# Patient Record
Sex: MALE | Race: BLACK OR AFRICAN AMERICAN | NOT HISPANIC OR LATINO | Employment: FULL TIME | URBAN - METROPOLITAN AREA
[De-identification: names, ages, dates, MRNs, and addresses within clinical notes are randomized per-mention and may not be internally consistent; named-entity substitution may affect disease eponyms.]

---

## 2017-03-20 DIAGNOSIS — Z12.11 ENCOUNTER FOR COLONOSCOPY DUE TO HISTORY OF COLONIC POLYP: Primary | ICD-10-CM

## 2017-03-20 DIAGNOSIS — Z86.010 ENCOUNTER FOR COLONOSCOPY DUE TO HISTORY OF COLONIC POLYP: Primary | ICD-10-CM

## 2017-03-20 RX ORDER — POLYETHYLENE GLYCOL 3350, SODIUM SULFATE ANHYDROUS, SODIUM BICARBONATE, SODIUM CHLORIDE, POTASSIUM CHLORIDE 236; 22.74; 6.74; 5.86; 2.97 G/4L; G/4L; G/4L; G/4L; G/4L
4 POWDER, FOR SOLUTION ORAL ONCE
Qty: 4000 ML | Refills: 0 | Status: SHIPPED | OUTPATIENT
Start: 2017-03-20 | End: 2017-03-20

## 2018-02-14 ENCOUNTER — OFFICE VISIT (OUTPATIENT)
Dept: INTERNAL MEDICINE | Facility: CLINIC | Age: 58
End: 2018-02-14
Payer: COMMERCIAL

## 2018-02-14 VITALS
OXYGEN SATURATION: 95 % | HEIGHT: 69 IN | BODY MASS INDEX: 37.58 KG/M2 | WEIGHT: 253.75 LBS | DIASTOLIC BLOOD PRESSURE: 100 MMHG | HEART RATE: 79 BPM | SYSTOLIC BLOOD PRESSURE: 160 MMHG | RESPIRATION RATE: 18 BRPM

## 2018-02-14 DIAGNOSIS — E11.42 TYPE 2 DIABETES MELLITUS WITH DIABETIC POLYNEUROPATHY, UNSPECIFIED LONG TERM INSULIN USE STATUS: Primary | ICD-10-CM

## 2018-02-14 DIAGNOSIS — Z12.5 PROSTATE CANCER SCREENING: ICD-10-CM

## 2018-02-14 DIAGNOSIS — K63.5 POLYP OF COLON, UNSPECIFIED PART OF COLON, UNSPECIFIED TYPE: ICD-10-CM

## 2018-02-14 DIAGNOSIS — R26.9 GAIT DISORDER: ICD-10-CM

## 2018-02-14 DIAGNOSIS — E78.5 HYPERLIPIDEMIA, UNSPECIFIED HYPERLIPIDEMIA TYPE: ICD-10-CM

## 2018-02-14 DIAGNOSIS — Z11.59 NEED FOR HEPATITIS C SCREENING TEST: ICD-10-CM

## 2018-02-14 DIAGNOSIS — M25.519 SHOULDER PAIN, UNSPECIFIED CHRONICITY, UNSPECIFIED LATERALITY: ICD-10-CM

## 2018-02-14 DIAGNOSIS — I10 ESSENTIAL HYPERTENSION: ICD-10-CM

## 2018-02-14 PROCEDURE — 99203 OFFICE O/P NEW LOW 30 MIN: CPT | Mod: 25,S$GLB,, | Performed by: INTERNAL MEDICINE

## 2018-02-14 PROCEDURE — 81003 URINALYSIS AUTO W/O SCOPE: CPT

## 2018-02-14 PROCEDURE — 82570 ASSAY OF URINE CREATININE: CPT

## 2018-02-14 PROCEDURE — 99999 PR PBB SHADOW E&M-EST. PATIENT-LVL IV: CPT | Mod: PBBFAC,,, | Performed by: INTERNAL MEDICINE

## 2018-02-14 PROCEDURE — 90471 IMMUNIZATION ADMIN: CPT | Mod: S$GLB,,, | Performed by: INTERNAL MEDICINE

## 2018-02-14 PROCEDURE — 90686 IIV4 VACC NO PRSV 0.5 ML IM: CPT | Mod: S$GLB,,, | Performed by: INTERNAL MEDICINE

## 2018-02-14 PROCEDURE — 81000 URINALYSIS NONAUTO W/SCOPE: CPT

## 2018-02-14 PROCEDURE — 3008F BODY MASS INDEX DOCD: CPT | Mod: S$GLB,,, | Performed by: INTERNAL MEDICINE

## 2018-02-14 PROCEDURE — 82043 UR ALBUMIN QUANTITATIVE: CPT

## 2018-02-14 RX ORDER — AMITRIPTYLINE HYDROCHLORIDE 25 MG/1
25 TABLET, FILM COATED ORAL NIGHTLY
Qty: 30 TABLET | Refills: 0 | Status: SHIPPED | OUTPATIENT
Start: 2018-02-14 | End: 2018-08-31 | Stop reason: SDUPTHER

## 2018-02-14 RX ORDER — INSULIN GLARGINE 100 [IU]/ML
16 INJECTION, SOLUTION SUBCUTANEOUS NIGHTLY
Qty: 4.8 ML | Refills: 0 | Status: SHIPPED | OUTPATIENT
Start: 2018-02-14 | End: 2018-08-31 | Stop reason: SDUPTHER

## 2018-02-14 RX ORDER — METFORMIN HYDROCHLORIDE 500 MG/1
TABLET ORAL
COMMUNITY
Start: 2017-12-08 | End: 2018-02-14 | Stop reason: SDUPTHER

## 2018-02-14 RX ORDER — PEN NEEDLE, DIABETIC 30 GX3/16"
NEEDLE, DISPOSABLE MISCELLANEOUS
COMMUNITY
Start: 2017-11-14 | End: 2020-05-27

## 2018-02-14 RX ORDER — FLUTICASONE PROPIONATE 50 MCG
SPRAY, SUSPENSION (ML) NASAL
COMMUNITY
Start: 2017-12-16 | End: 2018-02-14 | Stop reason: ALTCHOICE

## 2018-02-14 RX ORDER — INSULIN GLARGINE 100 [IU]/ML
16 INJECTION, SOLUTION SUBCUTANEOUS NIGHTLY
COMMUNITY
End: 2018-02-14 | Stop reason: SDUPTHER

## 2018-02-14 RX ORDER — LISINOPRIL 40 MG/1
40 TABLET ORAL DAILY
Qty: 30 TABLET | Refills: 0 | Status: SHIPPED | OUTPATIENT
Start: 2018-02-14 | End: 2018-08-31 | Stop reason: SDUPTHER

## 2018-02-14 RX ORDER — AMLODIPINE BESYLATE 10 MG/1
TABLET ORAL
COMMUNITY
Start: 2018-02-05 | End: 2018-08-31 | Stop reason: ALTCHOICE

## 2018-02-14 RX ORDER — METFORMIN HYDROCHLORIDE 500 MG/1
500 TABLET ORAL 2 TIMES DAILY WITH MEALS
Qty: 60 TABLET | Refills: 0 | Status: SHIPPED | OUTPATIENT
Start: 2018-02-14 | End: 2018-08-31 | Stop reason: SDUPTHER

## 2018-02-14 RX ORDER — METOPROLOL TARTRATE 100 MG/1
100 TABLET ORAL 2 TIMES DAILY
Qty: 60 TABLET | Refills: 0 | Status: SHIPPED | OUTPATIENT
Start: 2018-02-14 | End: 2018-08-31 | Stop reason: SDUPTHER

## 2018-02-14 NOTE — PROGRESS NOTES
"Patient was given vaccine information sheet for the Flu Vaccine. The area of injection was palpated using the acromion process as a landmark. This area was cleaned with alcohol. Using a 25g 1" safety needle, 0.5mL of the vaccine was placed into the Left Deltoid muscle. The injection site was dressed with a bandage. Patient experienced no complications and was discharged in stable condition. Fluzone vaccine Lot: [id number] Exp: 6/30/18    "
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
## 2018-02-14 NOTE — PATIENT INSTRUCTIONS
Your exam was stable today.  Your blood pressure was high today as a result of being off of your medications.  I refilled then today and sent to your pharmacy.  Please take them when you get home.  Will order routine fasting labs - get them done at least 1 week before your next appointment.  I will refer you to a Dietician.  For shoulder pain, keep your follow up as scheduled by [facility].  Will give you the Flu vaccine today.    Follow up in 3 months.

## 2018-02-15 LAB
BACTERIA #/AREA URNS HPF: NORMAL /HPF
BILIRUB UR QL STRIP: NEGATIVE
CAOX CRY URNS QL MICRO: NORMAL
CLARITY UR: CLEAR
COLOR UR: YELLOW
CREAT UR-MCNC: 205.5 MG/DL
GLUCOSE UR QL STRIP: NEGATIVE
HGB UR QL STRIP: NEGATIVE
HYALINE CASTS #/AREA URNS LPF: 1 /LPF
KETONES UR QL STRIP: NEGATIVE
LEUKOCYTE ESTERASE UR QL STRIP: NEGATIVE
MICROALBUMIN UR DL<=1MG/L-MCNC: 821 UG/ML
MICROALBUMIN/CREATININE RATIO: 399.5 UG/MG
MICROSCOPIC COMMENT: NORMAL
NITRITE UR QL STRIP: NEGATIVE
PH UR STRIP: 6 [PH] (ref 5–8)
PROT UR QL STRIP: ABNORMAL
RBC #/AREA URNS HPF: 2 /HPF (ref 0–4)
SP GR UR STRIP: 1.02 (ref 1–1.03)
SQUAMOUS #/AREA URNS HPF: 3 /HPF
URN SPEC COLLECT METH UR: ABNORMAL
UROBILINOGEN UR STRIP-ACNC: NEGATIVE EU/DL
WBC #/AREA URNS HPF: 5 /HPF (ref 0–5)

## 2018-02-20 DIAGNOSIS — Z13.5 DIABETIC RETINOPATHY SCREENING: ICD-10-CM

## 2018-08-31 ENCOUNTER — OFFICE VISIT (OUTPATIENT)
Dept: PRIMARY CARE CLINIC | Facility: CLINIC | Age: 58
End: 2018-08-31
Payer: COMMERCIAL

## 2018-08-31 VITALS
HEART RATE: 88 BPM | TEMPERATURE: 98 F | HEIGHT: 69 IN | DIASTOLIC BLOOD PRESSURE: 110 MMHG | WEIGHT: 255.06 LBS | OXYGEN SATURATION: 96 % | BODY MASS INDEX: 37.78 KG/M2 | SYSTOLIC BLOOD PRESSURE: 170 MMHG

## 2018-08-31 DIAGNOSIS — E78.5 HYPERLIPIDEMIA, UNSPECIFIED HYPERLIPIDEMIA TYPE: ICD-10-CM

## 2018-08-31 DIAGNOSIS — I63.9 CEREBROVASCULAR ACCIDENT (CVA), UNSPECIFIED MECHANISM: ICD-10-CM

## 2018-08-31 DIAGNOSIS — E11.9 TYPE 2 DIABETES MELLITUS WITHOUT COMPLICATION, WITHOUT LONG-TERM CURRENT USE OF INSULIN: Primary | ICD-10-CM

## 2018-08-31 DIAGNOSIS — E11.42 TYPE 2 DIABETES MELLITUS WITH DIABETIC POLYNEUROPATHY, UNSPECIFIED WHETHER LONG TERM INSULIN USE: ICD-10-CM

## 2018-08-31 DIAGNOSIS — I10 ESSENTIAL HYPERTENSION: ICD-10-CM

## 2018-08-31 PROCEDURE — 99214 OFFICE O/P EST MOD 30 MIN: CPT | Mod: S$GLB,,, | Performed by: INTERNAL MEDICINE

## 2018-08-31 PROCEDURE — 3008F BODY MASS INDEX DOCD: CPT | Mod: CPTII,S$GLB,, | Performed by: INTERNAL MEDICINE

## 2018-08-31 PROCEDURE — 3080F DIAST BP >= 90 MM HG: CPT | Mod: CPTII,S$GLB,, | Performed by: INTERNAL MEDICINE

## 2018-08-31 PROCEDURE — 99999 PR PBB SHADOW E&M-EST. PATIENT-LVL V: CPT | Mod: PBBFAC,,, | Performed by: INTERNAL MEDICINE

## 2018-08-31 PROCEDURE — 3077F SYST BP >= 140 MM HG: CPT | Mod: CPTII,S$GLB,, | Performed by: INTERNAL MEDICINE

## 2018-08-31 RX ORDER — METFORMIN HYDROCHLORIDE 500 MG/1
500 TABLET ORAL 2 TIMES DAILY WITH MEALS
Qty: 60 TABLET | Refills: 3 | Status: ON HOLD | OUTPATIENT
Start: 2018-08-31 | End: 2018-10-30

## 2018-08-31 RX ORDER — INSULIN GLARGINE 100 [IU]/ML
16 INJECTION, SOLUTION SUBCUTANEOUS NIGHTLY
Qty: 4.8 ML | Refills: 2 | Status: SHIPPED | OUTPATIENT
Start: 2018-08-31 | End: 2018-09-10

## 2018-08-31 RX ORDER — METOPROLOL TARTRATE 100 MG/1
100 TABLET ORAL 2 TIMES DAILY
Qty: 60 TABLET | Refills: 3 | Status: ON HOLD | OUTPATIENT
Start: 2018-08-31 | End: 2018-10-30

## 2018-08-31 RX ORDER — LISINOPRIL 40 MG/1
40 TABLET ORAL DAILY
Qty: 30 TABLET | Refills: 3 | Status: ON HOLD | OUTPATIENT
Start: 2018-08-31 | End: 2018-10-30

## 2018-08-31 RX ORDER — NAPROXEN SODIUM 220 MG/1
81 TABLET, FILM COATED ORAL DAILY
Qty: 30 TABLET | Refills: 0 | Status: ON HOLD
Start: 2018-08-31 | End: 2018-10-29

## 2018-08-31 RX ORDER — FELODIPINE 10 MG/1
10 TABLET, EXTENDED RELEASE ORAL DAILY
Qty: 30 TABLET | Refills: 3 | Status: ON HOLD | OUTPATIENT
Start: 2018-08-31 | End: 2018-10-30 | Stop reason: HOSPADM

## 2018-08-31 RX ORDER — AMITRIPTYLINE HYDROCHLORIDE 25 MG/1
25 TABLET, FILM COATED ORAL NIGHTLY
Qty: 30 TABLET | Refills: 2 | Status: ON HOLD | OUTPATIENT
Start: 2018-08-31 | End: 2018-10-30 | Stop reason: HOSPADM

## 2018-08-31 NOTE — PATIENT INSTRUCTIONS
I refilled your Blood Pressure and Diabetic Medications - please restart today.  I will order routine fasting labs today - at least 6-8 hours of fasting.  Get these done soon.  I would like you to return in 1 week for a blood pressure check.  I will schedule you to see Podiatry, the Eye clinic, and Diabetic Education Nurse.  Return in 4 weeks - sooner if needed.  Please come at least 15-20 minutes before your scheduled appointment time.
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
## 2018-09-10 ENCOUNTER — TELEPHONE (OUTPATIENT)
Dept: PRIMARY CARE CLINIC | Facility: CLINIC | Age: 58
End: 2018-09-10

## 2018-09-10 RX ORDER — INSULIN GLARGINE 100 [IU]/ML
16 INJECTION, SOLUTION SUBCUTANEOUS NIGHTLY
Qty: 15 ML | Refills: 2 | Status: SHIPPED | OUTPATIENT
Start: 2018-09-10 | End: 2019-05-24 | Stop reason: SDUPTHER

## 2018-09-10 RX ORDER — INSULIN GLARGINE 100 [IU]/ML
16 INJECTION, SOLUTION SUBCUTANEOUS NIGHTLY
Qty: 15 ML | Refills: 2 | Status: SHIPPED | OUTPATIENT
Start: 2018-09-10 | End: 2018-09-10 | Stop reason: SDUPTHER

## 2018-09-10 NOTE — TELEPHONE ENCOUNTER
Fax came through from [facility] pharmacy stating that the patient was on Lantus pens and prefers them

## 2018-10-03 ENCOUNTER — TELEPHONE (OUTPATIENT)
Dept: ADMINISTRATIVE | Facility: HOSPITAL | Age: 58
End: 2018-10-03

## 2018-10-03 NOTE — TELEPHONE ENCOUNTER
The patient was phoned about his eye exam and there was no answer. I left a voice message.     Carolann CHAPMAN LPN  Clinical Care Coordinator  Internal Medicine  [facility]

## 2018-10-28 ENCOUNTER — HOSPITAL ENCOUNTER (INPATIENT)
Facility: OTHER | Age: 58
LOS: 2 days | Discharge: HOME | DRG: 066 | End: 2018-10-30
Attending: EMERGENCY MEDICINE | Admitting: EMERGENCY MEDICINE
Payer: COMMERCIAL

## 2018-10-28 DIAGNOSIS — E87.6 HYPOKALEMIA: ICD-10-CM

## 2018-10-28 DIAGNOSIS — I63.9 CVA (CEREBRAL VASCULAR ACCIDENT): ICD-10-CM

## 2018-10-28 DIAGNOSIS — G45.9 TIA (TRANSIENT ISCHEMIC ATTACK): Chronic | ICD-10-CM

## 2018-10-28 DIAGNOSIS — R47.81 SLURRED SPEECH: ICD-10-CM

## 2018-10-28 DIAGNOSIS — I63.9 STROKE: Primary | ICD-10-CM

## 2018-10-28 LAB
ALBUMIN SERPL BCP-MCNC: 3.7 G/DL
ALP SERPL-CCNC: 64 U/L
ALT SERPL W/O P-5'-P-CCNC: 21 U/L
ANION GAP SERPL CALC-SCNC: 13 MMOL/L
AST SERPL-CCNC: 24 U/L
BASOPHILS # BLD AUTO: 0.05 K/UL
BASOPHILS NFR BLD: 0.9 %
BILIRUB SERPL-MCNC: 0.5 MG/DL
BUN SERPL-MCNC: 14 MG/DL
CALCIUM SERPL-MCNC: 9.3 MG/DL
CHLORIDE SERPL-SCNC: 106 MMOL/L
CO2 SERPL-SCNC: 23 MMOL/L
CREAT SERPL-MCNC: 1.2 MG/DL (ref 0.5–1.4)
CREAT SERPL-MCNC: 1.3 MG/DL
DIFFERENTIAL METHOD: ABNORMAL
EOSINOPHIL # BLD AUTO: 0.3 K/UL
EOSINOPHIL NFR BLD: 5.1 %
ERYTHROCYTE [DISTWIDTH] IN BLOOD BY AUTOMATED COUNT: 15.6 %
EST. GFR  (AFRICAN AMERICAN): >60 ML/MIN/1.73 M^2
EST. GFR  (NON AFRICAN AMERICAN): 59 ML/MIN/1.73 M^2
GLUCOSE SERPL-MCNC: 139 MG/DL
HCT VFR BLD AUTO: 39.7 %
HGB BLD-MCNC: 13.5 G/DL
INR PPP: 0.9
LYMPHOCYTES # BLD AUTO: 1.9 K/UL
LYMPHOCYTES NFR BLD: 34.7 %
MCH RBC QN AUTO: 27.4 PG
MCHC RBC AUTO-ENTMCNC: 34 G/DL
MCV RBC AUTO: 81 FL
MONOCYTES # BLD AUTO: 0.4 K/UL
MONOCYTES NFR BLD: 7.3 %
NEUTROPHILS # BLD AUTO: 2.8 K/UL
NEUTROPHILS NFR BLD: 51.8 %
PLATELET # BLD AUTO: 238 K/UL
PMV BLD AUTO: 10.6 FL
POC PTINR: 0.9 (ref 0.9–1.2)
POC PTWBT: 11.3 SEC (ref 9.7–14.3)
POCT GLUCOSE: 134 MG/DL (ref 70–110)
POTASSIUM SERPL-SCNC: 3.1 MMOL/L
PROT SERPL-MCNC: 7.3 G/DL
PROTHROMBIN TIME: 10.4 SEC
RBC # BLD AUTO: 4.93 M/UL
SAMPLE: NORMAL
SAMPLE: NORMAL
SODIUM SERPL-SCNC: 142 MMOL/L
TSH SERPL DL<=0.005 MIU/L-ACNC: 1.62 UIU/ML
WBC # BLD AUTO: 5.33 K/UL

## 2018-10-28 PROCEDURE — 25000003 PHARM REV CODE 250: Performed by: EMERGENCY MEDICINE

## 2018-10-28 PROCEDURE — 99900035 HC TECH TIME PER 15 MIN (STAT)

## 2018-10-28 PROCEDURE — 99285 EMERGENCY DEPT VISIT HI MDM: CPT | Mod: 25

## 2018-10-28 PROCEDURE — 85025 COMPLETE CBC W/AUTO DIFF WBC: CPT

## 2018-10-28 PROCEDURE — 12000002 HC ACUTE/MED SURGE SEMI-PRIVATE ROOM

## 2018-10-28 PROCEDURE — 82565 ASSAY OF CREATININE: CPT

## 2018-10-28 PROCEDURE — 93005 ELECTROCARDIOGRAM TRACING: CPT

## 2018-10-28 PROCEDURE — 85610 PROTHROMBIN TIME: CPT

## 2018-10-28 PROCEDURE — 93010 ELECTROCARDIOGRAM REPORT: CPT | Mod: ,,, | Performed by: INTERNAL MEDICINE

## 2018-10-28 PROCEDURE — 82962 GLUCOSE BLOOD TEST: CPT

## 2018-10-28 PROCEDURE — 84443 ASSAY THYROID STIM HORMONE: CPT

## 2018-10-28 PROCEDURE — 80053 COMPREHEN METABOLIC PANEL: CPT

## 2018-10-28 PROCEDURE — 80061 LIPID PANEL: CPT

## 2018-10-28 RX ORDER — POTASSIUM CHLORIDE 20 MEQ/1
40 TABLET, EXTENDED RELEASE ORAL
Status: COMPLETED | OUTPATIENT
Start: 2018-10-28 | End: 2018-10-28

## 2018-10-28 RX ORDER — ASPIRIN 325 MG
325 TABLET ORAL DAILY
Status: ON HOLD | COMMUNITY
End: 2018-10-30 | Stop reason: HOSPADM

## 2018-10-28 RX ORDER — LANOLIN ALCOHOL/MO/W.PET/CERES
400 CREAM (GRAM) TOPICAL
Status: COMPLETED | OUTPATIENT
Start: 2018-10-28 | End: 2018-10-28

## 2018-10-28 RX ORDER — METOPROLOL TARTRATE 50 MG/1
100 TABLET ORAL 2 TIMES DAILY
Status: DISCONTINUED | OUTPATIENT
Start: 2018-10-29 | End: 2018-10-30 | Stop reason: HOSPADM

## 2018-10-28 RX ORDER — ONDANSETRON 2 MG/ML
4 INJECTION INTRAMUSCULAR; INTRAVENOUS EVERY 8 HOURS PRN
Status: DISCONTINUED | OUTPATIENT
Start: 2018-10-28 | End: 2018-10-30 | Stop reason: HOSPADM

## 2018-10-28 RX ORDER — ASPIRIN 325 MG
325 TABLET ORAL DAILY
Status: DISCONTINUED | OUTPATIENT
Start: 2018-10-29 | End: 2018-10-30

## 2018-10-28 RX ADMIN — POTASSIUM CHLORIDE 40 MEQ: 1500 TABLET, EXTENDED RELEASE ORAL at 10:10

## 2018-10-28 RX ADMIN — Medication 400 MG: at 10:10

## 2018-10-28 NOTE — LETTER
October 30, 2018         [address]  [facility] [address]  Phone: [phone]  Fax: [phone]       Patient: Steven Shields   YOB: 1960     Date of Admit: 10/28/2018  Date of Discharge: 10/30/2018    To Whom It May Concern:    Anthony Shields  was at [facility] on 10/30/2018. He may return to work/school on 10/31/2018 with no restrictions. If you have any questions or concerns, or if I can be of further assistance, please do not hesitate to contact me.    Sincerely,          Chris Gr MD

## 2018-10-29 PROBLEM — G45.9 TIA (TRANSIENT ISCHEMIC ATTACK): Chronic | Status: ACTIVE | Noted: 2018-10-28

## 2018-10-29 LAB
AORTIC VALVE REGURGITATION: ABNORMAL
CHOLEST SERPL-MCNC: 218 MG/DL
CHOLEST/HDLC SERPL: 6.4 {RATIO}
DIASTOLIC DYSFUNCTION: YES
ESTIMATED PA SYSTOLIC PRESSURE: 28.81
GLOBAL PERICARDIAL EFFUSION: ABNORMAL
HDLC SERPL-MCNC: 34 MG/DL
HDLC SERPL: 15.6 %
LDLC SERPL CALC-MCNC: 143.6 MG/DL
MITRAL VALVE REGURGITATION: ABNORMAL
NONHDLC SERPL-MCNC: 184 MG/DL
POCT GLUCOSE: 105 MG/DL (ref 70–110)
POCT GLUCOSE: 116 MG/DL (ref 70–110)
POCT GLUCOSE: 126 MG/DL (ref 70–110)
POCT GLUCOSE: 131 MG/DL (ref 70–110)
POCT GLUCOSE: 141 MG/DL (ref 70–110)
POCT GLUCOSE: 98 MG/DL (ref 70–110)
RETIRED EF AND QEF - SEE NOTES: 70 (ref 55–65)
TRICUSPID VALVE REGURGITATION: ABNORMAL
TRIGL SERPL-MCNC: 202 MG/DL

## 2018-10-29 PROCEDURE — 99223 1ST HOSP IP/OBS HIGH 75: CPT | Mod: ,,, | Performed by: HOSPITALIST

## 2018-10-29 PROCEDURE — 25000003 PHARM REV CODE 250: Performed by: NURSE PRACTITIONER

## 2018-10-29 PROCEDURE — 92610 EVALUATE SWALLOWING FUNCTION: CPT

## 2018-10-29 PROCEDURE — A9585 GADOBUTROL INJECTION: HCPCS | Performed by: HOSPITALIST

## 2018-10-29 PROCEDURE — 97165 OT EVAL LOW COMPLEX 30 MIN: CPT

## 2018-10-29 PROCEDURE — G8978 MOBILITY CURRENT STATUS: HCPCS | Mod: CK

## 2018-10-29 PROCEDURE — 99223 1ST HOSP IP/OBS HIGH 75: CPT | Mod: ,,, | Performed by: PSYCHIATRY & NEUROLOGY

## 2018-10-29 PROCEDURE — 93306 TTE W/DOPPLER COMPLETE: CPT

## 2018-10-29 PROCEDURE — 97161 PT EVAL LOW COMPLEX 20 MIN: CPT

## 2018-10-29 PROCEDURE — 97535 SELF CARE MNGMENT TRAINING: CPT

## 2018-10-29 PROCEDURE — 99900035 HC TECH TIME PER 15 MIN (STAT)

## 2018-10-29 PROCEDURE — G8979 MOBILITY GOAL STATUS: HCPCS | Mod: CI

## 2018-10-29 PROCEDURE — 94761 N-INVAS EAR/PLS OXIMETRY MLT: CPT

## 2018-10-29 PROCEDURE — 25500020 PHARM REV CODE 255: Performed by: HOSPITALIST

## 2018-10-29 PROCEDURE — 97116 GAIT TRAINING THERAPY: CPT

## 2018-10-29 PROCEDURE — 99223 1ST HOSP IP/OBS HIGH 75: CPT | Mod: ,,, | Performed by: NURSE PRACTITIONER

## 2018-10-29 PROCEDURE — 11000001 HC ACUTE MED/SURG PRIVATE ROOM

## 2018-10-29 PROCEDURE — 25000003 PHARM REV CODE 250: Performed by: HOSPITALIST

## 2018-10-29 RX ORDER — NIFEDIPINE 30 MG/1
30 TABLET, EXTENDED RELEASE ORAL DAILY
Status: DISCONTINUED | OUTPATIENT
Start: 2018-10-29 | End: 2018-10-30 | Stop reason: HOSPADM

## 2018-10-29 RX ORDER — LISINOPRIL 20 MG/1
40 TABLET ORAL DAILY
Status: DISCONTINUED | OUTPATIENT
Start: 2018-10-29 | End: 2018-10-30 | Stop reason: HOSPADM

## 2018-10-29 RX ORDER — GADOBUTROL 604.72 MG/ML
10 INJECTION INTRAVENOUS
Status: COMPLETED | OUTPATIENT
Start: 2018-10-29 | End: 2018-10-29

## 2018-10-29 RX ADMIN — ASPIRIN 325 MG ORAL TABLET 325 MG: 325 PILL ORAL at 09:10

## 2018-10-29 RX ADMIN — GADOBUTROL 10 ML: 604.72 INJECTION INTRAVENOUS at 07:10

## 2018-10-29 RX ADMIN — LISINOPRIL 40 MG: 20 TABLET ORAL at 02:10

## 2018-10-29 RX ADMIN — NIFEDIPINE 30 MG: 30 TABLET, FILM COATED, EXTENDED RELEASE ORAL at 05:10

## 2018-10-29 RX ADMIN — METOPROLOL TARTRATE 100 MG: 50 TABLET ORAL at 08:10

## 2018-10-29 RX ADMIN — METOPROLOL TARTRATE 100 MG: 50 TABLET ORAL at 12:10

## 2018-10-29 RX ADMIN — METOPROLOL TARTRATE 100 MG: 50 TABLET ORAL at 09:10

## 2018-10-29 NOTE — PT/OT/SLP EVAL
Speech Language Pathology Evaluation  Bedside Swallow    Patient Name:  Steven Shields   MRN:  [id number]  Admitting Diagnosis: TIA (transient ischemic attack)    Recommendations:                 General Recommendations:    1. Speech therapy to follow up 3-5x per week for ongoing assessment and remediation of motor speech deficits and to monitor diet tolerance     Diet recommendations:  Regular, Thin     Aspiration Precautions: 1 bite/sip at a time, Avoid talking while eating, Feed only when awake/alert, Frequent oral care, HOB to 90 degrees, Remain upright 30 minutes post meal, Small bites/sips and Standard aspiration precautions     General Precautions: Standard, aspiration     History:     Per NP HPI: The patient is a 58-year-old male presents to the ED with complaint of slurred speech.  He states that he 1st noticed it on Friday, 2 days ago.  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  He is currently wearing a towel around his neck, to catch the drool.  He has also noted some general weakness over this time.  The patient has a history of hypertension, dyslipidemia, diabetes and prior CVA.  He denies any current numbness or tingling or focal weakness    MRI Results: 1. Findings compatible with recent infarctions in the distribution of the pontine perforators, involving the belly of the fabiola, without acute hemorrhages or significant mass effects.  2. Supratentorial cerebral white matter changes compatible with chronic microvascular ischemia.      Past Medical History:   Diagnosis Date    Diabetes mellitus type II     Hyperlipidemia     Hypertension     Stroke     Nov 2011, denies deficits        Past Surgical History:   Procedure Laterality Date    ANKLE FRACTURE SURGERY      HAND SURGERY Left     NERVE GRAFT         Subjective     Spoke with RN prior to session. Reports pt was able to consume breakfast without overt s/s of  "aspiration or airway threat prior to NPO status. Pt awake, alert, agreeable to session. Reports hx of stroke in 2011. Reports that he previously had speech therapy; however, was unable to elaborate on previous deficits. Denies any hx or present difficulty swallowing. Reports that the speech slurring "comes and goes."     Pain/Comfort:  · Pain Rating 1: 0/10    Objective:     Cognitive Communication: Pt awake, alert, pleasant. Agreeable to session. Oriented to name, , hospital, date, month, year, and reason for hospitalization with 100% acc. Able to focus and sustain attention in quiet environment for entire 18 minute session without redirection. Pt is able to answer simple to complex yes/no questions with 100% acc. Able to follow multistep commands with 100% acc. Able to participate in several turns of a conversation regarding PMH and current status. Verbal expression is fluent; however, motor speech deficits evident. Pt is able to express wants and needs in fluent and organized sentences.     Motor Speech: Speech is % intelligible at the conversational level; however, dysarthria present c/b imprecise articulation, dcr rate of speech, dcr AMRs, and slurring. No evidence of dcr breath support during speech. Increased articulatory imprecision noted with 3+ syllable words. Pt states that the "slurring comes and goes." Also notes that he has significant difficulty with words that require produce of /K/. Labial seal is mildly dcr on L side. Pt notes increased drooling with initial admission to hospital; however, no drooling was observed during session. SLP reviewed strategies to increase intelligibility and recommendations to continue outpatient speech therapy to address dysarthria. Pt in agreement. Will continue to assess motor speech while in acute care.     Oral Musculature Evaluation  · Oral Musculature: WFL  · Dentition: present and adequate  · Secretion Management: other (see comments)(Drooling reported, " but none observed during evaluation)  · Mucosal Quality: adequate  · Mandibular Strength and Mobility: WFL  · Oral Labial Strength and Mobility: impaired retraction, functional pursing, functional seal, functional coordination  · Lingual Strength and Mobility: WFL, functional strength, functional protrusion, functional anterior elevation, functional lateral movement  · Buccal Strength and Mobility: WFL  · Voice Prior to PO Intake: Clear, adequate volume  · Oral Musculature Comments: Face is symmetrical at rest. Dcr retraction observed bilaterally during smile. Pt notes left sided weakness; however, no asymmetry observed. Tongue is able to protrude at midline, elevate, lateralize, and retract WFL. Mildly dcr labial strength on left with mild dcr seal; however, WFL. Vocal quality is clear with adequate volume. Speech is % intelligible at the conversationl level with instances of imprecise articulation and slurred speech. Pt is able to convey wants and needs in fluent and organized sentences.    Bedside Swallow Eval:   Consistencies Assessed:  · Thin liquids 3-5 mls via spoon, small cup rim sips, successive cup rim sips  · Puree 1 tbs pudding, able to consume 4 oz  · Solids 1/4 armin cracker      Oral Phase:   · Lip seal mildly dcr on L side; however, WFL.   · Ability to form a cohesive bolus and a-p transport appear WFL.  · Mild oral residuals after the swallow with solids. Able to clear with second swallow.     Pharyngeal Phase:   · Trigger of swallow appears timely and WFL.  · No overt s/s of aspiration or airway threat with thin liquids, purees, or solids: no coughing, throat clearing, vocal quality change.   · No overt evidence of pharyngeal residuals.       Assessment:     Steven Shields is a 58 y.o. male with an SLP diagnosis of Dysarthria.  He presents with motor speech deficits c/b imprecise articulation, dcr conversational intelligibility, dcr rate of speech, and slurring. It is recommended that pt  begin diet of regular solids and thin liquids, as he was able to consume all consistencies without overt s/s of aspiration or airway threat during the bedside evaluation. Will continue to assess motor speech and monitor diet tolerance.     Goals:   Multidisciplinary Problems     SLP Goals        Problem: SLP Goal    Goal Priority Disciplines Outcome   SLP Goal     SLP Ongoing (interventions implemented as appropriate)   Description:  1. Pt will be able to consume a regular diet with thin liquids without overt s/s of aspiration or airway threat given no assistance.   2. Pt will be able to increase intelligibility to 100% at the conversational level given min cues to dcr rate of speech and  increase articulatory precision.   3. Ongoing evaluation and remediation of motor speech deficits                     Plan:     · Patient to be seen:  3 x/week, 5 x/week   · Plan of Care expires:  11/09/18  · Plan of Care reviewed with:  patient, other (see comments)(RN)   · SLP Follow-Up:  Yes       Discharge recommendations:  outpatient speech therapy       Time Tracking:     SLP Treatment Date:   10/29/18  Speech Start Time:  1142  Speech Stop Time:  1200     Speech Total Time (min):  18 min    Billable Minutes: Eval Swallow and Oral Function 18 minutes    Zeferino Rudolph CCC-SLP  10/29/2018

## 2018-10-29 NOTE — PLAN OF CARE
Problem: Patient Care Overview  Goal: Plan of Care Review  Outcome: Ongoing (interventions implemented as appropriate)  Pt in no distress on RA, will continue to monitor.

## 2018-10-29 NOTE — SUBJECTIVE & OBJECTIVE
"Past Medical History:   Diagnosis Date    Diabetes mellitus type II     Hyperlipidemia     Hypertension     Stroke     2011, denies deficits        Past Surgical History:   Procedure Laterality Date    ANKLE FRACTURE SURGERY      HAND SURGERY Left     NERVE GRAFT         Review of patient's allergies indicates:   Allergen Reactions    No known drug allergies        No current facility-administered medications on file prior to encounter.      Current Outpatient Medications on File Prior to Encounter   Medication Sig    amitriptyline (ELAVIL) 25 MG tablet Take 1 tablet (25 mg total) by mouth every evening.    aspirin 325 MG tablet Take 325 mg by mouth once daily.    felodipine (PLENDIL) 10 MG 24 hr tablet Take 1 tablet (10 mg total) by mouth once daily.    insulin glargine (LANTUS SOLOSTAR) 100 unit/mL (3 mL) InPn pen Inject 16 Units into the skin every evening.    lisinopril (PRINIVIL,ZESTRIL) 40 MG tablet Take 1 tablet (40 mg total) by mouth once daily.    metoprolol tartrate (LOPRESSOR) 100 MG tablet Take 1 tablet (100 mg total) by mouth 2 (two) times daily.    aspirin 81 MG Chew Take 1 tablet (81 mg total) by mouth once daily.    metFORMIN (GLUCOPHAGE) 500 MG tablet Take 1 tablet (500 mg total) by mouth 2 (two) times daily with meals.    pen needle, diabetic 31 gauge x 5/16" Ndle      Family History     Problem Relation (Age of Onset)    Heart disease Mother, Father        Tobacco Use    Smoking status: Former Smoker     Packs/day: 1.00     Years: 15.00     Pack years: 15.00     Last attempt to quit: 1990     Years since quittin.8    Smokeless tobacco: Never Used   Substance and Sexual Activity    Alcohol use: No     Frequency: Never    Drug use: No    Sexual activity: Yes     Partners: Female     Review of Systems   Constitutional: Positive for fatigue. Negative for activity change, appetite change and fever.   HENT: Negative for congestion, ear pain and postnasal drip.    Eyes: " Negative for discharge.   Respiratory: Negative for apnea, shortness of breath and wheezing.    Cardiovascular: Negative for chest pain and leg swelling.   Gastrointestinal: Negative for abdominal distention, abdominal pain, nausea and vomiting.   Endocrine: Negative for polydipsia, polyphagia and polyuria.   Genitourinary: Negative for difficulty urinating, flank pain, frequency, hematuria and urgency.   Musculoskeletal: Positive for arthralgias and myalgias. Negative for joint swelling.   Skin: Negative for pallor and rash.   Allergic/Immunologic: Negative for environmental allergies and food allergies.   Neurological: Positive for speech difficulty and weakness. Negative for dizziness, light-headedness and headaches.   Hematological: Does not bruise/bleed easily.   Psychiatric/Behavioral: Negative for agitation.     Objective:     Vital Signs (Most Recent):  Temp: 97.5 °F (36.4 °C) (10/29/18 0422)  Pulse: 64 (10/29/18 0422)  Resp: 14 (10/29/18 0422)  BP: (!) 165/96 (10/29/18 0422)  SpO2: (!) 93 % (10/29/18 0422) Vital Signs (24h Range):  Temp:  [97.5 °F (36.4 °C)-98.1 °F (36.7 °C)] 97.5 °F (36.4 °C)  Pulse:  [64-86] 64  Resp:  [14-25] 14  SpO2:  [92 %-96 %] 93 %  BP: (135-170)/(72-96) 165/96     Weight: 117.3 kg (258 lb 9.6 oz)  Body mass index is 38.19 kg/m².    Physical Exam   Constitutional: He is oriented to person, place, and time. He appears well-developed and well-nourished.   HENT:   Head: Normocephalic.   Eyes: Conjunctivae are normal.   Neck: Normal range of motion. Neck supple.   Cardiovascular: Normal rate, normal heart sounds and intact distal pulses. An irregular rhythm present.   Pulses:       Radial pulses are 2+ on the right side, and 2+ on the left side.   Pulmonary/Chest: Effort normal. He has decreased breath sounds in the right lower field and the left lower field.   Abdominal: Soft. Bowel sounds are normal. He exhibits no distension. There is no tenderness.   Musculoskeletal: Normal range of  motion.   Neurological: He is alert and oriented to person, place, and time.   Skin: Skin is warm and dry.   Psychiatric: He has a normal mood and affect. His behavior is normal.           Significant Labs:   CBC:   Recent Labs   Lab 10/28/18  2031   WBC 5.33   HGB 13.5*   HCT 39.7*        CMP:   Recent Labs   Lab 10/28/18  2031      K 3.1*      CO2 23   *   BUN 14   CREATININE 1.3   CALCIUM 9.3   PROT 7.3   ALBUMIN 3.7   BILITOT 0.5   ALKPHOS 64   AST 24   ALT 21   ANIONGAP 13   EGFRNONAA 59*       Significant Imaging: I have reviewed all pertinent imaging results/findings within the past 24 hours.

## 2018-10-29 NOTE — PLAN OF CARE
Problem: SLP Goal  Goal: SLP Goal  1. Pt will be able to consume a regular diet with thin liquids without overt s/s of aspiration or airway threat given no assistance.   2. Pt will be able to increase intelligibility to 100% at the conversational level given min cues to dcr rate of speech and  increase articulatory precision.   3. Ongoing evaluation and remediation of motor speech deficits   Outcome: Ongoing (interventions implemented as appropriate)  Bedside swallow evaluation completed this date.     Comments: Speech therapy to follow up 3-5x per week for ongoing assessment and remediation of motor speech deficits and to monitor diet tolerance.

## 2018-10-29 NOTE — PLAN OF CARE
Problem: Occupational Therapy Goal  Goal: Occupational Therapy Goal  Goals to be met by: 11/28/2018     Patient will increase functional independence with ADLs by performing:    UE Dressing with Set-up Assistance.  LE Dressing with Supervision.  Grooming while standing at sink with Supervision.  Toileting from toilet with Supervision for hygiene and clothing management.   Step transfer with Supervision  Toilet transfer to toilet with Supervision.  Functional mobility at house-hold level with no AD and SPV.     Outcome: Ongoing (interventions implemented as appropriate)  OT eval completed and goals set.     Yu Boucher, OTR/L  10/29/2018

## 2018-10-29 NOTE — ED PROVIDER NOTES
Encounter Date: 10/28/2018       History     Chief Complaint   Patient presents with    Aphasia     Pt reports slurred speech, worsening since Friday, reports left side weakness w/ symptoms     Time of patient contact:    58-year-old male presents to the ED with complaint of slurred speech.  He states that he 1st noticed it on Friday, 2 days ago.  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  He is currently wearing a towel around his neck, to catch the drool.  He has also noted some general weakness over this time.  The patient has a history of hypertension, dyslipidemia, diabetes and prior CVA.  He denies any current numbness or tingling or focal weakness.          Review of patient's allergies indicates:   Allergen Reactions    No known drug allergies      Past Medical History:   Diagnosis Date    Diabetes mellitus type II     Hyperlipidemia     Hypertension     Stroke     2011, denies deficits      Past Surgical History:   Procedure Laterality Date    ANKLE FRACTURE SURGERY      HAND SURGERY Left     NERVE GRAFT       Family History   Problem Relation Age of Onset    Heart disease Mother     Heart disease Father      Social History     Tobacco Use    Smoking status: Former Smoker     Packs/day: 1.00     Years: 15.00     Pack years: 15.00     Last attempt to quit: 1990     Years since quittin.8    Smokeless tobacco: Never Used   Substance Use Topics    Alcohol use: No     Frequency: Never    Drug use: No     Review of Systems   Constitutional: Negative for chills and fever.   HENT: Positive for drooling. Negative for congestion and sore throat.    Eyes: Negative for pain and visual disturbance.   Respiratory: Negative for shortness of breath and wheezing.    Cardiovascular: Negative for chest pain and palpitations.   Gastrointestinal: Negative for nausea and vomiting.   Genitourinary: Negative for  dysuria and urgency.   Musculoskeletal: Negative for back pain.   Skin: Negative for rash.   Neurological: Positive for speech difficulty. Negative for weakness and numbness.   Hematological: Does not bruise/bleed easily.   Psychiatric/Behavioral: Negative for behavioral problems and confusion.       Physical Exam     Initial Vitals [10/28/18 2016]   BP Pulse Resp Temp SpO2   135/76 86 18 98 °F (36.7 °C) 95 %      MAP       --         Physical Exam    Constitutional: He appears well-developed and well-nourished. No distress.   HENT:   Head: Normocephalic and atraumatic.   Mouth/Throat: Oropharynx is clear and moist.   Eyes: Conjunctivae and EOM are normal. Pupils are equal, round, and reactive to light.   Neck: Normal range of motion. Neck supple.   Cardiovascular: Normal rate, regular rhythm and normal heart sounds.   No murmur heard.  Abdominal: Soft. Bowel sounds are normal. There is no tenderness.   Musculoskeletal: Normal range of motion.   Neurological: He is alert and oriented to person, place, and time. He has normal strength. No cranial nerve deficit or sensory deficit.   Slurred speech without obvious facial asymmetry. Normal eyebrow raise.  Smile appears as a grimace, but symmetric.   Skin: Skin is warm and dry. No rash noted.   Psychiatric: He has a normal mood and affect. His behavior is normal.         ED Course   Procedures  Labs Reviewed   CBC W/ AUTO DIFFERENTIAL - Abnormal; Notable for the following components:       Result Value    Hemoglobin 13.5 (*)     Hematocrit 39.7 (*)     MCV 81 (*)     RDW 15.6 (*)     All other components within normal limits   COMPREHENSIVE METABOLIC PANEL - Abnormal; Notable for the following components:    Potassium 3.1 (*)     Glucose 139 (*)     eGFR if non  59 (*)     All other components within normal limits   POCT GLUCOSE - Abnormal; Notable for the following components:    POCT Glucose 134 (*)     All other components within normal limits    PROTIME-INR   TSH   LIPID PANEL   POCT GLUCOSE   ISTAT PROCEDURE   ISTAT CREATININE   POCT GLUCOSE MONITORING CONTINUOUS     EKG Readings: (Independently Interpreted)   Initial Reading: No STEMI. Rhythm: Normal Sinus Rhythm. Heart Rate: 79.   Lateral T-wave inversions are present which are also present on previous EKG from 11/01/2011.       Imaging Results          X-Ray Chest AP Portable (Final result)  Result time 10/28/18 21:05:00    Final result by Lanre Tierney MD (10/28/18 21:05:00)                 Impression:      Mild cardiomegaly with no acute cardiopulmonary process identified.      Electronically signed by: Lanre Tierney MD  Date:    10/28/2018  Time:    21:05             Narrative:    EXAMINATION:  XR CHEST AP PORTABLE    CLINICAL HISTORY:  Stroke;    TECHNIQUE:  Single frontal view of the chest was performed.    COMPARISON:  November 2011.    FINDINGS:  Cardiac silhouette is mildly enlarged but stable in size.  Lungs are symmetrically expanded.  No evidence of focal consolidative process, pneumothorax, or significant effusion.  No acute osseous abnormality identified.                               CT Head Without Contrast (Final result)  Result time 10/28/18 20:52:42    Final result by Delfino Lozano MD (10/28/18 20:52:42)                 Impression:      No acute intracranial abnormalities    Mild patchy decreased supratentorial white matter attenuation most likely related to chronic nonspecific small vessel disease, similar to prior.      Electronically signed by: Delfino Lozano MD  Date:    10/28/2018  Time:    20:52             Narrative:    EXAMINATION:  CT HEAD WITHOUT CONTRAST    CLINICAL HISTORY:  Speech changes (or aphasia), new or progressive;    TECHNIQUE:  Low dose axial images were obtained through the head.  Coronal and sagittal reformations were also performed. Contrast was not administered.    COMPARISON:  CTA head November 1, 2011.    FINDINGS:  Mild patchy decreased  supratentorial white matter attenuation most likely related to chronic nonspecific small vessel disease, similar to prior.  There is no evidence of acute infarct, hemorrhage, or mass.  The ventricular system is normal in size.  No mass-effect or midline shift.  There are no abnormal extra-axial fluid collections.  The paranasal sinuses and mastoid air cells are clear.  The calvarium appears intact.  .                                 Medical Decision Making:   ED Management:  9:58 PM - discussed the case with mayo Coyne for hospital service.  He will admit the patient.    Emergent evaluation of 58-year-old male who presented with complaint of slurred speech.  At triage he complained of slurred speech and nursing staff noted some left upper extremity weakness, code stroke was called.  Vital signs are benign, afebrile.  Shortly after arriving at CT scan there were several brief power outage is which delayed the CT scan as they required full restart of the scanner.  During this time I examined the patient in the CT room.  I did not note any extremity weakness.  After my exam I called the transfer center and canceled the code stroke, change to a simple Neurology consult.  I discussed the case with Dr. Hoyt.  CT shows small-vessel disease but no large infarct.  Labs show hypokalemia which will be repleted orally, but no other serious finding.  He is admitted in stable condition for further care.                   ED Course as of Oct 28 2201   Sun Oct 28, 2018   2040 I Discussed the case with Dr. Hoyt, Stroke Neurology.  [AK]   [phone] [facility]  [AK]      ED Course User Index  [AK] Tammy Hassan MD     Clinical Impression:     1. Slurred speech    2. Stroke                                     Tammy Hassan MD  10/28/18 2203       Tammy Hassan MD  10/29/18 0150

## 2018-10-29 NOTE — ASSESSMENT & PLAN NOTE
CT- No acute intracranial abnormalities.  Mild patchy decreased supratentorial white matter attenuation most likely related to chronic nonspecific small vessel disease, similar to prior.    MRI/MRA Brain Neck  Echo  Venous Doppler  Lipid panel  Consult Neuro

## 2018-10-29 NOTE — HPI
The patient is a 58-year-old male presents to the ED with complaint of slurred speech.  He states that he 1st noticed it on Friday, 2 days ago.  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  He is currently wearing a towel around his neck, to catch the drool.  He has also noted some general weakness over this time.  The patient has a history of hypertension, dyslipidemia, diabetes and prior CVA.  He denies any current numbness or tingling or focal weakness.

## 2018-10-29 NOTE — ED TRIAGE NOTES
Pt presents with c/o slurred speech onset Friday. Pt also reports new onset drooling on Friday. Pt reports stroke in 2011, denies deficits from that stroke. Pt denies extremity weakness, denies trouble walking. Pt reports compliant with medications for DM, HTN. Pt reports taking AM dose of medications but not PM. Pt speaks fluently, only speech hesitated was when trying to recall medications. Pt AAO X 4. Pt is well appearing, pt in NAD. CT preformed, IV placed, and blood drawn prior to room placement. Will continue to monitor

## 2018-10-29 NOTE — H&P
[facility] Hospital Medicine  History & Physical    Patient Name: Steven Shields  MRN: [id number]  Admission Date: 10/28/2018  Attending Physician: Devin Mi MD   Primary Care Provider: Julian Carson MD         Patient information was obtained from patient, past medical records and ER records.     Subjective:     Principal Problem:TIA (transient ischemic attack)    Chief Complaint:   Chief Complaint   Patient presents with    Aphasia     Pt reports slurred speech, worsening since Friday, reports left side weakness w/ symptoms        HPI: The patient is a 58-year-old male presents to the ED with complaint of slurred speech.  He states that he 1st noticed it on Friday, 2 days ago.  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  He is currently wearing a towel around his neck, to catch the drool.  He has also noted some general weakness over this time.  The patient has a history of hypertension, dyslipidemia, diabetes and prior CVA.  He denies any current numbness or tingling or focal weakness.        Past Medical History:   Diagnosis Date    Diabetes mellitus type II     Hyperlipidemia     Hypertension     Stroke     Nov 2011, denies deficits        Past Surgical History:   Procedure Laterality Date    ANKLE FRACTURE SURGERY      HAND SURGERY Left     NERVE GRAFT         Review of patient's allergies indicates:   Allergen Reactions    No known drug allergies        No current facility-administered medications on file prior to encounter.      Current Outpatient Medications on File Prior to Encounter   Medication Sig    amitriptyline (ELAVIL) 25 MG tablet Take 1 tablet (25 mg total) by mouth every evening.    aspirin 325 MG tablet Take 325 mg by mouth once daily.    felodipine (PLENDIL) 10 MG 24 hr tablet Take 1 tablet (10 mg total) by mouth once daily.    insulin glargine (LANTUS SOLOSTAR) 100  "unit/mL (3 mL) InPn pen Inject 16 Units into the skin every evening.    lisinopril (PRINIVIL,ZESTRIL) 40 MG tablet Take 1 tablet (40 mg total) by mouth once daily.    metoprolol tartrate (LOPRESSOR) 100 MG tablet Take 1 tablet (100 mg total) by mouth 2 (two) times daily.    aspirin 81 MG Chew Take 1 tablet (81 mg total) by mouth once daily.    metFORMIN (GLUCOPHAGE) 500 MG tablet Take 1 tablet (500 mg total) by mouth 2 (two) times daily with meals.    pen needle, diabetic 31 gauge x " Ndle      Family History     Problem Relation (Age of Onset)    Heart disease Mother, Father        Tobacco Use    Smoking status: Former Smoker     Packs/day: 1.00     Years: 15.00     Pack years: 15.00     Last attempt to quit: 1990     Years since quittin.8    Smokeless tobacco: Never Used   Substance and Sexual Activity    Alcohol use: No     Frequency: Never    Drug use: No    Sexual activity: Yes     Partners: Female     Review of Systems   Constitutional: Positive for fatigue. Negative for activity change, appetite change and fever.   HENT: Negative for congestion, ear pain and postnasal drip.    Eyes: Negative for discharge.   Respiratory: Negative for apnea, shortness of breath and wheezing.    Cardiovascular: Negative for chest pain and leg swelling.   Gastrointestinal: Negative for abdominal distention, abdominal pain, nausea and vomiting.   Endocrine: Negative for polydipsia, polyphagia and polyuria.   Genitourinary: Negative for difficulty urinating, flank pain, frequency, hematuria and urgency.   Musculoskeletal: Positive for arthralgias and myalgias. Negative for joint swelling.   Skin: Negative for pallor and rash.   Allergic/Immunologic: Negative for environmental allergies and food allergies.   Neurological: Positive for speech difficulty and weakness. Negative for dizziness, light-headedness and headaches.   Hematological: Does not bruise/bleed easily.   Psychiatric/Behavioral: Negative for " agitation.     Objective:     Vital Signs (Most Recent):  Temp: 97.5 °F (36.4 °C) (10/29/18 0422)  Pulse: 64 (10/29/18 0422)  Resp: 14 (10/29/18 0422)  BP: (!) 165/96 (10/29/18 0422)  SpO2: (!) 93 % (10/29/18 0422) Vital Signs (24h Range):  Temp:  [97.5 °F (36.4 °C)-98.1 °F (36.7 °C)] 97.5 °F (36.4 °C)  Pulse:  [64-86] 64  Resp:  [14-25] 14  SpO2:  [92 %-96 %] 93 %  BP: (135-170)/(72-96) 165/96     Weight: 117.3 kg (258 lb 9.6 oz)  Body mass index is 38.19 kg/m².    Physical Exam   Constitutional: He is oriented to person, place, and time. He appears well-developed and well-nourished.   HENT:   Head: Normocephalic.   Eyes: Conjunctivae are normal.   Neck: Normal range of motion. Neck supple.   Cardiovascular: Normal rate, normal heart sounds and intact distal pulses. An irregular rhythm present.   Pulses:       Radial pulses are 2+ on the right side, and 2+ on the left side.   Pulmonary/Chest: Effort normal. He has decreased breath sounds in the right lower field and the left lower field.   Abdominal: Soft. Bowel sounds are normal. He exhibits no distension. There is no tenderness.   Musculoskeletal: Normal range of motion.   Neurological: He is alert and oriented to person, place, and time.   Skin: Skin is warm and dry.   Psychiatric: He has a normal mood and affect. His behavior is normal.           Significant Labs:   CBC:   Recent Labs   Lab 10/28/18  2031   WBC 5.33   HGB 13.5*   HCT 39.7*        CMP:   Recent Labs   Lab 10/28/18  2031      K 3.1*      CO2 23   *   BUN 14   CREATININE 1.3   CALCIUM 9.3   PROT 7.3   ALBUMIN 3.7   BILITOT 0.5   ALKPHOS 64   AST 24   ALT 21   ANIONGAP 13   EGFRNONAA 59*       Significant Imaging: I have reviewed all pertinent imaging results/findings within the past 24 hours.    Assessment/Plan:     * TIA (transient ischemic attack)    CT- No acute intracranial abnormalities.  Mild patchy decreased supratentorial white matter attenuation most likely  related to chronic nonspecific small vessel disease, similar to prior.    MRI/MRA Brain Neck  Echo  Venous Doppler  Lipid panel  Consult Neuro     Type 2 diabetes mellitus with neurologic complication    BG- 139    A1c pending  Low dose SSI  BG AC/HS     Hyperlipidemia    Lipid Panel pending           Essential hypertension    Permissive HTN    Hold felodipine         VTE Risk Mitigation (From admission, onward)        Ordered     IP VTE HIGH RISK PATIENT  Once      10/28/18 2355     Place sequential compression device  Until discontinued      10/28/18 2355             Gabriel Adams NP  Department of Hospital Medicine   [facility]

## 2018-10-29 NOTE — PLAN OF CARE
Problem: Patient Care Overview  Goal: Plan of Care Review  Outcome: Ongoing (interventions implemented as appropriate)  Pt remained free of falls. Bed locked in lowest position, call light and personal items within reach. No complaints of pain. Pt ambulates and repositions independently. Will continue to monitor.

## 2018-10-29 NOTE — SUBJECTIVE & OBJECTIVE
"Past Medical History:   Diagnosis Date    Diabetes mellitus type II     Hyperlipidemia     Hypertension     Stroke     2011, denies deficits        Past Surgical History:   Procedure Laterality Date    ANKLE FRACTURE SURGERY      HAND SURGERY Left     NERVE GRAFT         Review of patient's allergies indicates:   Allergen Reactions    No known drug allergies        Current Neurological Medications: asa    No current facility-administered medications on file prior to encounter.      Current Outpatient Medications on File Prior to Encounter   Medication Sig    amitriptyline (ELAVIL) 25 MG tablet Take 1 tablet (25 mg total) by mouth every evening.    aspirin 325 MG tablet Take 325 mg by mouth once daily.    felodipine (PLENDIL) 10 MG 24 hr tablet Take 1 tablet (10 mg total) by mouth once daily.    insulin glargine (LANTUS SOLOSTAR) 100 unit/mL (3 mL) InPn pen Inject 16 Units into the skin every evening.    lisinopril (PRINIVIL,ZESTRIL) 40 MG tablet Take 1 tablet (40 mg total) by mouth once daily.    metFORMIN (GLUCOPHAGE) 500 MG tablet Take 1 tablet (500 mg total) by mouth 2 (two) times daily with meals.    metoprolol tartrate (LOPRESSOR) 100 MG tablet Take 1 tablet (100 mg total) by mouth 2 (two) times daily.    pen needle, diabetic 31 gauge x 5/16" Ndle     [DISCONTINUED] aspirin 81 MG Chew Take 1 tablet (81 mg total) by mouth once daily.     Family History     Problem Relation (Age of Onset)    Heart disease Mother, Father        Tobacco Use    Smoking status: Former Smoker     Packs/day: 1.00     Years: 15.00     Pack years: 15.00     Last attempt to quit: 1990     Years since quittin.8    Smokeless tobacco: Never Used   Substance and Sexual Activity    Alcohol use: No     Frequency: Never    Drug use: No    Sexual activity: Yes     Partners: Female     Review of Systems   Constitutional: Positive for fatigue. Negative for activity change, appetite change and fever.   HENT: " Negative for congestion, ear pain and postnasal drip.    Eyes: Negative for discharge.   Respiratory: Negative for apnea, shortness of breath and wheezing.    Cardiovascular: Negative for chest pain and leg swelling.   Gastrointestinal: Negative for abdominal distention, abdominal pain, nausea and vomiting.   Endocrine: Negative for polydipsia, polyphagia and polyuria.   Genitourinary: Negative for difficulty urinating, flank pain, frequency, hematuria and urgency.   Musculoskeletal: Positive for arthralgias and myalgias. Negative for joint swelling.   Skin: Negative for pallor and rash.   Allergic/Immunologic: Negative for environmental allergies and food allergies.   Neurological: Positive for speech difficulty. Negative for dizziness, weakness, light-headedness and headaches.   Hematological: Does not bruise/bleed easily.   Psychiatric/Behavioral: Negative for agitation.     Objective:     Vital Signs (Most Recent):  Temp: 98.7 °F (37.1 °C) (10/29/18 1228)  Pulse: 66 (10/29/18 1600)  Resp: 18 (10/29/18 1228)  BP: (!) 187/102 (10/29/18 1228)  SpO2: (!) 93 % (10/29/18 1228) Vital Signs (24h Range):  Temp:  [97.5 °F (36.4 °C)-98.7 °F (37.1 °C)] 98.7 °F (37.1 °C)  Pulse:  [62-86] 66  Resp:  [14-25] 18  SpO2:  [92 %-96 %] 93 %  BP: (135-197)/() 187/102     Weight: 117.3 kg (258 lb 9.6 oz)  Body mass index is 38.19 kg/m².    Physical Exam   Constitutional: He appears well-developed and well-nourished.   HENT:   Head: Normocephalic and atraumatic.   Right Ear: Hearing normal.   Left Ear: Hearing normal.   Eyes:   Fundus examination shows sharp disc margins.  Pupils are equal and reactive with EOM being full without nystagmus   Neck: Normal range of motion. Neck supple. Carotid bruit is not present.   Cardiovascular: Normal rate, regular rhythm and normal heart sounds.   Neurological: He is alert. He displays abnormal reflex (DTRs generally depressed to absent distally). He displays no atrophy. No cranial nerve  deficit (Visual fields at bedside testing essentially normal.  No facial asymmetry noted with facial movements and sensory exam being normal/symmetrical.  Corneals/gag reflexes normal.  Tongue & palate movements normal.  Hearing unimpaired.  Shoulder shrug was norm) or sensory deficit. He exhibits normal muscle tone. He displays a negative Romberg sign. Coordination and gait normal. He displays no Babinski's sign on the right side. He displays no Babinski's sign on the left side.   Mental status examination: Patient is fully oriented and able to give an adequate history.  Recall of recent and past information is good.  Immediate recall is normal.  Attention span and concentration was normal.  Judgment and insight is normal.  Language functions are intact with no evidence of aphasia.  He has minimal slurring of speech but no difficulty swallowing.  Comprehension is unimpaired.  Affect is appropriate, mood was even.  No thought disorder is noted.   Vitals reviewed.           Significant Labs: All pertinent lab results from the past 24 hours have been reviewed.    Significant Imaging: I have reviewed all pertinent imaging results/findings within the past 24 hours.

## 2018-10-29 NOTE — PLAN OF CARE
LMSW met with patient at the bedside.     Patient is alert and oriented with no communication barriers.     Prior to admission patient was independent. Patient denies the use of HH or DME.     Patients PCP is correct on the face sheet. Patient choice pharmacy is [facility] in [place].      Patient denies a history of mental illness.     Patients family will transport him home.     CM Team will continue to follow for recommendations.         10/29/18 1125   Discharge Assessment   Assessment Type Discharge Planning Assessment   Confirmed/corrected address and phone number on facesheet? Yes   Assessment information obtained from? Patient   Communicated expected length of stay with patient/caregiver no   Prior to hospitilization cognitive status: Alert/Oriented   Prior to hospitalization functional status: Independent   Current cognitive status: Alert/Oriented   Current Functional Status: Independent   Lives With spouse   Able to Return to Prior Arrangements yes   Is patient able to care for self after discharge? Yes   Patient's perception of discharge disposition home or selfcare   Patient currently being followed by outpatient case management? No   Patient currently receives any other outside agency services? No   Equipment Currently Used at Home none   Do you have any problems affording any of your prescribed medications? No   Is the patient taking medications as prescribed? yes   Does the patient have transportation home? Yes   Transportation Available family or friend will provide   Does the patient receive services at the [facility]? No   Discharge Plan A Home   Patient/Family In Agreement With Plan yes

## 2018-10-29 NOTE — NURSING
Patient free of falls, trauma and injury during duration of shift.  Patient passed SLP evaluation and placed on diet; tolerating well.  Patient ambulates to and from restroom without difficulty.  Bed locked, low and call light within reach.

## 2018-10-29 NOTE — PT/OT/SLP EVAL
"Occupational Therapy   Evaluation and Treatment     Name: Steven Shields  MRN: [id number]  Admitting Diagnosis:  TIA (transient ischemic attack)      Recommendations:     Discharge Recommendations: outpatient PT(for higher level balance concerns; will reassess need closer to D/C)  Discharge Equipment Recommendations:  cane, straight(pending progress with mobility)  Barriers to discharge:  None    History:     Occupational Profile:  Living Environment: Pt lives with fiance in 1-story house with no ALFRED. He has tub-shower with no DME. Raised toilet.   Previous level of function: Pt with previous CVA in 2011 in which he did go to IP rehab. Pt is currently independent with all ADLs, IADLs and functional mobility. 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 He is able to grocery shop, cook and clean however his fiance assists with these tasks.   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 [employer]  Equipment Used at Home:  none  Assistance upon Discharge: patricia works FT, but available to (A) PRN    Past Medical History:   Diagnosis Date    Diabetes mellitus type II     Hyperlipidemia     Hypertension     Stroke     Nov 2011, denies deficits        Past Surgical History:   Procedure Laterality Date    ANKLE FRACTURE SURGERY      HAND SURGERY Left     NERVE GRAFT         Subjective     Chief Complaint: "My slurred speech comes and goes."  Patient/Family Comments/goals: return to PLOF    Pain/Comfort:  · Pain Rating 1: 0/10  · Pain Rating Post-Intervention 1: 0/10    Patients cultural, spiritual, Congregational conflicts given the current situation: none    Objective:     Communicated with: RN prior to session.  Patient found all lines intact, call button in reach and RN notified and peripheral IV upon OT entry to room.    General Precautions: Standard, fall   Orthopedic Precautions:N/A   Braces: N/A     Occupational Performance:    Bed Mobility:  "   · Patient completed Scooting/Bridging with supervision  · Patient completed Supine to Sit with supervision    Functional Mobility/Transfers:  · Patient completed Sit <> Stand Transfer with stand by assistance  with  rolling walker   · Patient completed Toilet Transfer Step Transfer technique with contact guard assistance with  no AD  · Functional Mobility: house-hold distance (~80 ft collectively) with no AD and CGA; did observe swaying and deviated gait path; no LOB     Activities of Daily Living:  · Grooming: stand by assistance standing at sink to brush teeth and wash face  · Upper Body Dressing: stand by assistance to don gown like robe while seated EOB  · Lower Body Dressing: contact guard assistance for dynamic sitting balance while doffing/donning john socks using crossed leg technique  · Toileting: stand by assistance for static standing to urinate     Cognitive/Visual Perceptual:  Cognitive/Psychosocial Skills:     -       Oriented to: Person, Place, Time and Situation   -       Follows Commands/attention:Follows multistep  commands  -       Communication: slurred speech  -       Safety awareness/insight to disability: impaired   -       Mood/Affect/Coping skills/emotional control: Appropriate to situation  Visual/Perceptual:      -Intact  -(-) glasses. denies double vision or blurriness.      Physical Exam:  Balance:    -       static sitting with SPV; dynamic sitting with SBA-CGA; static standing with SBA; dynamic standing with CGA  Postural examination/scapula alignment:    -       No postural abnormalities identified  Skin integrity: Visible skin intact  Edema:  None noted  Sensation:    -      Impaired (L) thumb, ring and little fingers from previous crush injury   Motor Planning:    -       WFL  Dominant hand:    -       Right   Upper Extremity Range of Motion:     -       Right Upper Extremity: WNL   -       Left Upper Extremity: WNL, except unable to fully extend (L) ring and little fingers from  previous crush injury   Upper Extremity Strength:    -       Right Upper Extremity: 5/5  -       Left Upper Extremity: 5/5    Strength: -       Right Upper Extremity: 5/5   -       Left Upper Extremity: 5/5   Fine Motor Coordination:  clumsy during manipulation of ADL objects   Gross motor coordination: WFL    AMPAC 6 Click ADL:  AMPAC Total Score: 19    Treatment & Education:  OT role and POC.     Additional time spent completing morning ADL routine. PT also present for evaluation and completed more extensive gait analysis during functional mobility at house-hold level.     Vitals Throughout Session  Supine:   /96  PA 63  SPO2 100% on RA    Seated EOB  /99  PA 65  SPO2 100% on RA    Standing at EOB  /92  PA 68    After ADL routine and functional mobility within room:  /102  PA 69    RN made aware of elevated BP. Cleared him to stay UI and retrieving pt's BP medication.   Education:    Patient left up in chair with all lines intact, call button in reach and RN notified    Assessment:     Steven Shields is a 58 y.o. male with a medical diagnosis of TIA (transient ischemic attack).  He presents with the following performance deficits affecting function: impaired functional mobilty, gait instability, impaired balance, impaired self care skills, decreased coordination, decreased safety awareness.      Pt tolerated his OT evaluation fairly well. Hypertensive following ADL routine and functional mobility at house-hold level. RN made aware and to follow up with BP medication. Pt completed majority of ADL routine at SBA-CGA level pending dynamic balance demands. Functional mobility was completed with CGA and no AD. No LOB, however did observe swaying and gait deviation from straight path. Pt's B UE strength and AROM WNL. Did note some mild FM incoordination when manipulating G/H utensils. Pt reports numbness in (L) thumb, ring and little fingers however this is his baseline from previous crush  "injury. Continue to note slurred speech. Pt is close to his functional baseline, however would recommend ongoing acute OT services to maximize his safety and return to high PLOF. Recommending follow up with OP PT for higher level balance deficits pending progress acutely. Possible SPC upon D/C as well for improved safety/balance during gait. Again, will continue to reassess as pt getting closer to D/C.     Rehab Prognosis: Good; patient would benefit from acute skilled OT services to address these deficits and reach maximum level of function.         Clinical Decision Makin.  OT Low:  "Pt evaluation falls under low complexity for evaluation coding due to performance deficits noted in 1-3 areas as stated above and 0 co-morbities affecting current functional status. Data obtained from problem focused assessments. No modifications or assistance was required for completion of evaluation. Only brief occupational profile and history review completed."     Plan:     Patient to be seen 5 x/week to address the above listed problems via self-care/home management, therapeutic activities, therapeutic exercises  · Plan of Care Expires: 18  · Plan of Care Reviewed with: patient    This Plan of care has been discussed with the patient who was involved in its development and understands and is in agreement with the identified goals and treatment plan    GOALS:   Multidisciplinary Problems     Occupational Therapy Goals        Problem: Occupational Therapy Goal    Goal Priority Disciplines Outcome Interventions   Occupational Therapy Goal     OT, PT/OT Ongoing (interventions implemented as appropriate)    Description:  Goals to be met by: 2018     Patient will increase functional independence with ADLs by performing:    UE Dressing with Set-up Assistance.  LE Dressing with Supervision.  Grooming while standing at sink with Supervision.  Toileting from toilet with Supervision for hygiene and clothing management. "   Step transfer with Supervision  Toilet transfer to toilet with Supervision.  Functional mobility at house-hold level with no AD and SPV.                       Time Tracking:     OT Date of Treatment: 10/29/18  OT Start Time: 0856  OT Stop Time: 0933  OT Total Time (min): 37 min    Billable Minutes:Evaluation 27  Self Care/Home Management 10    JT Becker/RHIANNON  10/29/2018

## 2018-10-29 NOTE — PLAN OF CARE
Problem: Physical Therapy Goal  Goal: Physical Therapy Goal  Goals to be met by: 10/5/18    Patient will increase functional independence with mobility by performin. Sit<>stand transfer with Mod I, no AD.   2. Gait > 150 feet with SBA using SPC or no AD, minimal to no deviation of path.   3. Tolerate standing with eyes open on uneven surface > 20 seconds with SBA  4. Tolerate standing with eyes closed on uneven surface > 20 seconds with SBA    Outcome: Ongoing (interventions implemented as appropriate)  Goals established today. Patient is fairly independent with mobility at this time, but would benefit from acute PT services to improve dynamic balance and optimize safety when ambulating. Patient noted to have increased sway and slight deviation of path (to R side) when ambulating. Plan to assess use of SPC when ambulating next session.

## 2018-10-29 NOTE — HPI
This is a 58-year-old  male who presented emergency room with complaints of slurring of speech yesterday evening.  He 1st reported that he noted problem on Friday 10/25/2018 however it initially improved but as it did not go away completely he came to the emergency room.  He was concerned about this as he reports that he had a stroke in 2011 but gradually recovered after having spent time in rehab.  He denies any headaches or swallowing difficulties.  Vascular risk factors include hypertension, dyslipidemia, diabetes and prior CVA.  He denies any current numbness or tingling or focal weakness.

## 2018-10-29 NOTE — ASSESSMENT & PLAN NOTE
The patient clinical findings and neurological findings are consistent with an acute ischemic infarct most likely lacunar in type affecting the pontine region.  His multiple risk factors including hypertension, hyperlipidemia and diabetes.  He does have a history of a prior stroke in 2011 with excellent recovery.  Symptoms have improved since admission to the hospital.    Recommendations:  Discussed with patient. Control of vascular risk factors specially hypertension and diabetes.  Recommend continued outpatient speech therapy.  Continue anti-platelet agents (aspirin 81 mg daily).  If medically stable the patient may be discharged to home.

## 2018-10-29 NOTE — PHARMACY MED REC
"Admission Medication Reconciliation - Pharmacy Consult Note    The home medication history was taken by Eliane Kidd CPhT.  Based on information gathered and subsequent review by the clinical pharmacist, the items below may need attention.    You may go to "Admission" then "Reconcile Home Medications" tabs to review and/or act upon these items.    No issues noted with the medication reconciliation.    Medications Prior to Admission   Medication Sig Dispense Refill Last Dose    amitriptyline (ELAVIL) 25 MG tablet Take 1 tablet (25 mg total) by mouth every evening. 30 tablet 2 10/28/2018    aspirin 325 MG tablet Take 325 mg by mouth once daily.   10/28/2018    felodipine (PLENDIL) 10 MG 24 hr tablet Take 1 tablet (10 mg total) by mouth once daily. 30 tablet 3 10/28/2018    insulin glargine (LANTUS SOLOSTAR) 100 unit/mL (3 mL) InPn pen Inject 16 Units into the skin every evening. 15 mL 2 10/28/2018    lisinopril (PRINIVIL,ZESTRIL) 40 MG tablet Take 1 tablet (40 mg total) by mouth once daily. 30 tablet 3 10/28/2018    metFORMIN (GLUCOPHAGE) 500 MG tablet Take 1 tablet (500 mg total) by mouth 2 (two) times daily with meals. 60 tablet 3     metoprolol tartrate (LOPRESSOR) 100 MG tablet Take 1 tablet (100 mg total) by mouth 2 (two) times daily. 60 tablet 3 10/28/2018    pen needle, diabetic 31 gauge x 5/16" Ndle    Not Taking       Please address this information as you see fit.  Feel free to contact us if you have any questions or require assistance.    Sina Anderson, Pharm.D., BCPS  [phone]                .  .          "

## 2018-10-29 NOTE — ED NOTES
Attempted to call report, accepting RN unable to take report at this time and will return call

## 2018-10-29 NOTE — HOSPITAL COURSE
Following admission to the hospital his speech difficulty has significantly improved.  He has no swallowing difficulty.  He was evaluated by speech therapy and advised follow-up speech therapy as an outpatient.  A CT scan of the brain done yesterday in the emergency showed no acute intracranial abnormalities.  There was evidence of chronic nonspecific small vessel disease similar to his prior CT scan.  He subsequently underwent an MRI scan of the brain done earlier this morning that showed evidence of recent infarctions in the distribution of the pontine perforators, involving the belly of the fabiola, without acute hemorrhages or significant mass effects.  Supratentorial cerebral white matter changes compatible with chronic microvascular ischemia.  An MRA the brain was normal and an MRA of the neck showed no significant stenotic disease of the carotid and vertebral arteries bilaterally.

## 2018-10-29 NOTE — PT/OT/SLP EVAL
Physical Therapy Evaluation    Patient Name:  Steven Shields   MRN:  [id number]    Recommendations:     Discharge Recommendations:  outpatient PT   Discharge Equipment Recommendations: cane, straight(pending progress with mobility)   Barriers to discharge: Decreased caregiver support. Patient lives with fiance but fiance works. Patient has no family/friends in the area who could provide additional care.     Assessment:     Steven Shields is a 58 y.o. male admitted with a medical diagnosis of TIA (transient ischemic attack).  He presents with the following impairments/functional limitations:  impaired functional mobilty, decreased safety awareness, impaired cardiopulmonary response to activity, gait instability. These impairments inhibit the patient from independently and safely participating in desired functional tasks such as, ambulating within home, job responsibilities working at a [employer], and community ambulation.     Goals established today. Patient is fairly independent with mobility at this time, but would benefit from acute PT services to improve dynamic balance and optimize safety when ambulating. Patient noted to have increased sway and slight deviation of path (to R side) when ambulating. Plan to assess use of SPC when ambulating next session.     Rehab Prognosis:  Excellent; patient would benefit from acute skilled PT services to address these deficits and reach maximum level of function.      Recent Surgery: * No surgery found *      Plan:     During this hospitalization, patient to be seen 5 x/week to address the above listed problems via gait training, therapeutic activities, therapeutic exercises  · Plan of Care Expires:  11/28/18   Plan of Care Reviewed with: patient    Subjective     Communicated with RN prior to session.  Patient found supine upon PT entry to room, agreeable to evaluation.      Chief Complaint: Had no received BP medication. Denied pain, discomfort, and/or dizziness  Patient  "comments/goals: Patient stated he has tingling of digits 1-3 on L hand from previous injury. Patient has numbness of digits 4 and 5 on L hand (dorsal and palmar aspects) along the ulnar distribution from previous injury.   Pain/Comfort:  · Pain Rating 1: 0/10  · Pain Rating Post-Intervention 1: 0/10    Patients cultural, spiritual, Christianity conflicts given the current situation: none stated    Living Environment:  · Patient lives with anthony in one-story home with no steps to enter  · No family/friends nearby; family dispersed across [place]  · Previously independent with all mobility tasks. 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  · Patient has the following equipment: none.     · Upon discharge, patient will have minimal assistance from fiance    Objective:   General Precautions: Standard, fall   Orthopedic Precautions:N/A   Braces: N/A     · Cognition:   · Patient is oriented to name, , location, and situation.  · Pt follows approximately 100% of multi-step commands.    · Mood: Pleasant and cooperative.   · Musculoskeletal:  · Posture:    · -       No postural abnormalities identified  · LE ROM/Strength: 5/5 BLE  · Neuromuscular:  · Sensation: Intact to light touch bilateral LEs. Denied paresthesias.   · Coordination/Tone/Reflexes: No impairments identified with functional mobility. No formal testing performed.   · Balance: static standing: SBA; dynamic standing: CGA  · Visual-vestibular: No impairments identified with functional mobility. No formal testing performed.  · Integument: Visible skin intact  · Cardiopulmonary:  · Vital signs:   · Beginning of session_supine: BP: 181/96, HR: 63  · Sitting up EOB: BP:149/99  · Sitting in chair end of session: BP: 213/102  · RN notified of BP. RN stated she was comfortable keeping patient in care if patient preferred to sit in chair compared to bed      Functional " Mobility:  · Bed Mobility:     · Supine to Sit: supervision  · Transfers:     · Sit to Stand:  contact guard assistance with RW  · Verbal cues and demonstration for proper use of RW  · Gait:   · amb. 80' with SBA/CGA no AD  · Initially began ambulating with RW but due to good stability and patient preference, transitioned to no AD  · Noted to have R sided deviation and increased lateral sway (within coronal plane) when ambulating  · Symmetrical, even step length bilaterally   · Balance:   · static standing: SBA. dynamic standing: CGA    AM-PAC 6 CLICK MOBILITY  Total Score:20     Patient left up in chair with all lines intact, call button in reach and RN notified specifically of elevated BP.    GOALS:   Multidisciplinary Problems     Physical Therapy Goals        Problem: Physical Therapy Goal    Goal Priority Disciplines Outcome Goal Variances Interventions   Physical Therapy Goal     PT, PT/OT Ongoing (interventions implemented as appropriate)     Description:  Goals to be met by: 10/5/18    Patient will increase functional independence with mobility by performin. Sit<>stand transfer with Mod I, no AD.   2. Gait > 150 feet with SBA using SPC or no AD, minimal to no deviation of path.   3. Tolerate standing with eyes open on uneven surface > 20 seconds with SBA  4. Tolerate standing with eyes closed on uneven surface > 20 seconds with SBA                      History:     Past Medical History:   Diagnosis Date    Diabetes mellitus type II     Hyperlipidemia     Hypertension     Stroke     2011, denies deficits        Past Surgical History:   Procedure Laterality Date    ANKLE FRACTURE SURGERY      HAND SURGERY Left     NERVE GRAFT         Clinical Decision Making:     History  Co-morbidities and personal factors that may impact the plan of care Examination  Body Structures and Functions, activity limitations and participation restrictions that may impact the plan of care Clinical Presentation    Decision Making/ Complexity Score   Co-morbidities:   [] Time since onset of injury / illness / exacerbation  [] Status of current condition  []Patient's cognitive status and safety concerns    [] Multiple Medical Problems (see med hx)  Personal Factors:   [] Patient's age  [] Prior Level of function   [] Patient's home situation (environment and family support)  [] Patient's level of motivation  [] Expected progression of patient      HISTORY:(criteria)    [] 98540 - no personal factors/history    [] 10868 - has 1-2 personal factor/comorbidity     [] 43644 - has >3 personal factor/comorbidity     Body Regions:  [] Objective examination findings  [] Head     []  Neck  [] Trunk   [] Upper Extremity  [] Lower Extremity    Body Systems:  [] For communication ability, affect, cognition, language, and learning style: the assessment of the ability to make needs known, consciousness, orientation (person, place, and time), expected emotional /behavioral responses, and learning preferences (eg, learning barriers, education  needs)  [] For the neuromuscular system: a general assessment of gross coordinated movement (eg, balance, gait, locomotion, transfers, and transitions) and motor function  (motor control and motor learning)  [] For the musculoskeletal system: the assessment of gross symmetry, gross range of motion, gross strength, height, and weight  [] For the integumentary system: the assessment of pliability(texture), presence of scar formation, skin color, and skin integrity  [] For cardiovascular/pulmonary system: the assessment of heart rate, respiratory rate, blood pressure, and edema     Activity limitations:    [] Patient's cognitive status and saf ety concerns          [] Status of current condition      [] Weight bearing restriction  [] Cardiopulmunary Restriction    Participation Restrictions:   [] Goals and goal agreement with the patient     [] Rehab potential (prognosis) and probable outcome       Examination of Body System: (criteria)    [] 77899 - addressing 1-2 elements    [] 54909 - addressing a total of 3 or more elements     [] 54863 -  Addressing a total of 4 or more elements         Clinical Presentation: (criteria)  Choose one     On examination of body system using standardized tests and measures patient presents with (CHOOSE ONE) elements from any of the following: body structures and functions, activity limitations, and/or participation restrictions.  Leading to a clinical presentation that is considered (CHOOSE ONE)                              Clinical Decision Making  (Eval Complexity):  Choose One     Time Tracking:     PT Received On: 10/29/18  PT Start Time: 0856     PT Stop Time: 0934  PT Total Time (min): 38 min   Co-treat with OT    Billable Minutes: Evaluation 30 and Gait Training 8      Layla Shay, PT  10/29/2018

## 2018-10-29 NOTE — CONSULTS
[facility]  Neurology  Consult Note    Patient Name: Steven Shields  MRN: [id number]  Admission Date: 10/28/2018  Hospital Length of Stay: 1 days  Code Status: Full Code   Attending Provider: Devin Mi MD   Consulting Provider: Jacob Sveerino MD  Primary Care Physician: Julian Carson MD  Principal Problem:TIA (transient ischemic attack)    Consults   Subjective:     Chief Complaint:  stroke     HPI:   This is a 58-year-old  male who presented emergency room with complaints of slurring of speech yesterday evening.  He 1st reported that he noted problem on Friday 10/25/2018 however it initially improved but as it did not go away completely he came to the emergency room.  He was concerned about this as he reports that he had a stroke in 2011 but gradually recovered after having spent time in rehab.  He denies any headaches or swallowing difficulties.  Vascular risk factors include hypertension, dyslipidemia, diabetes and prior CVA.  He denies any current numbness or tingling or focal weakness.     Past Medical History:   Diagnosis Date    Diabetes mellitus type II     Hyperlipidemia     Hypertension     Stroke     Nov 2011, denies deficits        Past Surgical History:   Procedure Laterality Date    ANKLE FRACTURE SURGERY      HAND SURGERY Left     NERVE GRAFT         Review of patient's allergies indicates:   Allergen Reactions    No known drug allergies        Current Neurological Medications: asa    No current facility-administered medications on file prior to encounter.      Current Outpatient Medications on File Prior to Encounter   Medication Sig    amitriptyline (ELAVIL) 25 MG tablet Take 1 tablet (25 mg total) by mouth every evening.    aspirin 325 MG tablet Take 325 mg by mouth once daily.    felodipine (PLENDIL) 10 MG 24 hr tablet Take 1 tablet (10 mg total) by mouth once daily.    insulin glargine (LANTUS SOLOSTAR) 100 unit/mL (3 mL) InPn pen Inject  "16 Units into the skin every evening.    lisinopril (PRINIVIL,ZESTRIL) 40 MG tablet Take 1 tablet (40 mg total) by mouth once daily.    metFORMIN (GLUCOPHAGE) 500 MG tablet Take 1 tablet (500 mg total) by mouth 2 (two) times daily with meals.    metoprolol tartrate (LOPRESSOR) 100 MG tablet Take 1 tablet (100 mg total) by mouth 2 (two) times daily.    pen needle, diabetic 31 gauge x 5/16" Ndle     [DISCONTINUED] aspirin 81 MG Chew Take 1 tablet (81 mg total) by mouth once daily.     Family History     Problem Relation (Age of Onset)    Heart disease Mother, Father        Tobacco Use    Smoking status: Former Smoker     Packs/day: 1.00     Years: 15.00     Pack years: 15.00     Last attempt to quit: 1990     Years since quittin.8    Smokeless tobacco: Never Used   Substance and Sexual Activity    Alcohol use: No     Frequency: Never    Drug use: No    Sexual activity: Yes     Partners: Female     Review of Systems   Constitutional: Positive for fatigue. Negative for activity change, appetite change and fever.   HENT: Negative for congestion, ear pain and postnasal drip.    Eyes: Negative for discharge.   Respiratory: Negative for apnea, shortness of breath and wheezing.    Cardiovascular: Negative for chest pain and leg swelling.   Gastrointestinal: Negative for abdominal distention, abdominal pain, nausea and vomiting.   Endocrine: Negative for polydipsia, polyphagia and polyuria.   Genitourinary: Negative for difficulty urinating, flank pain, frequency, hematuria and urgency.   Musculoskeletal: Positive for arthralgias and myalgias. Negative for joint swelling.   Skin: Negative for pallor and rash.   Allergic/Immunologic: Negative for environmental allergies and food allergies.   Neurological: Positive for speech difficulty. Negative for dizziness, weakness, light-headedness and headaches.   Hematological: Does not bruise/bleed easily.   Psychiatric/Behavioral: Negative for agitation. "     Objective:     Vital Signs (Most Recent):  Temp: 98.7 °F (37.1 °C) (10/29/18 1228)  Pulse: 66 (10/29/18 1600)  Resp: 18 (10/29/18 1228)  BP: (!) 187/102 (10/29/18 1228)  SpO2: (!) 93 % (10/29/18 1228) Vital Signs (24h Range):  Temp:  [97.5 °F (36.4 °C)-98.7 °F (37.1 °C)] 98.7 °F (37.1 °C)  Pulse:  [62-86] 66  Resp:  [14-25] 18  SpO2:  [92 %-96 %] 93 %  BP: (135-197)/() 187/102     Weight: 117.3 kg (258 lb 9.6 oz)  Body mass index is 38.19 kg/m².    Physical Exam   Constitutional: He appears well-developed and well-nourished.   HENT:   Head: Normocephalic and atraumatic.   Right Ear: Hearing normal.   Left Ear: Hearing normal.   Eyes:   Fundus examination shows sharp disc margins.  Pupils are equal and reactive with EOM being full without nystagmus   Neck: Normal range of motion. Neck supple. Carotid bruit is not present.   Cardiovascular: Normal rate, regular rhythm and normal heart sounds.   Neurological: He is alert. He displays abnormal reflex (DTRs generally depressed to absent distally). He displays no atrophy. No cranial nerve deficit (Visual fields at bedside testing essentially normal.  No facial asymmetry noted with facial movements and sensory exam being normal/symmetrical.  Corneals/gag reflexes normal.  Tongue & palate movements normal.  Hearing unimpaired.  Shoulder shrug was norm) or sensory deficit. He exhibits normal muscle tone. He displays a negative Romberg sign. Coordination and gait normal. He displays no Babinski's sign on the right side. He displays no Babinski's sign on the left side.   Mental status examination: Patient is fully oriented and able to give an adequate history.  Recall of recent and past information is good.  Immediate recall is normal.  Attention span and concentration was normal.  Judgment and insight is normal.  Language functions are intact with no evidence of aphasia.  He has minimal slurring of speech but no difficulty swallowing.  Comprehension is  unimpaired.  Affect is appropriate, mood was even.  No thought disorder is noted.   Vitals reviewed.           Significant Labs: All pertinent lab results from the past 24 hours have been reviewed.    Significant Imaging: I have reviewed all pertinent imaging results/findings within the past 24 hours.    Assessment and Plan:     CVA (cerebral vascular accident)    The patient clinical findings and neurological findings are consistent with an acute ischemic infarct most likely lacunar in type affecting the pontine region.  His multiple risk factors including hypertension, hyperlipidemia and diabetes.  He does have a history of a prior stroke in 2011 with excellent recovery.  Symptoms have improved since admission to the hospital.    Recommendations:  Discussed with patient. Control of vascular risk factors specially hypertension and diabetes.  Recommend continued outpatient speech therapy.  Continue anti-platelet agents (aspirin 81 mg daily).  If medically stable the patient may be discharged to home.         VTE Risk Mitigation (From admission, onward)        Ordered     IP VTE HIGH RISK PATIENT  Once      10/28/18 2355     Place sequential compression device  Until discontinued      10/28/18 2355          Thank you for your consult. I will sign off. Please contact us if you have any additional questions.    Jacob Severino MD  Neurology  [facility]

## 2018-10-30 VITALS
DIASTOLIC BLOOD PRESSURE: 82 MMHG | OXYGEN SATURATION: 94 % | RESPIRATION RATE: 22 BRPM | SYSTOLIC BLOOD PRESSURE: 148 MMHG | WEIGHT: 258.63 LBS | BODY MASS INDEX: 38.31 KG/M2 | TEMPERATURE: 98 F | HEART RATE: 76 BPM | HEIGHT: 69 IN

## 2018-10-30 PROBLEM — G45.9 TIA (TRANSIENT ISCHEMIC ATTACK): Chronic | Status: RESOLVED | Noted: 2018-10-28 | Resolved: 2018-10-30

## 2018-10-30 LAB
POCT GLUCOSE: 118 MG/DL (ref 70–110)
POCT GLUCOSE: 118 MG/DL (ref 70–110)

## 2018-10-30 PROCEDURE — 25000003 PHARM REV CODE 250: Performed by: HOSPITALIST

## 2018-10-30 PROCEDURE — 25000003 PHARM REV CODE 250: Performed by: NURSE PRACTITIONER

## 2018-10-30 PROCEDURE — 97116 GAIT TRAINING THERAPY: CPT

## 2018-10-30 PROCEDURE — 97110 THERAPEUTIC EXERCISES: CPT

## 2018-10-30 PROCEDURE — 99239 HOSP IP/OBS DSCHRG MGMT >30: CPT | Mod: ,,, | Performed by: HOSPITALIST

## 2018-10-30 PROCEDURE — G8980 MOBILITY D/C STATUS: HCPCS | Mod: CJ

## 2018-10-30 PROCEDURE — 92526 ORAL FUNCTION THERAPY: CPT

## 2018-10-30 PROCEDURE — G8979 MOBILITY GOAL STATUS: HCPCS | Mod: CI

## 2018-10-30 PROCEDURE — 94761 N-INVAS EAR/PLS OXIMETRY MLT: CPT

## 2018-10-30 RX ORDER — ASPIRIN 81 MG/1
81 TABLET ORAL DAILY
Status: DISCONTINUED | OUTPATIENT
Start: 2018-10-31 | End: 2018-10-30 | Stop reason: HOSPADM

## 2018-10-30 RX ORDER — ASPIRIN 81 MG/1
81 TABLET ORAL DAILY
Qty: 60 TABLET | Refills: 1 | Status: SHIPPED | OUTPATIENT
Start: 2018-10-30

## 2018-10-30 RX ORDER — LISINOPRIL 40 MG/1
40 TABLET ORAL DAILY
Qty: 30 TABLET | Refills: 1 | Status: SHIPPED | OUTPATIENT
Start: 2018-10-30 | End: 2018-12-29

## 2018-10-30 RX ORDER — METFORMIN HYDROCHLORIDE 500 MG/1
500 TABLET ORAL 2 TIMES DAILY WITH MEALS
Qty: 60 TABLET | Refills: 1 | Status: SHIPPED | OUTPATIENT
Start: 2018-10-30 | End: 2018-12-29

## 2018-10-30 RX ORDER — NIFEDIPINE 30 MG/1
30 TABLET, EXTENDED RELEASE ORAL DAILY
Qty: 30 TABLET | Refills: 1 | Status: SHIPPED | OUTPATIENT
Start: 2018-10-31 | End: 2018-12-28

## 2018-10-30 RX ORDER — METOPROLOL TARTRATE 100 MG/1
100 TABLET ORAL 2 TIMES DAILY
Qty: 60 TABLET | Refills: 1 | Status: SHIPPED | OUTPATIENT
Start: 2018-10-30 | End: 2019-01-28 | Stop reason: SDUPTHER

## 2018-10-30 RX ORDER — ATORVASTATIN CALCIUM 80 MG/1
80 TABLET, FILM COATED ORAL DAILY
Qty: 30 TABLET | Refills: 1 | Status: SHIPPED | OUTPATIENT
Start: 2018-10-30 | End: 2018-12-28

## 2018-10-30 RX ADMIN — LISINOPRIL 40 MG: 20 TABLET ORAL at 08:10

## 2018-10-30 RX ADMIN — ASPIRIN 325 MG ORAL TABLET 325 MG: 325 PILL ORAL at 08:10

## 2018-10-30 RX ADMIN — METOPROLOL TARTRATE 100 MG: 50 TABLET ORAL at 08:10

## 2018-10-30 RX ADMIN — NIFEDIPINE 30 MG: 30 TABLET, FILM COATED, EXTENDED RELEASE ORAL at 08:10

## 2018-10-30 NOTE — PLAN OF CARE
Problem: Patient Care Overview  Goal: Individualization & Mutuality  Outcome: Ongoing (interventions implemented as appropriate)  Pt free of falls/trauma/injuries this shift.VSS. Medication administered as perscribed. Patient tolerating POC well. Pt verbalizes understanding of care. Pt denies any chest pain, SOB, or any other discomforts at this time. Will continue to monitor.

## 2018-10-30 NOTE — PLAN OF CARE
Problem: SLP Goal  Goal: SLP Goal  1. Pt will be able to consume a regular diet with thin liquids without overt s/s of aspiration or airway threat given no assistance.   2. Pt will be able to increase intelligibility to 100% at the conversational level given min cues to dcr rate of speech and  increase articulatory precision.   GOALS ADDED:  3. Pt will be able to produce sustained phonation for a minimum of 10 seconds given min cues.   Outcome: Ongoing (interventions implemented as appropriate)  Pt seen for ongoing assessment and remediation of motor speech deficits.    Comments: Speech therapy to follow up 3-5x per week for ongoing assessment and remediation of motor speech deficits and to monitor diet tolerance.

## 2018-10-30 NOTE — PLAN OF CARE
Pt discharging home today. Friend to transport.    Pt has an appointment arranged for out patient speech therapy.    Pt confirmed no further CM needs or barriers for discharge.     10/30/18 1306   Final Note   Assessment Type Final Discharge Note   Anticipated Discharge Disposition Home   What phone number can be called within the next 1-3 days to see how you are doing after discharge? [phone]   Hospital Follow Up  Appt(s) scheduled? Yes   Discharge plans and expectations educations in teach back method with documentation complete? Yes   Right Care Referral Info   Post Acute Recommendation No Care

## 2018-10-30 NOTE — PT/OT/SLP DISCHARGE
Occupational Therapy Discharge Summary    Steven Shields  MRN: [id number]   Principal Problem: CVA (cerebral vascular accident)      Patient Discharged from acute Occupational Therapy on 10/30/18.  Please refer to prior OT note dated 10/29/18 for functional status.    Assessment:      Patient appropriate for care in another setting.    Objective:     GOALS:   Multidisciplinary Problems     Occupational Therapy Goals        Problem: Occupational Therapy Goal    Goal Priority Disciplines Outcome Interventions   Occupational Therapy Goal     OT, PT/OT Ongoing (interventions implemented as appropriate)    Description:  Goals to be met by: 11/28/2018     Patient will increase functional independence with ADLs by performing:    UE Dressing with Set-up Assistance.  LE Dressing with Supervision.  Grooming while standing at sink with Supervision.  Toileting from toilet with Supervision for hygiene and clothing management.   Step transfer with Supervision  Toilet transfer to toilet with Supervision.  Functional mobility at house-hold level with no AD and SPV.                       Reasons for Discontinuation of Therapy Services  Transfer to alternate level of care.      Plan:     Patient Discharged to: Outpatient Therapy Services     OT of record unavailable for documentation.    GUILLE Begum  10/30/2018

## 2018-10-30 NOTE — DISCHARGE SUMMARY
[facility] Hospital Medicine  Discharge Summary      Patient Name: Steven Shields  MRN: [id number]  Admission Date: 10/28/2018  Hospital Length of Stay: 2 days  Discharge Date and Time:  10/30/2018 9:32 AM  Attending Physician: Chris Gr MD   Discharging Provider: Chris Gr MD  Primary Care Provider: Julian Carson MD      HPI:   The patient is a 58-year-old male presents to the ED with complaint of slurred speech.  He states that he 1st noticed it on Friday, 2 days ago.  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  He is currently wearing a towel around his neck, to catch the drool.  He has also noted some general weakness over this time.  The patient has a history of hypertension, dyslipidemia, diabetes and prior CVA.  He denies any current numbness or tingling or focal weakness.    Hospital Course:   Mr. Shields is a 58 year old man with a history of hypertension who was admitted for evaluation of slurred speech and was diagnosed with acute ischemic stroke in the pontine perforators, involving the belly of the fabiola.  He was treated with permissive hypertension, euthermia and euglycemia.  Echocardiogram was obtained and showed no valvular lesions, no wall motion abnormalities and a normal ejection fraction.  MRI of neck was obtained which showed no evidence of significant carotid stenosis.  He was monitored on telemetry without any evidence of arrhythmia.  Most likely this was due to uncontrolled hypertension.  His blood pressure was controlled.  Lipid panel was obtained and he was started on a high intensity statin.  He was previously on aspirin 325mg daily at home but Dr. Severino has recommended aspirin 81mg po daily at discharge.  He will resume his home metformin and insulin at the time of discharge.  He was seen by PT, OT, and SLP.  His symptoms of slurred speech have nearly resolved.  He will be  discharged and will continue with outpatient speech therapy.  He is instructed to follow up with his pcp within 2 weeks and with neurology, Dr. Severino, within 1 month.       Consults:   Consults (From admission, onward)        Status Ordering Provider     Inpatient consult to Neurology  Once     Provider:  Jacob Severino MD    Acknowledged JANET BARAKAT          Final Active Diagnoses:      Problems Resolved During this Admission:    Diagnosis Date Noted Date Resolved POA    PRINCIPAL PROBLEM:  CVA (cerebral vascular accident) [I63.9] 09/19/2012 10/30/2018 Yes    TIA (transient ischemic attack) [G45.9] 10/28/2018 10/30/2018 Yes     Chronic    Hyperlipidemia [E78.5]  10/30/2018 Yes    Type 2 diabetes mellitus with neurologic complication [E11.49]  10/30/2018 Yes    Essential hypertension [I10]  10/30/2018 Yes       Discharged Condition: good    Disposition: Home or Self Care    Follow Up:  Follow-up Information     Julian Carson MD In 1 week.    Specialty:  Internal Medicine  Contact information:  [address]  [address]  [facility] [id number] [phone]             Jacob Severino MD In 1 month.    Specialty:  Neurology  Contact information:  [address]  [facility] [id number] [phone]                 Patient Instructions:      Referral to OutPatient Speech Therapy   Referral Priority: Routine Referral Type: Speech Therapy   Referral Reason: Specialty Services Required   Requested Specialty: Speech Pathology   Number of Visits Requested: 1     Ambulatory referral to Neurology   Referral Priority: Routine Referral Type: Consultation   Referral Reason: Specialty Services Required   Requested Specialty: Neurology   Number of Visits Requested: 1     Diet diabetic     Diet Cardiac     Notify your health care provider if you experience any of the following:  increased confusion or weakness     Notify your health care provider if you experience any of the following:   persistent dizziness, light-headedness, or visual disturbances     Notify your health care provider if you experience any of the following:  worsening rash     Notify your health care provider if you experience any of the following:  severe persistent headache     Notify your health care provider if you experience any of the following:  severe uncontrolled pain     Notify your health care provider if you experience any of the following:  persistent nausea and vomiting or diarrhea     Notify your health care provider if you experience any of the following:  difficulty breathing or increased cough     Activity as tolerated       Significant Diagnostic Studies: Labs:   BMP:   Recent Labs   Lab 10/28/18  2031   *      K 3.1*      CO2 23   BUN 14   CREATININE 1.3   CALCIUM 9.3   , CMP   Recent Labs   Lab 10/28/18  2031      K 3.1*      CO2 23   *   BUN 14   CREATININE 1.3   CALCIUM 9.3   PROT 7.3   ALBUMIN 3.7   BILITOT 0.5   ALKPHOS 64   AST 24   ALT 21   ANIONGAP 13   ESTGFRAFRICA >60   EGFRNONAA 59*   , CBC   Recent Labs   Lab 10/28/18  2031   WBC 5.33   HGB 13.5*   HCT 39.7*       and Lipid Panel   Lab Results   Component Value Date    CHOL 218 (H) 10/28/2018    HDL 34 (L) 10/28/2018    LDLCALC 143.6 10/28/2018    TRIG 202 (H) 10/28/2018    CHOLHDL 15.6 (L) 10/28/2018       Pending Diagnostic Studies:     Procedure Component Value Units Date/Time     Lower Extremity Veins Bilateral [[id number]]     Order Status:  Sent Lab Status:  No result          Medications:  Reconciled Home Medications:      Medication List      START taking these medications    aspirin 81 MG EC tablet  Commonly known as:  ECOTRIN  Take 1 tablet (81 mg total) by mouth once daily.  Replaces:  aspirin 325 MG tablet     atorvastatin 80 MG tablet  Commonly known as:  LIPITOR  Take 1 tablet (80 mg total) by mouth once daily.     NIFEdipine 30 MG (OSM) 24 hr tablet  Commonly known as:  PROCARDIA-XL  Take 1  "tablet (30 mg total) by mouth once daily.  Start taking on:  10/31/2018        CONTINUE taking these medications    insulin glargine 100 unit/mL (3 mL) Inpn pen  Commonly known as:  LANTUS SOLOSTAR  Inject 16 Units into the skin every evening.     lisinopril 40 MG tablet  Commonly known as:  PRINIVIL,ZESTRIL  Take 1 tablet (40 mg total) by mouth once daily.     metFORMIN 500 MG tablet  Commonly known as:  GLUCOPHAGE  Take 1 tablet (500 mg total) by mouth 2 (two) times daily with meals.     metoprolol tartrate 100 MG tablet  Commonly known as:  LOPRESSOR  Take 1 tablet (100 mg total) by mouth 2 (two) times daily.     pen needle, diabetic 31 gauge x 5/16" Ndle        STOP taking these medications    amitriptyline 25 MG tablet  Commonly known as:  ELAVIL     aspirin 325 MG tablet  Replaced by:  aspirin 81 MG EC tablet     felodipine 10 MG 24 hr tablet  Commonly known as:  PLENDIL            Indwelling Lines/Drains at time of discharge:   Lines/Drains/Airways          None          Time spent on the discharge of patient: 35 minutes  Patient was seen and examined on the date of discharge and determined to be suitable for discharge.         Chris Gr MD  Department of Hospital Medicine  [facility]  "

## 2018-10-30 NOTE — PT/OT/SLP PROGRESS
Physical Therapy Treatment    Patient Name:  Steven Shields   MRN:  [id number]    Recommendations:     Discharge Recommendations:  outpatient speech therapy, outpatient PT   Discharge Equipment Recommendations: cane, straight   Barriers to discharge: None    Assessment:     Steven Shields is a 58 y.o. male admitted with a medical diagnosis of CVA (cerebral vascular accident).  He presents with the following impairments/functional limitations:  impaired functional mobilty, gait instability, impaired balance, impaired cardiopulmonary response to activity, decreased safety awareness ; pt with good mobility today, NO major LOB noted, though pt with some swaying and occasional scissoring of steps, did slightly better with cane. Pt should benefit from OPPT for high level balance act's.    Rehab Prognosis:  good; patient would benefit from acute skilled PT services to address these deficits and reach maximum level of function.      Recent Surgery: * No surgery found *      Plan:     During this hospitalization, patient to be seen 5 x/week to address the above listed problems via gait training, therapeutic activities, therapeutic exercises  · Plan of Care Expires:  11/28/18   Plan of Care Reviewed with: patient    Subjective     Communicated with nurse prior to session.  Patient found supine in bed upon PT entry to room, agreeable to treatment.      Chief Complaint: none stated  Patient comments/goals: pt reports his speech is improved from when he came into hospital.  Pain/Comfort:  · Pain Rating 1: 0/10  · Pain Rating Post-Intervention 1: 0/10    Patients cultural, spiritual, [employer]ian conflicts given the current situation: none stated    Objective:     Patient found with: peripheral IV     General Precautions: Standard, aspiration, fall, diabetic   Orthopedic Precautions:N/A   Braces: N/A     Functional Mobility:  · Bed Mobility:     · Supine to Sit: modified independence  · Transfers:     · Sit to Stand:  supervision with  no AD  · Gait: amb'd 300' with st cane and SBA, occ cueing for proper use of cane  · Balance: pt worked on side stepping, tandem gt, retro amb., and SLS (~:15-20 sec. ea leg) with CGA      AM-PAC 6 CLICK MOBILITY  Turning over in bed (including adjusting bedclothes, sheets and blankets)?: 4  Sitting down on and standing up from a chair with arms (e.g., wheelchair, bedside commode, etc.): 3  Moving from lying on back to sitting on the side of the bed?: 4  Moving to and from a bed to a chair (including a wheelchair)?: 3  Need to walk in hospital room?: 3  Climbing 3-5 steps with a railing?: 3  Basic Mobility Total Score: 20       Therapeutic Activities and Exercises:   pt perf'd seated LAQ's, mini-squats x 10 ea., as well as balance act's noted above.  BP:155/74 seated    Patient left up in chair with all lines intact, call button in reach and nurse notified..    GOALS:   Multidisciplinary Problems     Physical Therapy Goals        Problem: Physical Therapy Goal    Goal Priority Disciplines Outcome Goal Variances Interventions   Physical Therapy Goal     PT, PT/OT Ongoing (interventions implemented as appropriate)     Description:  Goals to be met by: 10/5/18    Patient will increase functional independence with mobility by performin. Sit<>stand transfer with Mod I, no AD.   2. Gait > 150 feet with SBA using SPC or no AD, minimal to no deviation of path.   3. Tolerate standing with eyes open on uneven surface > 20 seconds with SBA  4. Tolerate standing with eyes closed on uneven surface > 20 seconds with SBA                      Time Tracking:     PT Received On: 10/30/18  PT Start Time: 915     PT Stop Time: 940  PT Total Time (min): 25 min     Billable Minutes: Gait Training 13 and Therapeutic Exercise 12    Treatment Type: Treatment  PT/PTA: PTA     PTA Visit Number: 1     Trinity Durand PTA  10/30/2018

## 2018-10-30 NOTE — PT/OT/SLP PROGRESS
Speech Language Pathology Treatment    Patient Name:  Steven Shields   MRN:  [id number]  Admitting Diagnosis: CVA (cerebral vascular accident)    Recommendations:                 General Recommendations:               1. Speech therapy to follow up 3-5x per week for ongoing assessment and remediation of motor speech deficits and to monitor diet tolerance      Diet recommendations:  Regular, Thin      Aspiration Precautions: 1 bite/sip at a time, Avoid talking while eating, Feed only when awake/alert, Frequent oral care, HOB to 90 degrees, Remain upright 30 minutes post meal, Small bites/sips and Standard aspiration precautions, Medications 1 at a time     General Precautions: Standard, aspiration     Subjective     Pt awake, finishing breakfast upon entry to room. Reports overall improvement of speech since yesterday. Pt notes dcr slurring today.     Pain/Comfort:  · Pain Rating 1: 0/10    Objective:     Has the patient been evaluated by SLP for swallowing?   Yes  Keep patient NPO? No   Current Respiratory Status: room air      Cognitive Communication: Pt awake, alert, pleasant, agreeable to session. Oriented to name, , place, and reason for hospitalization with 100% acc. Able to sustain attention for 20 minutes without redirection. Mildly dcr eye contact noted during conversation. Able to participate in higher level conversation this date regarding PMH, current status, and social history. Mildly dcr recall of events pertaining to PMH; however, pt denies any difficulty with memory. Unable to elaborate on prior deficits, provides simple yet sufficient explanations of prior deficits. Pt reports no residuals deficits following  CVA. Able to answer simple to complex yes/no questions with 100% acc. Pt is able to follow multistep commands with 100% acc. Pt able to name 18 items in a category in 60 sec (norm=18-20). Pt is able to convey wants and needs in fluent and organized sentences.     Oral Motor/Motor Speech:  "Speech is % intelligible at the conversational level with few instances of slurring or articulatory imprecision. Face is symmetrical at rest and during smile. Pt reports that L side of face "still feels weaker." Dcr bilateral labial retraction during smile; however, improved as compared to previous session. Tongue is able to protrude at midline, retract, lateralize, and elevate WFL. Lip seal and mandibular strength appear WFL. Pt able to count to 20 on 1x inhale. Pt able to sustain phonation for 5.05 sec and 5.07 sec (norm= >15). Dcr articulatory imprecision noted today. Pt able to imitate 1-4 syllable words with 100% acc. Pt notes dcr acc and increased effort in production of /K/ in all positions; however, improvement noted since previous session. Mild evidence of motor speech deficit persists, c/b mild articulatory imprecision with increased syllable complexity, mild dcr rate of speech, and dcr AMRs. Pt denies concern with dcr breath support for speech. Reportedly drooling has resolved.     Swallowing: Pt was able to consume a regular diet with mixed consistencies and thin liquids without overt s/s of aspiration or airway threat without assistance: no coughing, throat clearing, vocal quality change observed or reported. Pt was administered AM medications by RN. Pt demonstrated coughing after quickly consuming several pills at once followed by liquid chaser. Pt stated "It went down the wrong pipe," but denies any similar occurrences since this admission. SLP discussed taking pills 1 at a time and dcr rate of feeding/swallowing secondary to possible lingual weakness and dcr coordination. Pt agreeable and stated verbal understanding. No other evidence of airway threat or coughing observed.     Assessment:     Steven Shields is a 58 y.o. male with an SLP diagnosis of Dysarthria.  He presents with mild motor speech deficits c/b imprecise articulation, dcr conversational intelligibility, dcr rate of speech, and " slurring. Pt demonstrating improvement in articulatory precision, as compared to initial session. Recommending outpatient speech therapy upon d/c.     Goals:   Multidisciplinary Problems     SLP Goals        Problem: SLP Goal    Goal Priority Disciplines Outcome   SLP Goal     SLP Ongoing (interventions implemented as appropriate)   Description:  1. Pt will be able to consume a regular diet with thin liquids without overt s/s of aspiration or airway threat given no assistance.   2. Pt will be able to increase intelligibility to 100% at the conversational level given min cues to dcr rate of speech and  increase articulatory precision.   GOALS ADDED:  3. Pt will be able to produce sustained phonation for a minimum of 10 seconds given min cues.                     Plan:     · Patient to be seen:  3 x/week, 5 x/week   · Plan of Care expires:  11/09/18  · Plan of Care reviewed with:  patient, other (see comments)(RN)   · SLP Follow-Up:  Yes       Discharge recommendations:  outpatient speech therapy       Time Tracking:     SLP Treatment Date:   10/30/18  Speech Start Time:  0845  Speech Stop Time:  0905     Speech Total Time (min):  20 min    Billable Minutes: Speech Therapy Individual 20 minutes    Zeferino Rudolph CCC-SLP  10/30/2018

## 2018-10-30 NOTE — DISCHARGE INSTRUCTIONS
Thank you for choosing [facility] as your Health Care Provider. [facility] strives to provide the best healthcare available to you. In the next few days you may receive a Survey, either by mail or email,  asking you to rate our care that was provided to you during your stay.  Please return the survey to us, as your feedback is important. We aim to meet your expectations of safe, quality health care.    From your [facility] Team.        Stroke (Completed)    You have had a mild stroke, or cerebrovascular accident (CVA). This is caused by a loss of blood flow to part of your brain. This can occur when a blood clot forms inside the carotid artery (main artery from the heart to the brain) or inside the heart. When the clot travels to the brain, it can lodge in a blood vessel and block blood flow. The other common cause of stroke is a gradual narrowing of the arteries in the brain due to buildup of fatty deposits (plaque).  Symptoms  Blocked blood flow in different areas of the brain can cause different symptoms. If you have had a stroke before, a new one may be different. A memory aid for the basic signs of a stroke is F.A.S.T.  F.A.S.T.  · F: Face drooping, or numbness on one side. This may be more noticeable when you ask the affected person to smile.  · A: Arm weakness or numbness. The affected person may have trouble using or lifting one side.  · S: Speech difficulty. Speech may be slurred or hard to understand. The affected person may also use the wrong words.  · T: Time to call 911. Time is critical in treating a stroke. Call 911 as soon as you suspect a stroke has happened--even a small one. The sooner treatment is started the better, even if the symptoms go away.  Other common symptoms of a stroke include:  · Having difficulty getting the right words to come out  · Weakness in one leg  · Numbness on one side  · Difficulty walking  · Trouble with coordination  · Trouble with  vision  · Headache  · Confusion  · Dizziness  Treatment  After you have had a stroke, you are at risk of having another. Be sure to follow up with your healthcare provider for further evaluation and treatment. If problems are found, your healthcare provider will recommend treatment with medicines and/or procedures.  To reduce your chance of having another stroke, you may be prescribed medicines. These include medicines to prevent blood clots, such as antiplatelet or anticoagulant medicines.  Home care  · Rest at home and avoid exertion for the next few days.  · If your healthcare provider has prescribed medicines, take them as directed.  Follow-up care  Follow up with your healthcare provider, or as advised. Additional tests may be needed. If you had an X-ray, CT scan, MRI, or ECG (electrocardiogram), it will be reviewed by a specialist. You will be notified of any new findings that will affect your care.  Call 911  Contact emergency services right away if any of these occur:  · Any of your stroke symptoms worsen  · New problems with speech, confusion, vision, walking, coordination, facial droop, or weakness or numbness on one side of your body  · Severe headache, fainting spell, dizziness, or seizure  · Chest pain or shortness of breath  Remember F.A.S.T. (described above). If you notice warning signs and symptoms of stroke, CALL 911 without delay.  Date Last Reviewed: 9/21/2015  © 3648-1723 [employer]. [address] [place] [address], [place] [address]. All rights reserved. This information is not intended as a substitute for professional medical care. Always follow your healthcare professional's instructions.

## 2018-10-30 NOTE — PT/OT/SLP PROGRESS
Occupational Therapy      Patient Name:  Steven Shields   MRN:  [id number]    Patient not seen today secondary to Unavailable (Comment)(Hospital discharge). Discharge summary to follow.    GUILLE Begum  10/30/2018

## 2018-10-30 NOTE — HOSPITAL COURSE
Mr. Shields is a 58 year old man with a history of hypertension who was admitted for evaluation of slurred speech and was diagnosed with acute ischemic stroke in the pontine perforators, involving the belly of the fabiola.  He was treated with permissive hypertension, euthermia and euglycemia.  Echocardiogram was obtained and showed no valvular lesions, no wall motion abnormalities and a normal ejection fraction.  MRI of neck was obtained which showed no evidence of significant carotid stenosis.  He was monitored on telemetry without any evidence of arrhythmia.  Most likely this was due to uncontrolled hypertension.  His blood pressure was controlled.  Lipid panel was obtained and he was started on a high intensity statin.  He was previously on aspirin 325mg daily at home but Dr. Severino has recommended aspirin 81mg po daily at discharge.  He will resume his home metformin and insulin at the time of discharge.  He was seen by PT, OT, and SLP.  His symptoms of slurred speech have nearly resolved.  He will be discharged and will continue with outpatient speech therapy.  He is instructed to follow up with his pcp within 2 weeks and with neurology, Dr. Severino, within 1 month.

## 2018-10-30 NOTE — PLAN OF CARE
Problem: Physical Therapy Goal  Goal: Physical Therapy Goal  Goals to be met by: 10/5/18    Patient will increase functional independence with mobility by performin. Sit<>stand transfer with Mod I, no AD.   2. Gait > 150 feet with SBA using SPC or no AD, minimal to no deviation of path.   3. Tolerate standing with eyes open on uneven surface > 20 seconds with SBA  4. Tolerate standing with eyes closed on uneven surface > 20 seconds with SBA     Pt progressing well towards goals, amb'd ~300' using st cane and SBA (occasional swaying noted, though NO major LOB). Recommend cont PT in OPPT for higher level balance act's.

## 2018-10-30 NOTE — NURSING
Discharge instructions given, questions answered, pt acknowledged understanding. IV removed with catheter intact. Pt sent home with belongings and doctors note to return to work. Stated he would come back for prescriptions later, stated he does this all the time. Refused transport, pt walked out with own power.    Yes

## 2018-10-31 ENCOUNTER — TELEPHONE (OUTPATIENT)
Dept: PRIMARY CARE CLINIC | Facility: CLINIC | Age: 58
End: 2018-10-31

## 2018-10-31 NOTE — PT/OT/SLP DISCHARGE
Physical Therapy Discharge Summary    Name: Steven Shields  MRN: [id number]   Principal Problem: CVA (cerebral vascular accident)     Patient Discharged from acute Physical Therapy on 10/30/18.  Please refer to prior PT noted date on 10/30/18 for functional status.     Assessment:     Patient has not met goals.    Objective:     GOALS:   Multidisciplinary Problems     Physical Therapy Goals        Problem: Physical Therapy Goal    Goal Priority Disciplines Outcome Goal Variances Interventions   Physical Therapy Goal     PT, PT/OT Ongoing (interventions implemented as appropriate)     Description:  Goals to be met by: 10/5/18    Patient will increase functional independence with mobility by performin. Sit<>stand transfer with Mod I, no AD.   2. Gait > 150 feet with SBA using SPC or no AD, minimal to no deviation of path.   3. Tolerate standing with eyes open on uneven surface > 20 seconds with SBA  4. Tolerate standing with eyes closed on uneven surface > 20 seconds with SBA                      Reasons for Discontinuation of Therapy Services  Transfer to alternate level of care.      Plan:     Patient Discharged to: Outpatient Therapy Services. PT of record not available for documentation.      Karis Castaneda, PT  10/31/2018
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
## 2018-11-12 ENCOUNTER — LAB VISIT (OUTPATIENT)
Dept: LAB | Facility: HOSPITAL | Age: 58
End: 2018-11-12
Attending: INTERNAL MEDICINE
Payer: COMMERCIAL

## 2018-11-12 ENCOUNTER — OFFICE VISIT (OUTPATIENT)
Dept: PRIMARY CARE CLINIC | Facility: CLINIC | Age: 58
End: 2018-11-12
Payer: COMMERCIAL

## 2018-11-12 VITALS
SYSTOLIC BLOOD PRESSURE: 138 MMHG | BODY MASS INDEX: 37.26 KG/M2 | TEMPERATURE: 99 F | OXYGEN SATURATION: 97 % | HEIGHT: 69 IN | DIASTOLIC BLOOD PRESSURE: 80 MMHG | WEIGHT: 251.56 LBS | HEART RATE: 76 BPM

## 2018-11-12 DIAGNOSIS — Z23 NEED FOR PNEUMOCOCCAL VACCINATION: ICD-10-CM

## 2018-11-12 DIAGNOSIS — E11.42 TYPE 2 DIABETES MELLITUS WITH DIABETIC POLYNEUROPATHY: ICD-10-CM

## 2018-11-12 DIAGNOSIS — Z23 NEED FOR INFLUENZA VACCINATION: ICD-10-CM

## 2018-11-12 DIAGNOSIS — Z79.4 TYPE 2 DIABETES MELLITUS WITH DIABETIC POLYNEUROPATHY, WITH LONG-TERM CURRENT USE OF INSULIN: ICD-10-CM

## 2018-11-12 DIAGNOSIS — E11.42 TYPE 2 DIABETES MELLITUS WITH DIABETIC POLYNEUROPATHY, WITH LONG-TERM CURRENT USE OF INSULIN: ICD-10-CM

## 2018-11-12 DIAGNOSIS — Z11.59 NEED FOR HEPATITIS C SCREENING TEST: ICD-10-CM

## 2018-11-12 DIAGNOSIS — D50.9 MICROCYTIC ANEMIA: Primary | ICD-10-CM

## 2018-11-12 DIAGNOSIS — I63.019 CEREBROVASCULAR ACCIDENT (CVA) DUE TO THROMBOSIS OF VERTEBRAL ARTERY, UNSPECIFIED BLOOD VESSEL LATERALITY: ICD-10-CM

## 2018-11-12 DIAGNOSIS — I10 ESSENTIAL HYPERTENSION: ICD-10-CM

## 2018-11-12 LAB
ESTIMATED AVG GLUCOSE: 143 MG/DL
HBA1C MFR BLD HPLC: 6.6 %

## 2018-11-12 PROCEDURE — 83036 HEMOGLOBIN GLYCOSYLATED A1C: CPT

## 2018-11-12 PROCEDURE — 3075F SYST BP GE 130 - 139MM HG: CPT | Mod: CPTII,S$GLB,, | Performed by: INTERNAL MEDICINE

## 2018-11-12 PROCEDURE — 36415 COLL VENOUS BLD VENIPUNCTURE: CPT | Mod: PN

## 2018-11-12 PROCEDURE — 86803 HEPATITIS C AB TEST: CPT

## 2018-11-12 PROCEDURE — 3008F BODY MASS INDEX DOCD: CPT | Mod: CPTII,S$GLB,, | Performed by: INTERNAL MEDICINE

## 2018-11-12 PROCEDURE — 3079F DIAST BP 80-89 MM HG: CPT | Mod: CPTII,S$GLB,, | Performed by: INTERNAL MEDICINE

## 2018-11-12 PROCEDURE — 3044F HG A1C LEVEL LT 7.0%: CPT | Mod: CPTII,S$GLB,, | Performed by: INTERNAL MEDICINE

## 2018-11-12 PROCEDURE — 99999 PR PBB SHADOW E&M-EST. PATIENT-LVL IV: CPT | Mod: PBBFAC,,, | Performed by: INTERNAL MEDICINE

## 2018-11-12 PROCEDURE — 99214 OFFICE O/P EST MOD 30 MIN: CPT | Mod: S$GLB,,, | Performed by: INTERNAL MEDICINE

## 2018-11-12 NOTE — PATIENT INSTRUCTIONS
Your blood pressure is borderline high today.  Please monitor at home with a digital blood pressure cuff when sitting and rested for at least 15 minutes or longer.  Target blood pressure is less than 130/80.      I will order routine labs today to work up your anemia further, as well as your diabetic labs.    We will give you the Flu Vaccine today.    Return in 1 week - sooner if needed.  Please come at least 15-20 minutes before your scheduled appointment time.  Bring all of your medications with you at your next appointment.

## 2018-11-13 LAB — HCV AB SERPL QL IA: NEGATIVE

## 2018-11-15 ENCOUNTER — OFFICE VISIT (OUTPATIENT)
Dept: UROLOGY | Facility: CLINIC | Age: 58
End: 2018-11-15
Payer: COMMERCIAL

## 2018-11-15 VITALS
SYSTOLIC BLOOD PRESSURE: 154 MMHG | HEART RATE: 75 BPM | DIASTOLIC BLOOD PRESSURE: 94 MMHG | HEIGHT: 69 IN | WEIGHT: 251.31 LBS | BODY MASS INDEX: 37.22 KG/M2

## 2018-11-15 DIAGNOSIS — E78.5 HYPERLIPIDEMIA, UNSPECIFIED HYPERLIPIDEMIA TYPE: ICD-10-CM

## 2018-11-15 DIAGNOSIS — N52.01 ERECTILE DYSFUNCTION DUE TO ARTERIAL INSUFFICIENCY: Primary | ICD-10-CM

## 2018-11-15 DIAGNOSIS — I10 ESSENTIAL HYPERTENSION: ICD-10-CM

## 2018-11-15 DIAGNOSIS — E11.69 TYPE 2 DIABETES MELLITUS WITH OTHER SPECIFIED COMPLICATION, WITHOUT LONG-TERM CURRENT USE OF INSULIN: ICD-10-CM

## 2018-11-15 DIAGNOSIS — N40.1 BPH WITH URINARY OBSTRUCTION: ICD-10-CM

## 2018-11-15 DIAGNOSIS — N13.8 BPH WITH URINARY OBSTRUCTION: ICD-10-CM

## 2018-11-15 PROCEDURE — 99204 OFFICE O/P NEW MOD 45 MIN: CPT | Mod: S$GLB,,, | Performed by: UROLOGY

## 2018-11-15 PROCEDURE — 3008F BODY MASS INDEX DOCD: CPT | Mod: CPTII,S$GLB,, | Performed by: UROLOGY

## 2018-11-15 PROCEDURE — 99999 PR PBB SHADOW E&M-EST. PATIENT-LVL III: CPT | Mod: PBBFAC,,, | Performed by: UROLOGY

## 2018-11-15 PROCEDURE — 3077F SYST BP >= 140 MM HG: CPT | Mod: CPTII,S$GLB,, | Performed by: UROLOGY

## 2018-11-15 PROCEDURE — 3044F HG A1C LEVEL LT 7.0%: CPT | Mod: CPTII,S$GLB,, | Performed by: UROLOGY

## 2018-11-15 PROCEDURE — 3080F DIAST BP >= 90 MM HG: CPT | Mod: CPTII,S$GLB,, | Performed by: UROLOGY

## 2018-11-15 RX ORDER — SILDENAFIL 100 MG/1
100 TABLET, FILM COATED ORAL DAILY PRN
Qty: 10 TABLET | Refills: 11 | Status: SHIPPED | OUTPATIENT
Start: 2018-11-15 | End: 2019-05-22 | Stop reason: SDUPTHER

## 2018-11-15 NOTE — PROGRESS NOTES
CHIEF COMPLAINT:    Mr. Shields is a 58 y.o. male presenting with ED.    PRESENTING ILLNESS:    Steven Shields is a 58 y.o. male who c/o ED.  This has been present for > 1 year.  He has not tried a PDE-5i. He hasn't had a T checked.    He has + frequency + urgency + decreased FOS.  He'd like to try an alpha blocker.    REVIEW OF SYSTEMS:    Steven Shields denies headache, blurred vision, fever, nausea, vomiting, chills, abdominal pain, bleeding per rectum, cough, SOB, recent loss of consciousness, recent mental status changes, seizures, dizziness, or upper or lower extremity weakness.    ADDY  1. 1  2. 2  3. 2  4. 3  5. 3      PATIENT HISTORY:    Past Medical History:   Diagnosis Date    Diabetes mellitus type II     Hyperlipidemia     Hypertension     Stroke     2011, denies deficits        Past Surgical History:   Procedure Laterality Date    ANKLE FRACTURE SURGERY      HAND SURGERY Left     NERVE GRAFT         Family History   Problem Relation Age of Onset    Heart disease Mother     Heart disease Father        Social History     Socioeconomic History    Marital status:      Spouse name: Not on file    Number of children: Not on file    Years of education: Not on file    Highest education level: Not on file   Social Needs    Financial resource strain: Not on file    Food insecurity - worry: Not on file    Food insecurity - inability: Not on file    Transportation needs - medical: Not on file    Transportation needs - non-medical: Not on file   Occupational History    Not on file   Tobacco Use    Smoking status: Former Smoker     Packs/day: 1.00     Years: 15.00     Pack years: 15.00     Last attempt to quit: 1990     Years since quittin.8    Smokeless tobacco: Never Used   Substance and Sexual Activity    Alcohol use: No     Frequency: Never    Drug use: No    Sexual activity: Yes     Partners: Female   Other Topics Concern    Not on file   Social History Narrative     "Not on file       Allergies:  Pneumococcal 23-timothy ps vaccine    Medications:    Current Outpatient Medications:     aspirin (ECOTRIN) 81 MG EC tablet, Take 1 tablet (81 mg total) by mouth once daily., Disp: 60 tablet, Rfl: 1    atorvastatin (LIPITOR) 80 MG tablet, Take 1 tablet (80 mg total) by mouth once daily., Disp: 30 tablet, Rfl: 1    insulin glargine (LANTUS SOLOSTAR) 100 unit/mL (3 mL) InPn pen, Inject 16 Units into the skin every evening., Disp: 15 mL, Rfl: 2    lisinopril (PRINIVIL,ZESTRIL) 40 MG tablet, Take 1 tablet (40 mg total) by mouth once daily., Disp: 30 tablet, Rfl: 1    metFORMIN (GLUCOPHAGE) 500 MG tablet, Take 1 tablet (500 mg total) by mouth 2 (two) times daily with meals., Disp: 60 tablet, Rfl: 1    metoprolol tartrate (LOPRESSOR) 100 MG tablet, Take 1 tablet (100 mg total) by mouth 2 (two) times daily., Disp: 60 tablet, Rfl: 1    NIFEdipine (PROCARDIA-XL) 30 MG (OSM) 24 hr tablet, Take 1 tablet (30 mg total) by mouth once daily., Disp: 30 tablet, Rfl: 1    pen needle, diabetic 31 gauge x 5/16" Ndle, , Disp: , Rfl:     PHYSICAL EXAMINATION:    The patient generally appears in good health, is appropriately interactive, and is in no apparent distress.     Eyes: anicteric sclerae, moist conjunctivae; no lid-lag; PERRLA     HENT: Atraumatic; oropharynx clear with moist mucous membranes and no mucosal ulcerations;normal hard and soft palate.  No evidence of lymphadenopathy.    Neck: Trachea midline.  No thyromegaly.    Musculoskeletal: No abnormal gait.    Skin: No lesions.    Mental: Cooperative with normal affect.  Is oriented to time, place, and person.    Neuro: Grossly intact.    Chest: Normal inspiratory effort.   No accessory muscles.  No audible wheezes.  Respirations symmetric on inspiration and expiration.    Heart: Regular rhythm.      Abdomen:  Soft, non-tender. No masses or organomegaly. Bladder is not palpable. No evidence of flank discomfort. No evidence of inguinal " hernia.    Genitourinary: The penis has no evidence of plaques or induration. The urethral meatus is normal. The testes, epididymides, and cord structures are normal in size and contour bilaterally. The scrotum is normal in size and contour.    Normal anal sphincter tone. No rectal mass.    The prostate is smooth. Normal landmarks. Lateral sulci. Median furrow intact.  No nodularity or induration. Seminal vesicles are normal.    Extremities: No clubbing, cyanosis, or edema      LABS:      Lab Results   Component Value Date    PSA 0.67 03/16/2012    PSA 0.57 06/01/2010       IMPRESSION:    Encounter Diagnoses   Name Primary?    Erectile dysfunction due to arterial insufficiency Yes    BPH with urinary obstruction     Type 2 diabetes mellitus with other specified complication, without long-term current use of insulin     Essential hypertension     Hyperlipidemia, unspecified hyperlipidemia type    DM, controlled  HTN, controlled  Hyperlipidemia, controlled      PLAN:    1. Will draw a PSA as he is past due.  2. Will check a T for the ED as this hasn't been done.  3. Discussed options for his ED.  He'd like Viagra. Side effects discussed.  A new Rx was given  4. Will try flomax. Side effects discussed.  A new Rx was given  5. RTC 3 months to see an OLIVIA    Copy to:

## 2018-11-16 ENCOUNTER — TELEPHONE (OUTPATIENT)
Dept: UROLOGY | Facility: CLINIC | Age: 58
End: 2018-11-16

## 2018-11-16 DIAGNOSIS — N52.01 ERECTILE DYSFUNCTION DUE TO ARTERIAL INSUFFICIENCY: Primary | ICD-10-CM

## 2018-11-16 NOTE — TELEPHONE ENCOUNTER
I spoke with patient who agreed to lab appt date time and location/ per /Rasta's note..Please notify T is low.  It should be repeated between 7-10 am with a prolactin.

## 2018-11-23 ENCOUNTER — LAB VISIT (OUTPATIENT)
Dept: LAB | Facility: OTHER | Age: 58
End: 2018-11-23
Attending: UROLOGY
Payer: COMMERCIAL

## 2018-11-23 DIAGNOSIS — N52.01 ERECTILE DYSFUNCTION DUE TO ARTERIAL INSUFFICIENCY: ICD-10-CM

## 2018-11-23 LAB
PROLACTIN SERPL IA-MCNC: 39.5 NG/ML
TESTOST SERPL-MCNC: 333 NG/DL

## 2018-11-23 PROCEDURE — 84403 ASSAY OF TOTAL TESTOSTERONE: CPT

## 2018-11-23 PROCEDURE — 84146 ASSAY OF PROLACTIN: CPT

## 2018-11-23 PROCEDURE — 36415 COLL VENOUS BLD VENIPUNCTURE: CPT

## 2018-11-28 ENCOUNTER — TELEPHONE (OUTPATIENT)
Dept: UROLOGY | Facility: CLINIC | Age: 58
End: 2018-11-28

## 2018-11-28 DIAGNOSIS — R79.89 ELEVATED PROLACTIN LEVEL: Primary | ICD-10-CM

## 2018-11-28 NOTE — TELEPHONE ENCOUNTER
Pt notified of results, and need for for endocrinology consult. Pt verbalized understanding. Appt notice mailed.

## 2018-11-28 NOTE — TELEPHONE ENCOUNTER
Please notify that his T is normal, but his prolactin is high.  He needs to see endocrine.    RTC here as scheduled.

## 2018-12-06 ENCOUNTER — TELEPHONE (OUTPATIENT)
Dept: PRIMARY CARE CLINIC | Facility: CLINIC | Age: 58
End: 2018-12-06

## 2018-12-06 NOTE — TELEPHONE ENCOUNTER
Tried to call Carol back, but no luck in getting in touch. Wrong number was documented.    ----- Message from Lucia Aponte sent at 12/6/2018 10:47 AM CST -----  Contact: Carol            Name of Who is Calling: JONNY TODD [[id number]]      What is the request in detail: pt had a stroke and was in hospital.. Please advise      Can the clinic reply by [facility]:no      What [facility] to [facility] Back [facility]: [phone] [facility], [place] [address]

## 2018-12-28 ENCOUNTER — NURSE TRIAGE (OUTPATIENT)
Dept: ADMINISTRATIVE | Facility: CLINIC | Age: 58
End: 2018-12-28

## 2018-12-28 RX ORDER — ATORVASTATIN CALCIUM 80 MG/1
80 TABLET, FILM COATED ORAL DAILY
Qty: 30 TABLET | Refills: 1 | Status: SHIPPED | OUTPATIENT
Start: 2018-12-28 | End: 2019-03-08

## 2018-12-28 RX ORDER — NIFEDIPINE 30 MG/1
30 TABLET, EXTENDED RELEASE ORAL DAILY
Qty: 30 TABLET | Refills: 1 | Status: SHIPPED | OUTPATIENT
Start: 2018-12-28 | End: 2019-05-22

## 2018-12-28 NOTE — TELEPHONE ENCOUNTER
Reason for Disposition   Information only question and nurse able to answer    Protocols used: ST NO PROTOCOL CALL: INFORMATION ONLY-A-[place]    Mr. Shields is calling to follow up on refills for Lipitor and Procardia. Patient informed that medications have been refilled to [facility] ([facility]).

## 2019-01-28 RX ORDER — LISINOPRIL 40 MG/1
TABLET ORAL
Qty: 30 TABLET | Refills: 0 | Status: SHIPPED | OUTPATIENT
Start: 2019-01-28 | End: 2019-03-01 | Stop reason: SDUPTHER

## 2019-01-28 RX ORDER — METOPROLOL TARTRATE 100 MG/1
TABLET ORAL
Qty: 60 TABLET | Refills: 0 | Status: SHIPPED | OUTPATIENT
Start: 2019-01-28 | End: 2019-03-08 | Stop reason: SDUPTHER

## 2019-01-28 NOTE — TELEPHONE ENCOUNTER
lmovm    ----- Message from Julian Carson MD sent at 1/28/2019  2:36 PM CST -----  Regarding: Appointment  Patient missed his follow up appointment.  Please contact him to reschedule.  Thanks.

## 2019-03-02 RX ORDER — LISINOPRIL 40 MG/1
TABLET ORAL
Qty: 30 TABLET | Refills: 0 | Status: SHIPPED | OUTPATIENT
Start: 2019-03-02 | End: 2019-05-24 | Stop reason: SDUPTHER

## 2019-03-04 ENCOUNTER — TELEPHONE (OUTPATIENT)
Dept: INTERNAL MEDICINE | Facility: CLINIC | Age: 59
End: 2019-03-04

## 2019-03-06 ENCOUNTER — TELEPHONE (OUTPATIENT)
Dept: PRIMARY CARE CLINIC | Facility: CLINIC | Age: 59
End: 2019-03-06

## 2019-03-06 NOTE — TELEPHONE ENCOUNTER
----- Message from Julian Carson MD sent at 3/2/2019  7:11 AM CST -----  Regarding: Appointment  Patient needs to schedule an appointment in order to continue to get medication refills.  thanks

## 2019-03-08 RX ORDER — METFORMIN HYDROCHLORIDE 500 MG/1
TABLET ORAL
Qty: 60 TABLET | Refills: 0 | Status: SHIPPED | OUTPATIENT
Start: 2019-03-08 | End: 2019-06-29 | Stop reason: SDUPTHER

## 2019-03-08 RX ORDER — METOPROLOL TARTRATE 100 MG/1
TABLET ORAL
Qty: 60 TABLET | Refills: 0 | Status: SHIPPED | OUTPATIENT
Start: 2019-03-08 | End: 2019-06-29 | Stop reason: SDUPTHER

## 2019-03-08 RX ORDER — ATORVASTATIN CALCIUM 80 MG/1
80 TABLET, FILM COATED ORAL DAILY
Qty: 30 TABLET | Refills: 1 | Status: SHIPPED | OUTPATIENT
Start: 2019-03-08 | End: 2019-06-29 | Stop reason: SDUPTHER

## 2019-05-05 RX ORDER — LISINOPRIL 40 MG/1
TABLET ORAL
Qty: 30 TABLET | Refills: 0 | OUTPATIENT
Start: 2019-05-05

## 2019-05-22 ENCOUNTER — OFFICE VISIT (OUTPATIENT)
Dept: UROLOGY | Facility: CLINIC | Age: 59
End: 2019-05-22
Payer: COMMERCIAL

## 2019-05-22 ENCOUNTER — TELEPHONE (OUTPATIENT)
Dept: PRIMARY CARE CLINIC | Facility: CLINIC | Age: 59
End: 2019-05-22

## 2019-05-22 VITALS
BODY MASS INDEX: 35.98 KG/M2 | SYSTOLIC BLOOD PRESSURE: 198 MMHG | WEIGHT: 242.94 LBS | DIASTOLIC BLOOD PRESSURE: 105 MMHG | HEIGHT: 69 IN | HEART RATE: 87 BPM

## 2019-05-22 DIAGNOSIS — R36.1 HEMATOSPERMIA: ICD-10-CM

## 2019-05-22 DIAGNOSIS — R03.0 ELEVATED BLOOD PRESSURE READING: ICD-10-CM

## 2019-05-22 DIAGNOSIS — N40.1 BPH WITH OBSTRUCTION/LOWER URINARY TRACT SYMPTOMS: ICD-10-CM

## 2019-05-22 DIAGNOSIS — N52.01 ERECTILE DYSFUNCTION DUE TO ARTERIAL INSUFFICIENCY: Primary | ICD-10-CM

## 2019-05-22 DIAGNOSIS — R79.89 ELEVATED PROLACTIN LEVEL: ICD-10-CM

## 2019-05-22 DIAGNOSIS — R35.1 NOCTURIA: ICD-10-CM

## 2019-05-22 DIAGNOSIS — N13.8 BPH WITH OBSTRUCTION/LOWER URINARY TRACT SYMPTOMS: ICD-10-CM

## 2019-05-22 PROCEDURE — 99999 PR PBB SHADOW E&M-EST. PATIENT-LVL III: CPT | Mod: PBBFAC,,, | Performed by: NURSE PRACTITIONER

## 2019-05-22 PROCEDURE — 3077F SYST BP >= 140 MM HG: CPT | Mod: CPTII,S$GLB,, | Performed by: NURSE PRACTITIONER

## 2019-05-22 PROCEDURE — 3077F PR MOST RECENT SYSTOLIC BLOOD PRESSURE >= 140 MM HG: ICD-10-PCS | Mod: CPTII,S$GLB,, | Performed by: NURSE PRACTITIONER

## 2019-05-22 PROCEDURE — 3008F PR BODY MASS INDEX (BMI) DOCUMENTED: ICD-10-PCS | Mod: CPTII,S$GLB,, | Performed by: NURSE PRACTITIONER

## 2019-05-22 PROCEDURE — 99215 PR OFFICE/OUTPT VISIT, EST, LEVL V, 40-54 MIN: ICD-10-PCS | Mod: S$GLB,,, | Performed by: NURSE PRACTITIONER

## 2019-05-22 PROCEDURE — 99999 PR PBB SHADOW E&M-EST. PATIENT-LVL III: ICD-10-PCS | Mod: PBBFAC,,, | Performed by: NURSE PRACTITIONER

## 2019-05-22 PROCEDURE — 99215 OFFICE O/P EST HI 40 MIN: CPT | Mod: S$GLB,,, | Performed by: NURSE PRACTITIONER

## 2019-05-22 PROCEDURE — 3008F BODY MASS INDEX DOCD: CPT | Mod: CPTII,S$GLB,, | Performed by: NURSE PRACTITIONER

## 2019-05-22 PROCEDURE — 3080F DIAST BP >= 90 MM HG: CPT | Mod: CPTII,S$GLB,, | Performed by: NURSE PRACTITIONER

## 2019-05-22 PROCEDURE — 3080F PR MOST RECENT DIASTOLIC BLOOD PRESSURE >= 90 MM HG: ICD-10-PCS | Mod: CPTII,S$GLB,, | Performed by: NURSE PRACTITIONER

## 2019-05-22 RX ORDER — TAMSULOSIN HYDROCHLORIDE 0.4 MG/1
0.4 CAPSULE ORAL DAILY
Qty: 90 CAPSULE | Refills: 3 | Status: ON HOLD | OUTPATIENT
Start: 2019-05-22 | End: 2019-09-06 | Stop reason: ALTCHOICE

## 2019-05-22 RX ORDER — SILDENAFIL 100 MG/1
100 TABLET, FILM COATED ORAL DAILY PRN
Qty: 10 TABLET | Refills: 11 | Status: SHIPPED | OUTPATIENT
Start: 2019-05-22 | End: 2019-05-24

## 2019-05-22 NOTE — PATIENT INSTRUCTIONS
Understanding Erectile Dysfunction    Erectile dysfunction (ED) is a problem getting an erection firm enough or keeping it long enough for intercourse. The problem can happen to any man at any age. But health problems that can lead to ED become more common as a man ages. Up to half of men over age 40 experience ED at some point.  Causes of ED  ED can have many causes. Most are physical. Some are emotional issues. Often, a combination of causes is involved. Causes of ED may include:  · Medical conditions such as diabetes or depression  · Smoking tobacco or marijuana  · Drinking too much alcohol  · Side effects of medications  · Injury to nerves or blood vessels  · Emotional issues such as stress or relationship problems  ED can be treated  Prescription medications for ED are available. They help many men who try them. Depending upon the cause of the ED, though, medications may not be enough. In these cases, other treatment options are available. These include erectile aids and surgery. Your health care provider can tell you more about the treatment that is right for you. And new treatments for ED are being studied. No matter what the treatment you decide on, stay in touch with your doctor. If your symptoms persist, he or she may be able to adjust your current treatment or try something new.  Date Last Reviewed: 1/1/2017  © 2138-5276 [employer]. [address] [place] [address], [place] [address]. All rights reserved. This information is not intended as a substitute for professional medical care. Always follow your healthcare professional's instructions.        Oral Medicines for Erectile Dysfunction  You can use prescription oral medicine for ED. They often work well. But, like all medicines, they can have side effects. Also, you cant use them if you have certain health problems or take certain other medicines. Talk with your doctor about oral ED medicine. Ask whether it is right for you.  Types of oral ED  medicines  There are three types of prescription oral ED medicines. Each one increases blood flow to the penis. When the penis is stimulated, an erection results. The types are:  · Sildenafil citrate   · Tadalafil   · Vardenafil  · Avanfil  What oral ED medicines dont do  There are some things ED medicines cant do.  · They dont cure the cause of your ED. Without the medicine, youll still have trouble getting an erection.  · They cant produce an erection without sexual stimulation.  · They wont increase sexual desire. They also wont solve any other sexual issues. Psychological, emotional, or relationship issues will not be fixed.  Taking oral ED medicines safely  · Do not combine ED medicines with other treatments unless your doctor tells you to.  · Dont take ED medicines more often or in larger doses than prescribed.  · Tell your doctor your health history. Mention all medicines you take. This includes over-the-counter drugs, supplements, and herbs.  · Ask your doctor about whether you can drink alcohol while taking ED medication.  Possible side effects of oral ED medicines  · Headache  · Facial flushing  · Runny or stuffy nose  · Indigestion  · Distortion of your color vision for a short time  · Sudden vision loss or hearing loss (rare)  · Dizziness  Risks of oral ED medicines  · Do not take ED medicines if you take medicines containing nitrates. The combination may drop your blood pressure to a dangerous level. Nitrates include nitroglycerin (a drug for angina or chest pain). They are also in poppers, an inhaled recreational drug. If youre not sure whether youre taking nitrates, ask your doctor or pharmacist.  · Medicines called alpha-blockers can interact with ED medicines. They can cause a sudden drop in blood pressure. Alpha-blockers are a common treatment for prostate problems. They also treat high blood pressure. Be sure your doctor knows if you take these medicines.  · If youve had a heart  attack or have heart disease and you have not had sex for a while, talk to your doctor. Having sex again can put extra strain on your heart. Your doctor can confirm that your heart is healthy enough for sex.  · It is rare, but some men taking ED medicines have had sudden vision loss. This may be more likely if other health problems are present. These include high blood pressure and diabetes. Ask your doctor if you are at risk for this type of vision loss.  · In rare cases, an erection may last too long. This can badly damage the blood vessels in your penis. If an erection lasts longer than 4 hours, go to the emergency room right away.   Date Last Reviewed: 1/1/2017 © 2000-2017 [employer]. [address] [place] [address], [place] [address]. All rights reserved. This information is not intended as a substitute for professional medical care. Always follow your healthcare professional's instructions.        BPH (Enlarged Prostate)  The prostate is a gland at the base of the bladder. As some men get older, the prostate may get bigger in size. This problem is called benign prostatic hyperplasia (BPH). BPH puts pressure on the urethra. This is the tube that carries urine from the bladder to the penis. It may interfere with the flow of urine. It may also keep the bladder from emptying fully.    Symptoms of BPH include trouble starting urination and feeling as though the bladder isnt emptying all the way. It also includes a weak urine stream, dribbling and leaking of urine, and frequent and urgent urination (especially at night). BPH can increase the risk of urinary infections. It can also block off urine flow completely. If this occurs, a thin tube (catheter) may be passed into the bladder to help drain urine.  If symptoms are mild, no treatment may be needed right now. If symptoms are more severe, treatment is likely needed. The goal of treatment is to improve urine flow and reduce symptoms. Treatments can include  medicine and procedures. Your healthcare provider will discuss treatment options with you as needed.  Home care  The following guidelines will help you care for yourself at home:  · Urinate as soon as you feel the urge. Don't try to hold your urine.  · Don't limit your fluid intake during the day. Drink 6 to 8 glasses of water or liquids a day. This prevents bacteria from building up in the bladder.  · Avoid drinking fluids after dinner to help reduce urination during the night.  · Avoid medicines that can worsen your symptoms. These include certain cold and allergy medicines and antidepressants. Diuretics used for high blood pressure can also worsen symptoms. Talk to your doctor about the medicines you take. Other choices may work better for you.  Prostate cancer screening  BPH does not increase the risk of prostate cancer. But because prostate cancer is a common cancer in men, screening is sometimes recommended. This may help detect the cancer in its early stages when treatment is most effective. Factors that can increase the risk of prostate cancer include being -American or having a father or brother who had prostate cancer. A high-fat diet may also increase the risk of prostate cancer. Talk to your healthcare provider to see whether you should be screened for prostate cancer.  Follow-up care  Follow up with your healthcare provider, or as advised  To learn more, go to:  · National Kidney & Urologic Diseases Information Clearinghouse  kidney.niddk.nih.gov, 458.854.1851  When to seek medical advice  Call your healthcare provider right away if any of these occur:  · Fever of 100.4°F (38.0°C) or higher, or as advised  · Unable to pass urine for 8 hours  · Increasing pressure or pain in your bladder (lower abdomen)  · Blood in the urine  · Increasing low back pain, not related to injury  · Symptoms of urinary infection (increased urge to urinate, burning when passing urine, foul-smelling urine)  Date Last  Reviewed: 7/1/2016  © 4726-6370 [employer]. [address] [place] [address], [place] [address]. All rights reserved. This information is not intended as a substitute for professional medical care. Always follow your healthcare professional's instructions.

## 2019-05-22 NOTE — PROGRESS NOTES
CHIEF COMPLAINT:    Mr. Shields is a 59 y.o. male presenting for hematospermia and ED.    PRESENTING ILLNESS:    Steven Shields is a 59 y.o. male new patient to me (records of past medical, family and social history personally reviewed by me), w/ h/o HLD, HTN, stroke x3, BPH w/ obstruction, DM, and ED.    Last seen in clinic 11/15/18 w/ Dr. Magdaleno.    Today pt returns to clinic reporting discolored semen, dark yellow to green, w/ odor, x1 wk. Reports one episode of hematospermia 2 wks ago. Denies painful ejaculation. Denies new dietary changes. Denies dysuria, GH, f/c, n/v, abd/pelvic/flank pain. Denies urinary frequency. Reports urgency x3-4 months. Reports urge incontence. Nocturia 4-5/night, denies h/o KADIE. Reports moderate FOS. Reports bladder feels empty most of the time. Reports consumes 2-3 16oz pineapple big shots/d; Root beer 2-3 12oz cans; occasional lemonade. Denies use of alcohol. Last follwed up w/ PCP 4 months ago. Reports he has not taken his lisinopril and metform 3 wks ago, as he ran out of medication. Reports will contact PCP today for refills. Denies family h/o bladder/renal/prostate/testicular CA. Reports getting  w/ female partner in 6/2019. Reports ED x1 yr. Reports strong libido. Reports AM/spontaneous erections 1x/wk.  Reports able to attain erections, but not enough for intercourse, ~50% rigidity at best. He was rx'd viagra but never filled prescription.    UA dip in clinic today 500 protein.    PVR in clinic today: 38mL.    REVIEW OF SYSTEMS:    Steven Shields denies headache, blurred vision, fever, nausea, vomiting, chills, abdominal pain, bleeding per rectum, cough, SOB, recent loss of consciousness, recent mental status changes, seizures, dizziness, or upper or lower extremity weakness.    ADDY  1. 2  2. 3  3. 2  4. 3  5. 2    PATIENT HISTORY:    Past Medical History:   Diagnosis Date    Diabetes mellitus type II     Hyperlipidemia     Hypertension     Stroke     Nov 2011,  denies deficits        Past Surgical History:   Procedure Laterality Date    ANKLE FRACTURE SURGERY      HAND SURGERY Left     NERVE GRAFT         Family History   Problem Relation Age of Onset    Heart disease Mother     Heart disease Father        Social History     Socioeconomic History    Marital status:      Spouse name: Not on file    Number of children: Not on file    Years of education: Not on file    Highest education level: Not on file   Occupational History    Not on file   Social Needs    Financial resource strain: Not on file    Food insecurity:     Worry: Not on file     Inability: Not on file    Transportation needs:     Medical: Not on file     Non-medical: Not on file   Tobacco Use    Smoking status: Former Smoker     Packs/day: 1.00     Years: 15.00     Pack years: 15.00     Last attempt to quit: 1990     Years since quittin.4    Smokeless tobacco: Never Used   Substance and Sexual Activity    Alcohol use: No     Frequency: Never    Drug use: No    Sexual activity: Yes     Partners: Female   Lifestyle    Physical activity:     Days per week: Not on file     Minutes per session: Not on file    Stress: Not on file   Relationships    Social connections:     Talks on phone: Not on file     Gets together: Not on file     Attends [employer] service: Not on file     Active member of club or organization: Not on file     Attends meetings of clubs or organizations: Not on file     Relationship status: Not on file   Other Topics Concern    Not on file   Social History Narrative    Not on file     Allergies:  Pneumococcal 23-timothy ps vaccine    Medications:    Current Outpatient Medications:     aspirin (ECOTRIN) 81 MG EC tablet, Take 1 tablet (81 mg total) by mouth once daily., Disp: 60 tablet, Rfl: 1    atorvastatin (LIPITOR) 80 MG tablet, Take 1 tablet (80 mg total) by mouth once daily., Disp: 30 tablet, Rfl: 1    insulin glargine (LANTUS SOLOSTAR) 100 unit/mL (3 mL)  "InPn pen, Inject 16 Units into the skin every evening., Disp: 15 mL, Rfl: 2    lisinopril (PRINIVIL,ZESTRIL) 40 MG tablet, TAKE 1 TABLET BY MOUTH ONCE DAILY, Disp: 30 tablet, Rfl: 0    metFORMIN (GLUCOPHAGE) 500 MG tablet, TAKE 1 TABLET BY MOUTH TWICE DAILY WITH MEALS, Disp: 60 tablet, Rfl: 0    metoprolol tartrate (LOPRESSOR) 100 MG tablet, TAKE 1 TABLET BY MOUTH TWICE DAILY, Disp: 60 tablet, Rfl: 0    pen needle, diabetic 31 gauge x 5/16" Ndle, , Disp: , Rfl:     sildenafil (VIAGRA) 100 MG tablet, Take 1 tablet (100 mg total) by mouth daily as needed for Erectile Dysfunction. Dispense generic, Disp: 10 tablet, Rfl: 11    tamsulosin (FLOMAX) 0.4 mg Cap, Take 1 capsule (0.4 mg total) by mouth once daily., Disp: 90 capsule, Rfl: 3    PHYSICAL EXAMINATION:    The patient generally appears in good health, is appropriately interactive, and is in no apparent distress.     Eyes: anicteric sclerae, moist conjunctivae; no lid-lag; PERRLA     HENT: Atraumatic; oropharynx clear with moist mucous membranes and no mucosal ulcerations;normal hard and soft palate.  No evidence of lymphadenopathy.    Neck: Trachea midline.  No thyromegaly.    Musculoskeletal: No abnormal gait.    Skin: No lesions.    Mental: Cooperative with normal affect.  Is oriented to time, place, and person.    Neuro: Grossly intact.    Chest: Normal inspiratory effort.   No accessory muscles.  No audible wheezes.  Respirations symmetric on inspiration and expiration.    Heart: Regular rhythm.      Abdomen:  Soft, non-tender. No masses or organomegaly. Bladder is not palpable. No evidence of flank discomfort. No evidence of inguinal hernia.    Genitourinary: The penis is circumcised with no evidence of plaques or induration. The urethral meatus is normal. The testes, epididymides, and cord structures are normal in size and contour bilaterally. The scrotum is normal in size and contour.    The prostate is enlarged, smooth, symmetrical, without nodule or " induration. The seminal vesicles were normal and nonpalpable. The prostate was not tender or boggy. Rectal tone was normal no rectal mass was palpable.    Extremities: No clubbing, cyanosis, or edema.    LABS:    Lab Results   Component Value Date    CREATININE 1.3 10/28/2018       Lab Results   Component Value Date    PSA 0.67 03/16/2012    PSA 0.57 06/01/2010    PSADIAG 3.0 11/15/2018     Hemoglobin A1C   Date Value Ref Range Status   11/12/2018 6.6 (H) 4.0 - 5.6 % Final     Comment:     ADA Screening Guidelines:  5.7-6.4%  Consistent with prediabetes  >or=6.5%  Consistent with diabetes  High levels of fetal hemoglobin interfere with the HbA1C  assay. Heterozygous hemoglobin variants (HbS, HgC, etc)do  not significantly interfere with this assay.   However, presence of multiple variants may affect accuracy.     11/04/2011 6.5 (H) 4.0 - 6.2 % Final   06/02/2010 6.4 (H) 4.0 - 6.2 % Final     Testosterone, Total 304 - 1227 ng/dL 272Low     Resulting Agency  [facility]         Specimen Collected: 11/15/18 16:19 Last Resulted: 11/15/18 17:52        Testosterone, Total 304 - 1227 ng/dL 333    Resulting Agency  [facility]         Specimen Collected: 11/23/18 09:51 Last Resulted: 11/23/18 14:56        Prolactin 3.5 - 19.4 ng/mL 39.5High     Resulting Agency  [facility]         Specimen Collected: 11/23/18 09:51 Last Resulted: 11/23/18 15:09        Imaging:    MRI of brain w/ w/o contrast.  Impression       1. Findings compatible with recent infarctions in the distribution of the pontine perforators, involving the belly of the fabiola, without acute hemorrhages or significant mass effects.  2. Supratentorial cerebral white matter changes compatible with chronic microvascular ischemia.  This report was flagged in Epic as abnormal.      Electronically signed by: Siva Pizarro MD  Date: 10/29/2018  Time: 07:59     IMPRESSION:    Encounter Diagnoses   Name Primary?    Erectile dysfunction due to arterial insufficiency Yes    Elevated prolactin  level     Elevated blood pressure reading     Hematospermia     Nocturia     BPH with obstruction/lower urinary tract symptoms      PLAN:    I spent 40 minutes with the patient of which more than half was spent in direct consultation with the patient in regards to our treatment and plan.    Discussed and reviewed condition. Reviewed anatomy and physiology of erectile function. Discussed possible etiologies that may contribute to erectile dysfunction (inability to acquire and/or maintain erectile function) including but not limited to vascular etiology (hypertension, CAD, tobacco use, normal aging), neurologic etiology (diabetes, surgical procedures, stroke), medication adverse effects (anti-depressants), psychological etiology (performance anxiety, depression, low libido, stress), and hormonal etiology (low libido, low testosterone, hypo/hyperthyroidism). Discussed potential treatment options for erectile dysfunction, including the use of PDE-5 inhibitor medications (sildenafil, tadalafil, vardenafil, avanafil), intracavernous self-injection therapy (alprostadil, papaverine, phentolamine), intraurethral suppositories/gels (alprostadil), vacuum-assisted erection devices, and penile prosthesis implantation. Discussed benefits, burdens/harms/risks, and possible adverse effects of each medication, and each method. Discussed the importance of physical activity on most days of the week, and adhering to heart healthy nutrition which may improve and preserve erectile function. Patient is interested in starting trial with Viagra. Advised to avoid use of nitrates while taking PDE-5 inhibitors. Advised to report to local emergency dept for erections lasting for more than 4 hours.    Discussed and reviewed BPH. Reviewed lower urinary anatomy. Discussed prostate normally enlarges to some degree in all men with advancing age, but not all men require treatment. Men with BPH may or may not have symptoms. Urinary symptoms  include irritative (i.e., frequency, urgency) and/or obstructive (i.e., hesitancy, slow stream) symptoms. Treatment for BPH include medications (alpha blockers and/or alpha reductase inhibitors) or surgical intervention (transurethral resection of the prostate/laser ablation/plasma vaporization/transurethral incision of the prostate). Discussed trial w/ tamsulosin. Reviewed proper take, and possible adverse effects (dizziness, retrograde ejaculation, etc.), recommend taking during or 30 minutes after evening meal.    Discussed and reviewed hematospermia, etiologies may include but are not limited to inflammation, and/or trauma. Reassured patient that hematospermia is typically a benign condition without any definite relationship to  malignancy. Discussed it is typically self-limiting, and not dangerous either to him or to his partner. Discussed there is a risk that he may have recurring episodes of blood in his semen. Advised patient to call or return to clinic should this become an ongoing issue.    Discussed and recommend limiting/avoiding fluids, especially caffeine 2-4 hrs prior to bedtime to reduce episodes of nocturia.    Education sheets provided.    F/u 3 months.    Advised to f/u w/ PCP re elevated BP, and Rx refills for HTN and DM.    Patient verbalized and expressed understanding, and agree w/ plan.

## 2019-05-22 NOTE — TELEPHONE ENCOUNTER
----- Message from Keyla Granados sent at 5/22/2019  3:46 PM CDT -----  Contact: JONNY TODD [[id number]]  Can the clinic reply in [facility]:      Please refill the medication(s) listed below. Please call the patient when the prescription(s) is ready for  at the phone number [phone]    Medication #1:lisinopril (PRINIVIL,ZESTRIL) 40 MG tablet    Medication #2:insulin glargine (LANTUS SOLOSTAR) 100 unit/mL (3 mL) InPn pen    Medication #3:NIFEdipine (PROCARDIA-XL) 30 MG (OSM)    Preferred Pharmacy:[facility]/pharmacy #[id number] - [place] - [address]

## 2019-05-24 ENCOUNTER — DOCUMENTATION ONLY (OUTPATIENT)
Dept: PRIMARY CARE CLINIC | Facility: CLINIC | Age: 59
End: 2019-05-24

## 2019-05-24 ENCOUNTER — OFFICE VISIT (OUTPATIENT)
Dept: PRIMARY CARE CLINIC | Facility: CLINIC | Age: 59
End: 2019-05-24
Payer: COMMERCIAL

## 2019-05-24 VITALS
DIASTOLIC BLOOD PRESSURE: 88 MMHG | WEIGHT: 243.63 LBS | HEART RATE: 89 BPM | OXYGEN SATURATION: 99 % | BODY MASS INDEX: 36.08 KG/M2 | HEIGHT: 69 IN | SYSTOLIC BLOOD PRESSURE: 148 MMHG

## 2019-05-24 DIAGNOSIS — I10 ESSENTIAL HYPERTENSION: Primary | ICD-10-CM

## 2019-05-24 DIAGNOSIS — Z23 NEED FOR 23-POLYVALENT PNEUMOCOCCAL POLYSACCHARIDE VACCINE: ICD-10-CM

## 2019-05-24 DIAGNOSIS — D64.9 NORMOCYTIC ANEMIA: ICD-10-CM

## 2019-05-24 DIAGNOSIS — Z79.4 TYPE 2 DIABETES MELLITUS WITH DIABETIC POLYNEUROPATHY, WITH LONG-TERM CURRENT USE OF INSULIN: ICD-10-CM

## 2019-05-24 DIAGNOSIS — N52.01 ERECTILE DYSFUNCTION DUE TO ARTERIAL INSUFFICIENCY: ICD-10-CM

## 2019-05-24 DIAGNOSIS — E66.9 CLASS 2 OBESITY WITH BODY MASS INDEX (BMI) OF 35.0 TO 35.9 IN ADULT, UNSPECIFIED OBESITY TYPE, UNSPECIFIED WHETHER SERIOUS COMORBIDITY PRESENT: ICD-10-CM

## 2019-05-24 DIAGNOSIS — Z12.11 COLON CANCER SCREENING: ICD-10-CM

## 2019-05-24 DIAGNOSIS — N40.1 BPH WITH URINARY OBSTRUCTION: ICD-10-CM

## 2019-05-24 DIAGNOSIS — E78.5 HYPERLIPIDEMIA, UNSPECIFIED HYPERLIPIDEMIA TYPE: ICD-10-CM

## 2019-05-24 DIAGNOSIS — N13.8 BPH WITH URINARY OBSTRUCTION: ICD-10-CM

## 2019-05-24 DIAGNOSIS — E11.42 TYPE 2 DIABETES MELLITUS WITH DIABETIC POLYNEUROPATHY, WITH LONG-TERM CURRENT USE OF INSULIN: ICD-10-CM

## 2019-05-24 PROCEDURE — 90732 PPSV23 VACC 2 YRS+ SUBQ/IM: CPT | Mod: S$GLB,,, | Performed by: INTERNAL MEDICINE

## 2019-05-24 PROCEDURE — 3008F PR BODY MASS INDEX (BMI) DOCUMENTED: ICD-10-PCS | Mod: CPTII,S$GLB,, | Performed by: INTERNAL MEDICINE

## 2019-05-24 PROCEDURE — 99999 PR PBB SHADOW E&M-EST. PATIENT-LVL IV: ICD-10-PCS | Mod: PBBFAC,,, | Performed by: INTERNAL MEDICINE

## 2019-05-24 PROCEDURE — 99214 PR OFFICE/OUTPT VISIT, EST, LEVL IV, 30-39 MIN: ICD-10-PCS | Mod: 25,S$GLB,, | Performed by: INTERNAL MEDICINE

## 2019-05-24 PROCEDURE — 99999 PR PBB SHADOW E&M-EST. PATIENT-LVL IV: CPT | Mod: PBBFAC,,, | Performed by: INTERNAL MEDICINE

## 2019-05-24 PROCEDURE — 3008F BODY MASS INDEX DOCD: CPT | Mod: CPTII,S$GLB,, | Performed by: INTERNAL MEDICINE

## 2019-05-24 PROCEDURE — 3077F PR MOST RECENT SYSTOLIC BLOOD PRESSURE >= 140 MM HG: ICD-10-PCS | Mod: CPTII,S$GLB,, | Performed by: INTERNAL MEDICINE

## 2019-05-24 PROCEDURE — 3077F SYST BP >= 140 MM HG: CPT | Mod: CPTII,S$GLB,, | Performed by: INTERNAL MEDICINE

## 2019-05-24 PROCEDURE — 90471 IMMUNIZATION ADMIN: CPT | Mod: S$GLB,,, | Performed by: INTERNAL MEDICINE

## 2019-05-24 PROCEDURE — 99214 OFFICE O/P EST MOD 30 MIN: CPT | Mod: 25,S$GLB,, | Performed by: INTERNAL MEDICINE

## 2019-05-24 PROCEDURE — 3079F PR MOST RECENT DIASTOLIC BLOOD PRESSURE 80-89 MM HG: ICD-10-PCS | Mod: CPTII,S$GLB,, | Performed by: INTERNAL MEDICINE

## 2019-05-24 PROCEDURE — 90471 PNEUMOCOCCAL POLYSACCHARIDE VACCINE 23-VALENT =>2YO SQ IM: ICD-10-PCS | Mod: S$GLB,,, | Performed by: INTERNAL MEDICINE

## 2019-05-24 PROCEDURE — 90732 PNEUMOCOCCAL POLYSACCHARIDE VACCINE 23-VALENT =>2YO SQ IM: ICD-10-PCS | Mod: S$GLB,,, | Performed by: INTERNAL MEDICINE

## 2019-05-24 PROCEDURE — 3079F DIAST BP 80-89 MM HG: CPT | Mod: CPTII,S$GLB,, | Performed by: INTERNAL MEDICINE

## 2019-05-24 PROCEDURE — 3044F PR MOST RECENT HEMOGLOBIN A1C LEVEL <7.0%: ICD-10-PCS | Mod: CPTII,S$GLB,, | Performed by: INTERNAL MEDICINE

## 2019-05-24 PROCEDURE — 3044F HG A1C LEVEL LT 7.0%: CPT | Mod: CPTII,S$GLB,, | Performed by: INTERNAL MEDICINE

## 2019-05-24 RX ORDER — INSULIN GLARGINE 100 [IU]/ML
16 INJECTION, SOLUTION SUBCUTANEOUS NIGHTLY
Qty: 15 ML | Refills: 2 | Status: SHIPPED | OUTPATIENT
Start: 2019-05-24 | End: 2020-02-17

## 2019-05-24 RX ORDER — LISINOPRIL 40 MG/1
40 TABLET ORAL DAILY
Qty: 90 TABLET | Refills: 1 | Status: SHIPPED | OUTPATIENT
Start: 2019-05-24 | End: 2019-12-20

## 2019-05-24 RX ORDER — FELODIPINE 10 MG/1
10 TABLET, EXTENDED RELEASE ORAL DAILY
Qty: 90 TABLET | Refills: 1 | Status: SHIPPED | OUTPATIENT
Start: 2019-05-24 | End: 2019-12-17 | Stop reason: SDUPTHER

## 2019-05-24 NOTE — PROGRESS NOTES
"Patient was given vaccine information sheet for the [id number] (pneumococcal polyvalent) vaccine. The area of injection was palpated using the acromion process as a landmark. This area was cleaned with alcohol. Using a 25g 1" safety needle, 0.5mL of the vaccine was placed into the left muscle. The injection site was dressed with a bandage. Patient experienced no complications and was discharged in stable condition. Pneumovax 23 (pneumococcal polyvalent) vaccine Lot: [id number] Exp: 04/09/2020.  Faby Rosario LPN      "

## 2019-05-24 NOTE — PATIENT INSTRUCTIONS
Your weight and BMI are elevated today.  Target BMI is less than 25.  Please start or countinue diet changes and exercise.     Your blood pressure was good.    I will order routine fasting labs in 4 weeks - at least 9 hours of fasting.    We will give you the Pneumococcal 23 Vaccine today.  I recommend getting on a waiting list at your pharmacy for the Shingles vaccine (Shingrix).    I will refer you to Optometry.  I will refer you to Podiatry.    Return in 6 months - sooner if needed.  Please come at least 15-20 minutes before your scheduled appointment time.
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
## 2019-06-03 PROBLEM — Z86.010 HISTORY OF COLON POLYPS: Status: ACTIVE | Noted: 2017-09-06

## 2019-06-04 DIAGNOSIS — N40.1 BPH WITH OBSTRUCTION/LOWER URINARY TRACT SYMPTOMS: ICD-10-CM

## 2019-06-04 DIAGNOSIS — N52.01 ERECTILE DYSFUNCTION DUE TO ARTERIAL INSUFFICIENCY: ICD-10-CM

## 2019-06-04 DIAGNOSIS — N13.8 BPH WITH OBSTRUCTION/LOWER URINARY TRACT SYMPTOMS: ICD-10-CM

## 2019-06-04 RX ORDER — SILDENAFIL 100 MG/1
100 TABLET, FILM COATED ORAL DAILY PRN
Qty: 10 TABLET | Refills: 11 | Status: SHIPPED | OUTPATIENT
Start: 2019-06-04 | End: 2019-06-05 | Stop reason: SDUPTHER

## 2019-06-04 RX ORDER — SILDENAFIL 25 MG/1
25 TABLET, FILM COATED ORAL DAILY PRN
OUTPATIENT
Start: 2019-06-04 | End: 2020-06-03

## 2019-06-04 RX ORDER — TAMSULOSIN HYDROCHLORIDE 0.4 MG/1
0.4 CAPSULE ORAL DAILY
Qty: 90 CAPSULE | Refills: 3 | OUTPATIENT
Start: 2019-06-04 | End: 2019-09-02

## 2019-06-04 NOTE — TELEPHONE ENCOUNTER
----- Message from Brittaney Wright sent at 6/4/2019  7:56 AM CDT -----  Contact: pt  Name of Who is Calling: Steven      What is the request in detail: pt states that he is having a change of mind and wants to get the Sildenafil refilled.Pt would like to receive a call from the doctor as well to speak about some other concerns.      Please send into:[facility]/pharmacy #[id number] - [place], [place] - [address] [phone] (Phone)  [phone] (Fax)          Can the clinic reply by [facility]: no      What Number to Call Back if not in [facility]: [phone] or [phone]

## 2019-06-05 ENCOUNTER — TELEPHONE (OUTPATIENT)
Dept: UROLOGY | Facility: CLINIC | Age: 59
End: 2019-06-05

## 2019-06-05 DIAGNOSIS — N52.01 ERECTILE DYSFUNCTION DUE TO ARTERIAL INSUFFICIENCY: Primary | ICD-10-CM

## 2019-06-05 RX ORDER — SILDENAFIL 100 MG/1
100 TABLET, FILM COATED ORAL DAILY PRN
Qty: 10 TABLET | Refills: 11 | Status: SHIPPED | OUTPATIENT
Start: 2019-06-05 | End: 2020-02-19

## 2019-06-05 NOTE — TELEPHONE ENCOUNTER
----- Message from Ashley Kc sent at 6/5/2019 12:14 PM CDT -----  Contact: JONNY TODD [[id number]]                                                   MEDICATION  REFILL  REQUEST      Please refill the medication(s) listed below. Please call the patient when the prescription(s) is ready for  at this phone number   JONNY TODD // # [phone]      Medication #1  sildenafil (VIAGRA) 100 MG tablet      Preferred Pharmacy:  [facility]/pharmacy #[id number] - [place], [place] - [address]     [phone] (Phone)  [phone] (Fax)

## 2019-06-05 NOTE — TELEPHONE ENCOUNTER
Rx was sent to [facility] pharm as discussed during our visit. I just resent.    Let me know if you have further issues.    Best regards,  Chris    ----- Message from Olena Sheikh LPN sent at 6/5/2019  2:33 PM CDT -----  Contact: JONNY TODD [[id number]]      ----- Message -----  From: Keyla Granados  Sent: 6/5/2019   2:14 PM  To: Jason Perez Staff    Can the clinic reply in [facility]:      Please refill the medication(s) listed below. Please call the patient when the prescription(s) is ready for  at the phone number    Medication #1:sildenafil (VIAGRA) 100 MG tablet    Medication #2:      Preferred Pharmacy:[facility]

## 2019-06-05 NOTE — TELEPHONE ENCOUNTER
Pt requested refill on Viagra. Medication was refilled by another physician on 06/05/2019.  Faby Rosario LPN

## 2019-06-05 NOTE — TELEPHONE ENCOUNTER
----- Message from Chris Gomez, DNP sent at 6/5/2019  2:47 PM CDT -----  Regarding: viagra rx  Rx was sent [facility] as discussed during our visit. I just resent.    Let me know if you have further issues.    Best regards,  Chris

## 2019-06-05 NOTE — TELEPHONE ENCOUNTER
----- Message from Ashley Kc sent at 6/5/2019 12:14 PM CDT -----  Contact: JONNY TODD [[id number]]                                                   MEDICATION  REFILL  REQUEST      Please refill the medication(s) listed below. Please call the patient when the prescription(s) is ready for  at this phone number   JONNY TODD // # [phone]      Medication #1  sildenafil (VIAGRA) 100 MG tablet      Preferred Pharmacy:  [facility]/pharmacy #[id number] - [place], [place] - [address]     [phone] (Phone)  [phone] (Fax)

## 2019-06-29 ENCOUNTER — NURSE TRIAGE (OUTPATIENT)
Dept: ADMINISTRATIVE | Facility: CLINIC | Age: 59
End: 2019-06-29

## 2019-06-29 RX ORDER — METOPROLOL TARTRATE 100 MG/1
100 TABLET ORAL 2 TIMES DAILY
Qty: 60 TABLET | Refills: 0 | Status: SHIPPED | OUTPATIENT
Start: 2019-06-29 | End: 2019-07-27 | Stop reason: SDUPTHER

## 2019-06-29 RX ORDER — ATORVASTATIN CALCIUM 80 MG/1
80 TABLET, FILM COATED ORAL DAILY
Qty: 30 TABLET | Refills: 0 | Status: SHIPPED | OUTPATIENT
Start: 2019-06-29 | End: 2019-07-27 | Stop reason: SDUPTHER

## 2019-06-29 RX ORDER — METFORMIN HYDROCHLORIDE 500 MG/1
500 TABLET ORAL 2 TIMES DAILY WITH MEALS
Qty: 60 TABLET | Refills: 0 | Status: SHIPPED | OUTPATIENT
Start: 2019-06-29 | End: 2019-07-27 | Stop reason: SDUPTHER

## 2019-06-29 NOTE — TELEPHONE ENCOUNTER
Reviewed chart re: requested medications, reasonable to call in refill on atorvastatin, metoprolol, metformin. Recommend patient check in with Dr Carson in the next week or so to ensure that he should be taking all of the medications currently on his med list.

## 2019-06-29 NOTE — TELEPHONE ENCOUNTER
"    Reason for Disposition   [1] Request for URGENT new prescription or refill of "essential" medication (i.e., likelihood of harm to patient if not taken) AND [2] triager unable to fill per unit policy    Protocols used: MEDICATION QUESTION CALL-A-    Patient's wife states he needs refills on his lipitor, metoprol and meftformin. The patient takes some of his meds, but his wife is organizing everything. Called Dr. Vela for a 30 day supply of 3 medications. Mrs. Shields verbalized understanding.   "

## 2019-07-01 DIAGNOSIS — Z79.4 TYPE 2 DIABETES MELLITUS WITH COMPLICATION, WITH LONG-TERM CURRENT USE OF INSULIN: Primary | ICD-10-CM

## 2019-07-01 DIAGNOSIS — E11.8 TYPE 2 DIABETES MELLITUS WITH COMPLICATION, WITH LONG-TERM CURRENT USE OF INSULIN: Primary | ICD-10-CM

## 2019-07-01 RX ORDER — INSULIN PUMP SYRINGE, 3 ML
1 EACH MISCELLANEOUS
COMMUNITY
End: 2019-07-01

## 2019-07-01 RX ORDER — LANCETS 33 GAUGE
EACH MISCELLANEOUS
Qty: 200 EACH | Refills: 5 | Status: SHIPPED | OUTPATIENT
Start: 2019-07-01 | End: 2020-05-27

## 2019-07-01 RX ORDER — LANCETS 33 GAUGE
EACH MISCELLANEOUS
COMMUNITY
End: 2019-07-01 | Stop reason: SDUPTHER

## 2019-07-01 RX ORDER — DEXTROSE 4 G
TABLET,CHEWABLE ORAL
Qty: 1 EACH | Refills: 0 | Status: SHIPPED | OUTPATIENT
Start: 2019-07-01 | End: 2020-05-27

## 2019-07-11 ENCOUNTER — PATIENT OUTREACH (OUTPATIENT)
Dept: ADMINISTRATIVE | Facility: OTHER | Age: 59
End: 2019-07-11

## 2019-07-28 RX ORDER — METOPROLOL TARTRATE 100 MG/1
TABLET ORAL
Qty: 60 TABLET | Refills: 3 | Status: SHIPPED | OUTPATIENT
Start: 2019-07-28 | End: 2020-01-22 | Stop reason: SDUPTHER

## 2019-07-28 RX ORDER — ATORVASTATIN CALCIUM 80 MG/1
TABLET, FILM COATED ORAL
Qty: 30 TABLET | Refills: 3 | Status: ON HOLD | OUTPATIENT
Start: 2019-07-28 | End: 2019-09-06 | Stop reason: ALTCHOICE

## 2019-07-28 RX ORDER — METFORMIN HYDROCHLORIDE 500 MG/1
TABLET ORAL
Qty: 60 TABLET | Refills: 3 | Status: SHIPPED | OUTPATIENT
Start: 2019-07-28 | End: 2020-01-22 | Stop reason: SDUPTHER

## 2019-08-16 ENCOUNTER — PATIENT OUTREACH (OUTPATIENT)
Dept: ADMINISTRATIVE | Facility: HOSPITAL | Age: 59
End: 2019-08-16

## 2019-08-16 DIAGNOSIS — E11.9 TYPE 2 DIABETES MELLITUS WITHOUT COMPLICATION, WITH LONG-TERM CURRENT USE OF INSULIN: Primary | ICD-10-CM

## 2019-08-16 DIAGNOSIS — Z79.4 TYPE 2 DIABETES MELLITUS WITHOUT COMPLICATION, WITH LONG-TERM CURRENT USE OF INSULIN: Primary | ICD-10-CM

## 2019-08-19 ENCOUNTER — TELEPHONE (OUTPATIENT)
Dept: UROLOGY | Facility: CLINIC | Age: 59
End: 2019-08-19

## 2019-08-19 NOTE — TELEPHONE ENCOUNTER
Called to attempt to reschedule appt, as Chris is on vacation. Unable to leave . To be offered an appt next week.

## 2019-09-04 ENCOUNTER — PATIENT OUTREACH (OUTPATIENT)
Dept: ADMINISTRATIVE | Facility: HOSPITAL | Age: 59
End: 2019-09-04

## 2019-09-06 ENCOUNTER — HOSPITAL ENCOUNTER (OUTPATIENT)
Facility: HOSPITAL | Age: 59
Discharge: HOME | End: 2019-09-08
Attending: EMERGENCY MEDICINE | Admitting: HOSPITALIST
Payer: COMMERCIAL

## 2019-09-06 DIAGNOSIS — R41.82 ALTERED MENTAL STATUS, UNSPECIFIED ALTERED MENTAL STATUS TYPE: Primary | ICD-10-CM

## 2019-09-06 DIAGNOSIS — I10 MALIGNANT HYPERTENSION: ICD-10-CM

## 2019-09-06 DIAGNOSIS — I63.9 ACUTE CEREBRAL INFARCTION: ICD-10-CM

## 2019-09-06 DIAGNOSIS — E11.9 TYPE 2 DIABETES MELLITUS WITHOUT COMPLICATION: ICD-10-CM

## 2019-09-06 DIAGNOSIS — R07.9 CHEST PAIN: ICD-10-CM

## 2019-09-06 DIAGNOSIS — I16.0 HYPERTENSIVE URGENCY: ICD-10-CM

## 2019-09-06 DIAGNOSIS — R29.90 STROKE-LIKE SYMPTOMS: ICD-10-CM

## 2019-09-06 DIAGNOSIS — R42 VERTIGO: ICD-10-CM

## 2019-09-06 PROBLEM — R11.2 NON-INTRACTABLE VOMITING WITH NAUSEA: Status: ACTIVE | Noted: 2019-09-06

## 2019-09-06 LAB
ALBUMIN SERPL BCP-MCNC: 4.1 G/DL (ref 3.5–5.2)
ALP SERPL-CCNC: 84 U/L (ref 38–126)
ALT SERPL W/O P-5'-P-CCNC: 20 U/L (ref 10–44)
AMPHET+METHAMPHET UR QL: NEGATIVE
ANION GAP SERPL CALC-SCNC: 7 MMOL/L (ref 8–16)
AORTIC ROOT ANNULUS: 2.6 CM
AORTIC VALVE CUSP SEPERATION: 1.57 CM
AST SERPL-CCNC: 27 U/L (ref 15–46)
AV INDEX (PROSTH): 0.78
AV MEAN GRADIENT: 7 MMHG
AV PEAK GRADIENT: 11 MMHG
AV VALVE AREA: 2.11 CM2
AV VELOCITY RATIO: 0.8
BACTERIA #/AREA URNS AUTO: NORMAL /HPF
BARBITURATES UR QL SCN>200 NG/ML: NEGATIVE
BASOPHILS # BLD AUTO: 0.2 K/UL (ref 0–0.2)
BASOPHILS NFR BLD: 2.4 % (ref 0–1.9)
BENZODIAZ UR QL SCN>200 NG/ML: NEGATIVE
BILIRUB SERPL-MCNC: 0.4 MG/DL (ref 0.1–1)
BILIRUB UR QL STRIP: NEGATIVE
BSA FOR ECHO PROCEDURE: 2.27 M2
BUN SERPL-MCNC: 16 MG/DL (ref 2–20)
BZE UR QL SCN: NEGATIVE
CALCIUM SERPL-MCNC: 8.8 MG/DL (ref 8.7–10.5)
CANNABINOIDS UR QL SCN: NEGATIVE
CHLORIDE SERPL-SCNC: 107 MMOL/L (ref 95–110)
CLARITY UR REFRACT.AUTO: CLEAR
CO2 SERPL-SCNC: 29 MMOL/L (ref 23–29)
COLOR UR AUTO: ABNORMAL
CREAT SERPL-MCNC: 1.18 MG/DL (ref 0.5–1.4)
CREAT UR-MCNC: 36.2 MG/DL (ref 23–375)
CV ECHO LV RWT: 0.82 CM
DIFFERENTIAL METHOD: ABNORMAL
DOP CALC AO PEAK VEL: 1.69 M/S
DOP CALC AO VTI: 31.66 CM
DOP CALC LVOT AREA: 2.7 CM2
DOP CALC LVOT DIAMETER: 1.86 CM
DOP CALC LVOT PEAK VEL: 1.35 M/S
DOP CALC LVOT STROKE VOLUME: 66.84 CM3
DOP CALCLVOT PEAK VEL VTI: 24.61 CM
E WAVE DECELERATION TIME: 118.67 MSEC
E/A RATIO: 0.77
ECHO LV POSTERIOR WALL: 1.9 CM (ref 0.6–1.1)
EOSINOPHIL # BLD AUTO: 0.4 K/UL (ref 0–0.5)
EOSINOPHIL NFR BLD: 5.1 % (ref 0–8)
ERYTHROCYTE [DISTWIDTH] IN BLOOD BY AUTOMATED COUNT: 15.7 % (ref 11.5–14.5)
EST. GFR  (AFRICAN AMERICAN): >60 ML/MIN/1.73 M^2
EST. GFR  (NON AFRICAN AMERICAN): >60 ML/MIN/1.73 M^2
ESTIMATED AVG GLUCOSE: 123 MG/DL (ref 68–131)
ESTIMATED AVG GLUCOSE: 123 MG/DL (ref 68–131)
ETHANOL SERPL-MCNC: <10 MG/DL
FRACTIONAL SHORTENING: 48 % (ref 28–44)
GLUCOSE SERPL-MCNC: 194 MG/DL (ref 70–110)
GLUCOSE UR QL STRIP: NEGATIVE
HBA1C MFR BLD HPLC: 5.9 % (ref 4–5.6)
HBA1C MFR BLD HPLC: 5.9 % (ref 4–5.6)
HCT VFR BLD AUTO: 41.4 % (ref 40–54)
HGB BLD-MCNC: 13.8 G/DL (ref 14–18)
HGB UR QL STRIP: ABNORMAL
HYALINE CASTS UR QL AUTO: 0 /LPF
INR PPP: 1.1 (ref 0.8–1.2)
INTERVENTRICULAR SEPTUM: 2 CM (ref 0.6–1.1)
IVRT: 0.08 MSEC
KETONES UR QL STRIP: NEGATIVE
LEFT ATRIUM SIZE: 4.53 CM
LEFT INTERNAL DIMENSION IN SYSTOLE: 2.42 CM (ref 2.1–4)
LEFT VENTRICLE DIASTOLIC VOLUME INDEX: 45.21 ML/M2
LEFT VENTRICLE DIASTOLIC VOLUME: 99.58 ML
LEFT VENTRICLE MASS INDEX: 198 G/M2
LEFT VENTRICLE SYSTOLIC VOLUME INDEX: 9.4 ML/M2
LEFT VENTRICLE SYSTOLIC VOLUME: 20.67 ML
LEFT VENTRICULAR INTERNAL DIMENSION IN DIASTOLE: 4.64 CM (ref 3.5–6)
LEFT VENTRICULAR MASS: 435.68 G
LEUKOCYTE ESTERASE UR QL STRIP: NEGATIVE
LYMPHOCYTES # BLD AUTO: 5 K/UL (ref 1–4.8)
LYMPHOCYTES NFR BLD: 59.3 % (ref 18–48)
MCH RBC QN AUTO: 26.5 PG (ref 27–31)
MCHC RBC AUTO-ENTMCNC: 33.3 G/DL (ref 32–36)
MCV RBC AUTO: 80 FL (ref 82–98)
METHADONE UR QL SCN>300 NG/ML: NEGATIVE
MICROSCOPIC COMMENT: NORMAL
MONOCYTES # BLD AUTO: 0.9 K/UL (ref 0.3–1)
MONOCYTES NFR BLD: 11 % (ref 4–15)
MV PEAK A VEL: 1.03 M/S
MV PEAK E VEL: 0.79 M/S
NEUTROPHILS # BLD AUTO: 1.9 K/UL (ref 1.8–7.7)
NEUTROPHILS NFR BLD: 22.2 % (ref 38–73)
NITRITE UR QL STRIP: NEGATIVE
NON-SQ EPI CELLS #/AREA URNS AUTO: NORMAL /HPF
NT-PROBNP: 175 PG/ML (ref 5–900)
OPIATES UR QL SCN: NEGATIVE
PCP UR QL SCN>25 NG/ML: NEGATIVE
PH UR STRIP: 7 [PH] (ref 5–8)
PISA TR MAX VEL: 1.71 M/S
PLATELET # BLD AUTO: 203 K/UL (ref 150–350)
PMV BLD AUTO: 11.5 FL (ref 9.2–12.9)
POCT GLUCOSE: 112 MG/DL (ref 70–110)
POCT GLUCOSE: 123 MG/DL (ref 70–110)
POCT GLUCOSE: 224 MG/DL (ref 70–110)
POCT GLUCOSE: 242 MG/DL (ref 70–110)
POTASSIUM SERPL-SCNC: 2.7 MMOL/L (ref 3.5–5.1)
POTASSIUM SERPL-SCNC: 2.8 MMOL/L (ref 3.5–5.1)
PROT SERPL-MCNC: 7.7 G/DL (ref 6–8.4)
PROT UR QL STRIP: ABNORMAL
PROTHROMBIN TIME: 11.7 SEC (ref 9–12.5)
PULM VEIN S/D RATIO: 0.82
PV PEAK D VEL: 0.34 M/S
PV PEAK S VEL: 0.28 M/S
PV PEAK VELOCITY: 1.28 CM/S
RA PRESSURE: 3 MMHG
RBC # BLD AUTO: 5.2 M/UL (ref 4.6–6.2)
RBC #/AREA URNS AUTO: 0 /HPF (ref 0–4)
RIGHT VENTRICULAR END-DIASTOLIC DIMENSION: 3.09 CM
SODIUM SERPL-SCNC: 143 MMOL/L (ref 136–145)
SP GR UR STRIP: 1.01 (ref 1–1.03)
TOXICOLOGY INFORMATION: NORMAL
TR MAX PG: 12 MMHG
TROPONIN I SERPL DL<=0.01 NG/ML-MCNC: 0.02 NG/ML (ref 0.01–0.03)
TROPONIN I SERPL DL<=0.01 NG/ML-MCNC: 0.05 NG/ML (ref 0–0.03)
TV REST PULMONARY ARTERY PRESSURE: 15 MMHG
URN SPEC COLLECT METH UR: ABNORMAL
UROBILINOGEN UR STRIP-ACNC: NEGATIVE EU/DL
WBC # BLD AUTO: 8.45 K/UL (ref 3.9–12.7)
WBC #/AREA URNS AUTO: 0 /HPF (ref 0–5)

## 2019-09-06 PROCEDURE — 93005 ELECTROCARDIOGRAM TRACING: CPT | Mod: ER

## 2019-09-06 PROCEDURE — 86592 SYPHILIS TEST NON-TREP QUAL: CPT

## 2019-09-06 PROCEDURE — 96374 THER/PROPH/DIAG INJ IV PUSH: CPT | Mod: ER

## 2019-09-06 PROCEDURE — 93005 ELECTROCARDIOGRAM TRACING: CPT

## 2019-09-06 PROCEDURE — 96375 TX/PRO/DX INJ NEW DRUG ADDON: CPT | Mod: ER,59

## 2019-09-06 PROCEDURE — 63600175 PHARM REV CODE 636 W HCPCS: Performed by: NURSE PRACTITIONER

## 2019-09-06 PROCEDURE — 85610 PROTHROMBIN TIME: CPT | Mod: ER

## 2019-09-06 PROCEDURE — 80307 DRUG TEST PRSMV CHEM ANLYZR: CPT | Mod: ER

## 2019-09-06 PROCEDURE — 82746 ASSAY OF FOLIC ACID SERUM: CPT

## 2019-09-06 PROCEDURE — 25000003 PHARM REV CODE 250: Performed by: NURSE PRACTITIONER

## 2019-09-06 PROCEDURE — 84132 ASSAY OF SERUM POTASSIUM: CPT | Mod: 91

## 2019-09-06 PROCEDURE — 80320 DRUG SCREEN QUANTALCOHOLS: CPT | Mod: ER

## 2019-09-06 PROCEDURE — G0378 HOSPITAL OBSERVATION PER HR: HCPCS

## 2019-09-06 PROCEDURE — 99291 CRITICAL CARE FIRST HOUR: CPT | Mod: 25,ER

## 2019-09-06 PROCEDURE — 84484 ASSAY OF TROPONIN QUANT: CPT | Mod: ER

## 2019-09-06 PROCEDURE — 94760 N-INVAS EAR/PLS OXIMETRY 1: CPT | Mod: ER

## 2019-09-06 PROCEDURE — 85025 COMPLETE CBC W/AUTO DIFF WBC: CPT | Mod: ER

## 2019-09-06 PROCEDURE — 96376 TX/PRO/DX INJ SAME DRUG ADON: CPT | Mod: 59

## 2019-09-06 PROCEDURE — 99244 OFF/OP CNSLTJ NEW/EST MOD 40: CPT | Mod: GT,,, | Performed by: PSYCHIATRY & NEUROLOGY

## 2019-09-06 PROCEDURE — 81000 URINALYSIS NONAUTO W/SCOPE: CPT | Mod: 59,ER

## 2019-09-06 PROCEDURE — 83036 HEMOGLOBIN GLYCOSYLATED A1C: CPT

## 2019-09-06 PROCEDURE — 83880 ASSAY OF NATRIURETIC PEPTIDE: CPT | Mod: ER

## 2019-09-06 PROCEDURE — 84484 ASSAY OF TROPONIN QUANT: CPT | Mod: 91

## 2019-09-06 PROCEDURE — 93010 EKG 12-LEAD: ICD-10-PCS | Mod: ,,, | Performed by: INTERNAL MEDICINE

## 2019-09-06 PROCEDURE — 80053 COMPREHEN METABOLIC PANEL: CPT | Mod: ER

## 2019-09-06 PROCEDURE — 27000221 HC OXYGEN, UP TO 24 HOURS: Mod: ER

## 2019-09-06 PROCEDURE — 36415 COLL VENOUS BLD VENIPUNCTURE: CPT

## 2019-09-06 PROCEDURE — 93010 ELECTROCARDIOGRAM REPORT: CPT | Mod: ,,, | Performed by: INTERNAL MEDICINE

## 2019-09-06 PROCEDURE — 99244 PR OFFICE CONSULTATION,LEVEL IV: ICD-10-PCS | Mod: GT,,, | Performed by: PSYCHIATRY & NEUROLOGY

## 2019-09-06 PROCEDURE — 96375 TX/PRO/DX INJ NEW DRUG ADDON: CPT

## 2019-09-06 PROCEDURE — 63600175 PHARM REV CODE 636 W HCPCS: Mod: ER | Performed by: EMERGENCY MEDICINE

## 2019-09-06 PROCEDURE — 94761 N-INVAS EAR/PLS OXIMETRY MLT: CPT

## 2019-09-06 PROCEDURE — 82962 GLUCOSE BLOOD TEST: CPT | Mod: ER

## 2019-09-06 PROCEDURE — 96372 THER/PROPH/DIAG INJ SC/IM: CPT

## 2019-09-06 PROCEDURE — S5571 INSULIN DISPOS PEN 3 ML: HCPCS | Performed by: NURSE PRACTITIONER

## 2019-09-06 PROCEDURE — 82607 VITAMIN B-12: CPT

## 2019-09-06 RX ORDER — ACETAMINOPHEN 325 MG/1
650 TABLET ORAL EVERY 4 HOURS PRN
Status: DISCONTINUED | OUTPATIENT
Start: 2019-09-06 | End: 2019-09-08 | Stop reason: HOSPADM

## 2019-09-06 RX ORDER — POTASSIUM CHLORIDE 20 MEQ/1
40 TABLET, EXTENDED RELEASE ORAL 2 TIMES DAILY
Status: DISCONTINUED | OUTPATIENT
Start: 2019-09-06 | End: 2019-09-06

## 2019-09-06 RX ORDER — HYDRALAZINE HYDROCHLORIDE 20 MG/ML
10 INJECTION INTRAMUSCULAR; INTRAVENOUS
Status: ACTIVE | OUTPATIENT
Start: 2019-09-06 | End: 2019-09-06

## 2019-09-06 RX ORDER — LISINOPRIL 20 MG/1
40 TABLET ORAL DAILY
Status: DISCONTINUED | OUTPATIENT
Start: 2019-09-07 | End: 2019-09-08 | Stop reason: HOSPADM

## 2019-09-06 RX ORDER — ASPIRIN 81 MG/1
81 TABLET ORAL DAILY
Status: DISCONTINUED | OUTPATIENT
Start: 2019-09-06 | End: 2019-09-08 | Stop reason: HOSPADM

## 2019-09-06 RX ORDER — INSULIN ASPART 100 [IU]/ML
0-5 INJECTION, SOLUTION INTRAVENOUS; SUBCUTANEOUS
Status: DISCONTINUED | OUTPATIENT
Start: 2019-09-06 | End: 2019-09-08 | Stop reason: HOSPADM

## 2019-09-06 RX ORDER — ATORVASTATIN CALCIUM 40 MG/1
40 TABLET, FILM COATED ORAL NIGHTLY
Status: DISCONTINUED | OUTPATIENT
Start: 2019-09-06 | End: 2019-09-08 | Stop reason: HOSPADM

## 2019-09-06 RX ORDER — IBUPROFEN 200 MG
24 TABLET ORAL
Status: DISCONTINUED | OUTPATIENT
Start: 2019-09-06 | End: 2019-09-08 | Stop reason: HOSPADM

## 2019-09-06 RX ORDER — GLUCAGON 1 MG
1 KIT INJECTION
Status: DISCONTINUED | OUTPATIENT
Start: 2019-09-06 | End: 2019-09-08 | Stop reason: HOSPADM

## 2019-09-06 RX ORDER — MAGNESIUM SULFATE HEPTAHYDRATE 40 MG/ML
2 INJECTION, SOLUTION INTRAVENOUS ONCE
Status: COMPLETED | OUTPATIENT
Start: 2019-09-06 | End: 2019-09-06

## 2019-09-06 RX ORDER — SODIUM CHLORIDE AND POTASSIUM CHLORIDE 150; 900 MG/100ML; MG/100ML
1000 INJECTION, SOLUTION INTRAVENOUS
Status: COMPLETED | OUTPATIENT
Start: 2019-09-06 | End: 2019-09-06

## 2019-09-06 RX ORDER — ONDANSETRON 2 MG/ML
4 INJECTION INTRAMUSCULAR; INTRAVENOUS EVERY 8 HOURS PRN
Status: DISCONTINUED | OUTPATIENT
Start: 2019-09-06 | End: 2019-09-08 | Stop reason: HOSPADM

## 2019-09-06 RX ORDER — METOPROLOL TARTRATE 50 MG/1
100 TABLET ORAL 2 TIMES DAILY
Status: DISCONTINUED | OUTPATIENT
Start: 2019-09-06 | End: 2019-09-08

## 2019-09-06 RX ORDER — ONDANSETRON 2 MG/ML
8 INJECTION INTRAMUSCULAR; INTRAVENOUS
Status: COMPLETED | OUTPATIENT
Start: 2019-09-06 | End: 2019-09-06

## 2019-09-06 RX ORDER — SODIUM CHLORIDE 0.9 % (FLUSH) 0.9 %
10 SYRINGE (ML) INJECTION
Status: DISCONTINUED | OUTPATIENT
Start: 2019-09-06 | End: 2019-09-08 | Stop reason: HOSPADM

## 2019-09-06 RX ORDER — IBUPROFEN 200 MG
16 TABLET ORAL
Status: DISCONTINUED | OUTPATIENT
Start: 2019-09-06 | End: 2019-09-08 | Stop reason: HOSPADM

## 2019-09-06 RX ORDER — POTASSIUM CHLORIDE 20 MEQ/1
40 TABLET, EXTENDED RELEASE ORAL 2 TIMES DAILY
Status: DISCONTINUED | OUTPATIENT
Start: 2019-09-06 | End: 2019-09-07

## 2019-09-06 RX ORDER — HYDRALAZINE HYDROCHLORIDE 20 MG/ML
20 INJECTION INTRAMUSCULAR; INTRAVENOUS
Status: COMPLETED | OUTPATIENT
Start: 2019-09-06 | End: 2019-09-06

## 2019-09-06 RX ADMIN — METOPROLOL TARTRATE 100 MG: 50 TABLET ORAL at 10:09

## 2019-09-06 RX ADMIN — INSULIN ASPART 2 UNITS: 100 INJECTION, SOLUTION INTRAVENOUS; SUBCUTANEOUS at 05:09

## 2019-09-06 RX ADMIN — SODIUM CHLORIDE AND POTASSIUM CHLORIDE 1000 ML: .9; .15 SOLUTION INTRAVENOUS at 05:09

## 2019-09-06 RX ADMIN — ASPIRIN 81 MG: 81 TABLET, COATED ORAL at 01:09

## 2019-09-06 RX ADMIN — INSULIN DETEMIR 10 UNITS: 100 INJECTION, SOLUTION SUBCUTANEOUS at 10:09

## 2019-09-06 RX ADMIN — POTASSIUM CHLORIDE 40 MEQ: 20 TABLET, EXTENDED RELEASE ORAL at 09:09

## 2019-09-06 RX ADMIN — ATORVASTATIN CALCIUM 40 MG: 40 TABLET, FILM COATED ORAL at 10:09

## 2019-09-06 RX ADMIN — ACETAMINOPHEN 650 MG: 325 TABLET ORAL at 11:09

## 2019-09-06 RX ADMIN — POTASSIUM CHLORIDE 40 MEQ: 20 TABLET, EXTENDED RELEASE ORAL at 10:09

## 2019-09-06 RX ADMIN — HYDRALAZINE HYDROCHLORIDE 20 MG: 20 INJECTION INTRAMUSCULAR; INTRAVENOUS at 04:09

## 2019-09-06 RX ADMIN — ONDANSETRON 4 MG: 2 INJECTION INTRAMUSCULAR; INTRAVENOUS at 09:09

## 2019-09-06 RX ADMIN — ONDANSETRON 8 MG: 2 INJECTION INTRAMUSCULAR; INTRAVENOUS at 04:09

## 2019-09-06 RX ADMIN — MAGNESIUM SULFATE IN WATER 2 G: 40 INJECTION, SOLUTION INTRAVENOUS at 01:09

## 2019-09-06 NOTE — ASSESSMENT & PLAN NOTE
Non-intractable vomiting with nausea without evidence of new neurological findings on exam. Doubt stroke now. Probably related to malignant hypertension. Admit to [facility] and get MRI brain.

## 2019-09-06 NOTE — ASSESSMENT & PLAN NOTE
Hemoglobin A1c 5.9  Current BS: 194  POC glucose checks ac meals and nightly  Continue home Detemir at decreased dose: 10 units nightly  Low dose SSI PRN  Hypoglycemia protocol PRN  Hold oral hypoglycemic agents  Diabetic Diet  LDL: pending  Patient states he was taken off statin by PCP

## 2019-09-06 NOTE — ED PROVIDER NOTES
Encounter Date: 2019       History     Chief Complaint   Patient presents with    Dizziness     PT found by EMS sitting in chair with complaints of dizziness, blurred vision, and emesis. Patient stated symptoms started at 2am this morning. Patient stated he woke up with dizziness. Patient has no numbness or tingling. No new deficits per patient.        2019  3:46 AM    Chief Complaint:  Dizziness      The patient is a 59 y.o. male presenting with dizziness.  Patient reports waking up and feeling dizzy and with loss of balance.  Patient also reports accompanying nausea/vomiting.  Patient reports he had similar symptoms with a stroke in the past.  Patient denies fever/chills/diarrhea/headache/stiff neck/skin rash.  Unable to document specific time symptoms started since patient awoke with symptoms. History of present illness is difficult to obtain since patient is very slow to follow my command.    Patient has a past medical history of Diabetes mellitus type II, Hyperlipidemia, Hypertension, and Stroke.  Patient has a past surgical history that includes Nerve graft; Hand surgery (Left); and Ankle fracture surgery.        Review of patient's allergies indicates:   Allergen Reactions    Pneumococcal 23-timothy ps vaccine      Past Medical History:   Diagnosis Date    Diabetes mellitus type II     Hyperlipidemia     Hypertension     Stroke     2011, denies deficits      Past Surgical History:   Procedure Laterality Date    ANKLE FRACTURE SURGERY      HAND SURGERY Left     NERVE GRAFT       Family History   Problem Relation Age of Onset    Heart disease Mother     Heart disease Father      Social History     Tobacco Use    Smoking status: Former Smoker     Packs/day: 1.00     Years: 15.00     Pack years: 15.00     Last attempt to quit: 1990     Years since quittin.7    Smokeless tobacco: Never Used   Substance Use Topics    Alcohol use: No     Frequency: Never    Drug use: No     Review  of Systems   Eyes: Positive for visual disturbance.   Gastrointestinal: Positive for nausea.   Neurological: Positive for dizziness and light-headedness.   All other systems reviewed and are negative.      Physical Exam     Initial Vitals [09/06/19 0340]   BP Pulse Resp Temp SpO2   (!) 242/111 93 20 98 °F (36.7 °C) 98 %      MAP       --         Physical Exam    Nursing note and vitals reviewed.  Constitutional: He appears well-developed and well-nourished.   HENT:   Head: Normocephalic and atraumatic.   Right Ear: External ear normal.   Left Ear: External ear normal.   Nose: Nose normal.   Mouth/Throat: Oropharynx is clear and moist.   Eyes: Conjunctivae and EOM are normal. Pupils are equal, round, and reactive to light.   Neck: Normal range of motion. Neck supple.   Cardiovascular: Normal rate, regular rhythm, normal heart sounds and intact distal pulses.   Pulmonary/Chest: Breath sounds normal.   Abdominal: Soft. Bowel sounds are normal.   Musculoskeletal: Normal range of motion.   Neurological: He is alert and oriented to person, place, and time. He has normal strength. GCS score is 15. GCS eye subscore is 4. GCS verbal subscore is 5. GCS motor subscore is 6.   Skin: Skin is warm. Capillary refill takes less than 2 seconds.   Psychiatric: He has a normal mood and affect.         ED Course   Critical Care  Date/Time: 9/6/2019 5:11 AM  Performed by: Steven Mast MD  Authorized by: Steven Mast MD   Direct patient critical care time: 32 minutes  Ordering / reviewing critical care time: 10 minutes  Documentation critical care time: 12 minutes  Consulting other physicians critical care time: 8 minutes  Total critical care time (exclusive of procedural time) : 62 minutes        Labs Reviewed   CBC W/ AUTO DIFFERENTIAL - Abnormal; Notable for the following components:       Result Value    Hemoglobin 13.8 (*)     Mean Corpuscular Volume 80 (*)     Mean Corpuscular Hemoglobin 26.5 (*)     RDW 15.7 (*)      Lymph # 5.0 (*)     Gran% 22.2 (*)     Lymph% 59.3 (*)     Basophil% 2.4 (*)     All other components within normal limits   COMPREHENSIVE METABOLIC PANEL - Abnormal; Notable for the following components:    Potassium 2.7 (*)     Glucose 194 (*)     Anion Gap 7 (*)     All other components within normal limits    Narrative:      K  critical result(s) called and verbal readback obtained from RHIANNON Barbour RN, 09/06/2019 04:37   URINALYSIS, REFLEX TO URINE CULTURE - Abnormal; Notable for the following components:    Protein, UA 2+ (*)     Occult Blood UA Trace (*)     All other components within normal limits    Narrative:     Preferred Collection Type->Urine, Clean Catch   POCT GLUCOSE - Abnormal; Notable for the following components:    POCT Glucose 224 (*)     All other components within normal limits   TROPONIN I   ALCOHOL,MEDICAL (ETHANOL)   DRUG SCREEN PANEL, URINE EMERGENCY    Narrative:     Preferred Collection Type->Urine, Clean Catch   NT-PRO NATRIURETIC PEPTIDE   URINALYSIS MICROSCOPIC    Narrative:     Preferred Collection Type->Urine, Clean Catch   PROTIME-INR   PROTIME-INR         Results for orders placed or performed during the hospital encounter of 09/06/19   CBC auto differential   Result Value Ref Range    WBC 8.45 3.90 - 12.70 K/uL    RBC 5.20 4.60 - 6.20 M/uL    Hemoglobin 13.8 (L) 14.0 - 18.0 g/dL    Hematocrit 41.4 40.0 - 54.0 %    Mean Corpuscular Volume 80 (L) 82 - 98 fL    Mean Corpuscular Hemoglobin 26.5 (L) 27.0 - 31.0 pg    Mean Corpuscular Hemoglobin Conc 33.3 32.0 - 36.0 g/dL    RDW 15.7 (H) 11.5 - 14.5 %    Platelets 203 150 - 350 K/uL    MPV 11.5 9.2 - 12.9 fL    Gran # (ANC) 1.9 1.8 - 7.7 K/uL    Lymph # 5.0 (H) 1.0 - 4.8 K/uL    Mono # 0.9 0.3 - 1.0 K/uL    Eos # 0.4 0.0 - 0.5 K/uL    Baso # 0.20 0.00 - 0.20 K/uL    Gran% 22.2 (L) 38.0 - 73.0 %    Lymph% 59.3 (H) 18.0 - 48.0 %    Mono% 11.0 4.0 - 15.0 %    Eosinophil% 5.1 0.0 - 8.0 %    Basophil% 2.4 (H) 0.0 - 1.9 %    Differential  Method Automated    Comprehensive metabolic panel   Result Value Ref Range    Sodium 143 136 - 145 mmol/L    Potassium 2.7 (LL) 3.5 - 5.1 mmol/L    Chloride 107 95 - 110 mmol/L    CO2 29 23 - 29 mmol/L    Glucose 194 (H) 70 - 110 mg/dL    BUN, Bld 16 2 - 20 mg/dL    Creatinine 1.18 0.50 - 1.40 mg/dL    Calcium 8.8 8.7 - 10.5 mg/dL    Total Protein 7.7 6.0 - 8.4 g/dL    Albumin 4.1 3.5 - 5.2 g/dL    Total Bilirubin 0.4 0.1 - 1.0 mg/dL    Alkaline Phosphatase 84 38 - 126 U/L    AST 27 15 - 46 U/L    ALT 20 10 - 44 U/L    Anion Gap 7 (L) 8 - 16 mmol/L    eGFR if African American >60.0 >60 mL/min/1.73 m^2    eGFR if non African American >60.0 >60 mL/min/1.73 m^2   Troponin I #1   Result Value Ref Range    Troponin I 0.024 0.012 - 0.034 ng/mL   Urinalysis, Reflex to Urine Culture Urine, Clean Catch   Result Value Ref Range    Specimen UA Urine, Clean Catch     Color, UA Straw Yellow, Straw, Josie    Appearance, UA Clear Clear    pH, UA 7.0 5.0 - 8.0    Specific Gravity, UA 1.010 1.005 - 1.030    Protein, UA 2+ (A) Negative    Glucose, UA Negative Negative    Ketones, UA Negative Negative    Bilirubin (UA) Negative Negative    Occult Blood UA Trace (A) Negative    Nitrite, UA Negative Negative    Urobilinogen, UA Negative <2.0 EU/dL    Leukocytes, UA Negative Negative   Ethanol   Result Value Ref Range    Alcohol, Medical, Serum <10 <10 mg/dL   Drug screen panel, emergency   Result Value Ref Range    Benzodiazepines Negative     Methadone metabolites Negative     Cocaine (Metab.) Negative     Opiate Scrn, Ur Negative     Barbiturate Screen, Ur Negative     Amphetamine Screen, Ur Negative     THC Negative     Phencyclidine Negative     Creatinine, Random Ur 36.2 23.0 - 375.0 mg/dL    Toxicology Information SEE COMMENT    NT-Pro Natriuretic Peptide   Result Value Ref Range    NT-proBNP 175 5 - 900 pg/mL   Urinalysis Microscopic   Result Value Ref Range    RBC, UA 0 0 - 4 /hpf    WBC, UA 0 0 - 5 /hpf    Bacteria None  None-Occ /hpf    Non-Squam Epith CANCELED /hpf    Hyaline Casts, UA 0 0-1/lpf /lpf    Microscopic Comment SEE COMMENT    Protime-INR   Result Value Ref Range    Prothrombin Time 11.7 9.0 - 12.5 sec    INR 1.1 0.8 - 1.2   Potassium   Result Value Ref Range    Potassium 2.8 (L) 3.5 - 5.1 mmol/L   Hemoglobin A1c   Result Value Ref Range    Hemoglobin A1C 5.9 (H) 4.0 - 5.6 %    Estimated Avg Glucose 123 68 - 131 mg/dL   Hemoglobin A1c if not done in past 3 months   Result Value Ref Range    Hemoglobin A1C 5.9 (H) 4.0 - 5.6 %    Estimated Avg Glucose 123 68 - 131 mg/dL   Troponin I   Result Value Ref Range    Troponin I 0.053 (H) 0.000 - 0.026 ng/mL   RPR   Result Value Ref Range    RPR Non-reactive Non-reactive   Vitamin B12   Result Value Ref Range    Vitamin B-12 416 210 - 950 pg/mL   Folate   Result Value Ref Range    Folate 6.1 4.0 - 24.0 ng/mL   Basic Metabolic Panel (BMP)   Result Value Ref Range    Sodium 142 136 - 145 mmol/L    Potassium 3.2 (L) 3.5 - 5.1 mmol/L    Chloride 107 95 - 110 mmol/L    CO2 28 23 - 29 mmol/L    Glucose 87 70 - 110 mg/dL    BUN, Bld 14 8 - 23 mg/dL    Creatinine 1.1 0.5 - 1.4 mg/dL    Calcium 8.6 (L) 8.7 - 10.5 mg/dL    Anion Gap 7 (L) 8 - 16 mmol/L    eGFR if African American >60 >60 mL/min/1.73 m^2    eGFR if non African American >60 >60 mL/min/1.73 m^2   CBC auto differential   Result Value Ref Range    WBC 8.11 3.90 - 12.70 K/uL    RBC 4.81 4.60 - 6.20 M/uL    Hemoglobin 12.7 (L) 14.0 - 18.0 g/dL    Hematocrit 38.8 (L) 40.0 - 54.0 %    Mean Corpuscular Volume 81 (L) 82 - 98 fL    Mean Corpuscular Hemoglobin 26.4 (L) 27.0 - 31.0 pg    Mean Corpuscular Hemoglobin Conc 32.7 32.0 - 36.0 g/dL    RDW 15.6 (H) 11.5 - 14.5 %    Platelets 214 150 - 350 K/uL    MPV 11.6 9.2 - 12.9 fL    Gran # (ANC) 4.5 1.8 - 7.7 K/uL    Lymph # 2.6 1.0 - 4.8 K/uL    Mono # 0.8 0.3 - 1.0 K/uL    Eos # 0.2 0.0 - 0.5 K/uL    Baso # 0.02 0.00 - 0.20 K/uL    Gran% 55.5 38.0 - 73.0 %    Lymph% 31.8 18.0 - 48.0 %     Mono% 9.7 4.0 - 15.0 %    Eosinophil% 2.8 0.0 - 8.0 %    Basophil% 0.2 0.0 - 1.9 %    Differential Method Automated    Basic metabolic panel   Result Value Ref Range    Sodium 142 136 - 145 mmol/L    Potassium 3.2 (L) 3.5 - 5.1 mmol/L    Chloride 107 95 - 110 mmol/L    CO2 28 23 - 29 mmol/L    Glucose 87 70 - 110 mg/dL    BUN, Bld 14 8 - 23 mg/dL    Creatinine 1.1 0.5 - 1.4 mg/dL    Calcium 8.6 (L) 8.7 - 10.5 mg/dL    Anion Gap 7 (L) 8 - 16 mmol/L    eGFR if African American >60 >60 mL/min/1.73 m^2    eGFR if non African American >60 >60 mL/min/1.73 m^2   Magnesium   Result Value Ref Range    Magnesium 1.8 1.6 - 2.6 mg/dL   Phosphorus   Result Value Ref Range    Phosphorus 2.3 (L) 2.7 - 4.5 mg/dL   Lipid panel   Result Value Ref Range    Cholesterol 188 120 - 199 mg/dL    Triglycerides 135 30 - 150 mg/dL    HDL 34 (L) 40 - 75 mg/dL    LDL Cholesterol 127.0 63.0 - 159.0 mg/dL    Hdl/Cholesterol Ratio 18.1 (L) 20.0 - 50.0 %    Total Cholesterol/HDL Ratio 5.5 (H) 2.0 - 5.0    Non-HDL Cholesterol 154 mg/dL   Basic Metabolic Panel (BMP)   Result Value Ref Range    Sodium 140 136 - 145 mmol/L    Potassium 3.5 3.5 - 5.1 mmol/L    Chloride 106 95 - 110 mmol/L    CO2 26 23 - 29 mmol/L    Glucose 106 70 - 110 mg/dL    BUN, Bld 14 8 - 23 mg/dL    Creatinine 1.1 0.5 - 1.4 mg/dL    Calcium 8.9 8.7 - 10.5 mg/dL    Anion Gap 8 8 - 16 mmol/L    eGFR if African American >60 >60 mL/min/1.73 m^2    eGFR if non African American >60 >60 mL/min/1.73 m^2   CBC auto differential   Result Value Ref Range    WBC 5.77 3.90 - 12.70 K/uL    RBC 5.23 4.60 - 6.20 M/uL    Hemoglobin 14.1 14.0 - 18.0 g/dL    Hematocrit 41.9 40.0 - 54.0 %    Mean Corpuscular Volume 80 (L) 82 - 98 fL    Mean Corpuscular Hemoglobin 27.0 27.0 - 31.0 pg    Mean Corpuscular Hemoglobin Conc 33.7 32.0 - 36.0 g/dL    RDW 15.5 (H) 11.5 - 14.5 %    Platelets 208 150 - 350 K/uL    MPV 11.1 9.2 - 12.9 fL    Gran # (ANC) 3.2 1.8 - 7.7 K/uL    Lymph # 1.6 1.0 - 4.8 K/uL     Mono # 0.7 0.3 - 1.0 K/uL    Eos # 0.3 0.0 - 0.5 K/uL    Baso # 0.03 0.00 - 0.20 K/uL    Gran% 55.4 38.0 - 73.0 %    Lymph% 27.0 18.0 - 48.0 %    Mono% 11.6 4.0 - 15.0 %    Eosinophil% 5.5 0.0 - 8.0 %    Basophil% 0.5 0.0 - 1.9 %    Differential Method Automated    Basic metabolic panel   Result Value Ref Range    Sodium 140 136 - 145 mmol/L    Potassium 3.5 3.5 - 5.1 mmol/L    Chloride 106 95 - 110 mmol/L    CO2 26 23 - 29 mmol/L    Glucose 106 70 - 110 mg/dL    BUN, Bld 14 8 - 23 mg/dL    Creatinine 1.1 0.5 - 1.4 mg/dL    Calcium 8.9 8.7 - 10.5 mg/dL    Anion Gap 8 8 - 16 mmol/L    eGFR if African American >60 >60 mL/min/1.73 m^2    eGFR if non African American >60 >60 mL/min/1.73 m^2   Magnesium   Result Value Ref Range    Magnesium 1.8 1.6 - 2.6 mg/dL   Phosphorus   Result Value Ref Range    Phosphorus 2.2 (L) 2.7 - 4.5 mg/dL   TSH   Result Value Ref Range    TSH 0.828 0.400 - 4.000 uIU/mL   Echo Color Flow Doppler? Yes   Result Value Ref Range    AV mean gradient 7 mmHg    Ao peak gabino 1.69 m/s    Ao VTI 31.66 cm    IVRT 0.08 msec    IVS 2.00 (A) 0.6 - 1.1 cm    LA size 4.53 cm    LVIDD 4.64 3.5 - 6.0 cm    LVIDS 2.42 2.1 - 4.0 cm    LVOT diameter 1.86 cm    LVOT peak VTI 24.61 cm    PW 1.90 (A) 0.6 - 1.1 cm    MV Peak A Gabino 1.03 m/s    E wave decelartion time 118.67 msec    MV Peak E Gabino 0.79 m/s    PV Peak D Gabino 0.34 m/s    PV Peak S Gabino 0.28 m/s    RVDD 3.09 cm    TR Max Gabino 1.71 m/s    Ao root annulus 2.60 cm    AORTIC VALVE CUSP SEPERATION 1.57 cm    PV PEAK VELOCITY 1.28 cm/s    LV Diastolic Volume 99.58 mL    LV Systolic Volume 20.67 mL    LVOT peak gabino 1.35 m/s    FS 48 %    LV mass 435.68 g    Left Ventricle Relative Wall Thickness 0.82 cm    AV valve area 2.11 cm2    AV Velocity Ratio 0.80     AV index (prosthetic) 0.78     E/A ratio 0.77     Pulm vein S/D ratio 0.82     LVOT area 2.7 cm2    LVOT stroke volume 66.84 cm3    AV peak gradient 11 mmHg    LV Systolic Volume Index 9.4 mL/m2    LV Diastolic  Volume Index 45.21 mL/m2    LV Mass Index 198 g/m2    Triscuspid Valve Regurgitation Peak Gradient 12 mmHg    BSA 2.27 m2    Right Atrial Pressure (from IVC) 3 mmHg    TV rest pulmonary artery pressure 15 mmHg   POCT glucose   Result Value Ref Range    POCT Glucose 224 (H) 70 - 110 mg/dL   POCT glucose   Result Value Ref Range    POCT Glucose 112 (H) 70 - 110 mg/dL   POCT glucose   Result Value Ref Range    POCT Glucose 242 (H) 70 - 110 mg/dL   POCT glucose   Result Value Ref Range    POCT Glucose 123 (H) 70 - 110 mg/dL   POCT glucose   Result Value Ref Range    POCT Glucose 105 70 - 110 mg/dL   POCT glucose   Result Value Ref Range    POCT Glucose 110 70 - 110 mg/dL   POCT glucose   Result Value Ref Range    POCT Glucose 86 70 - 110 mg/dL   POCT glucose   Result Value Ref Range    POCT Glucose 87 70 - 110 mg/dL   POCT glucose   Result Value Ref Range    POCT Glucose 93 70 - 110 mg/dL   POCT glucose   Result Value Ref Range    POCT Glucose 132 (H) 70 - 110 mg/dL       ECG Results          EKG 12-lead (Final result)  Result time 09/06/19 12:13:01    Final result by Interface, Lab In [facility] (09/06/19 12:13:01)                 Narrative:    Test Reason : R07.9,    Vent. Rate : 084 BPM     Atrial Rate : 084 BPM     P-R Int : 184 ms          QRS Dur : 100 ms      QT Int : 386 ms       P-R-T Axes : 085 083 078 degrees     QTc Int : 456 ms    Normal sinus rhythm  Septal infarct (cited on or before 01-NOV-2011)  Lateral infarct ,age undetermined  Abnormal ECG  When compared with ECG of 28-OCT-2018 22:38,  Lateral infarct is now Present  Confirmed by Jean Diaz MD ([id number]) on 9/6/2019 12:12:50 PM    Referred By: [email or web address]   SELF           Confirmed By:Jean Diaz MD                            Imaging Results          CT Head Without Contrast (Final result)  Result time 09/06/19 07:41:04    Final result by Jean Graves MD (09/06/19 07:41:04)                 Impression:      No acute abnormality.    All CT scans  at this facility use dose modulation, iterative reconstruction, and/or weight based dosing when appropriate to reduce radiation dose to as low as reasonable achievable.      Electronically signed by: Jean Graves MD  Date:    09/06/2019  Time:    07:41             Narrative:    EXAMINATION:  CT HEAD WITHOUT CONTRAST    CLINICAL HISTORY:  Dizziness;    TECHNIQUE:  Low dose axial CT images obtained throughout the head without intravenous contrast. Sagittal and coronal reconstructions were performed.    All CT scans at this facility use dose modulation, iterative reconstruction, and/or weight based dosing when appropriate to reduce radiation dose to as low as reasonable achievable.    COMPARISON:  10/29/2018    FINDINGS:  Intracranial compartment:    The brain parenchyma appears normal. No parenchymal mass, hemorrhage, edema or major vascular distribution infarct.    Ventricles and sulci are normal in size for age without evidence of hydrocephalus.    No extra-axial blood or fluid collections.    Skull/extracranial contents (limited evaluation): No fracture.  Patchy ethmoid opacification.  Remote fracture of the medial wall of the left orbit.  Mastoid air cells and paranasal sinuses are essentially clear.                               X-Ray Chest AP Portable (Final result)  Result time 09/06/19 08:08:07    Final result by MACY Lincoln Sr., MD (09/06/19 08:08:07)                 Impression:      1. The lungs are clear.  2. There is mild cardiomegaly.  .      Electronically signed by: Nitish Lincoln MD  Date:    09/06/2019  Time:    08:08             Narrative:    EXAMINATION:  XR CHEST AP PORTABLE    CLINICAL HISTORY:  Chest Pain;    COMPARISON:  10/28/2018    FINDINGS:  There is mild cardiomegaly.  The lungs are clear. There is no pneumothorax.  The costophrenic angles are sharp.                              X-Rays:   Independently Interpreted Readings:   Other Readings:  CT head shows no acute process  Chest  x-ray shows no acute process    Medical Decision Making:   Clinical Tests:   Lab Tests: Ordered  Radiological Study: Ordered  Medical Tests: Ordered  Other:   I have discussed this case with another health care provider.       <> Summary of the Discussion: Dr. Waggoner on-call for Neurology evaluated patient.  Nothing acute was seen on CT scan but Dr. Waggoner but patient warranted emergent MRI to rule out brainstem infarct.  Discussed patient with Neurology Dr. Waggoner and CT head shows no acute process.  Dr. Waggoner however felt that patient warranted MRI of the head to rule out a brainstem infarct.  Dr. Waggoner was okay with patient going to a tele floor at [facility].  Discussed patient with [facility] resident on-call for Dr Celena Lorenzo who accepts patient for admission to their service.                      Clinical Impression:       ICD-10-CM ICD-9-CM   1. Altered mental status, unspecified altered mental status type R41.82 780.97   2. Chest pain R07.9 786.50   3. Vertigo R42 780.4   4. Malignant hypertension I10 401.0   5. Hypertensive urgency I16.0 401.9   6. Stroke-like symptoms R29.90 781.99   7. Acute cerebral infarction I63.9 434.91                                Steven Mast MD  09/06/19 0533       Steven Mast MD  09/23/19 0520

## 2019-09-06 NOTE — HPI
"59 y.o. Male with past medical history of DM type 2, hyperlipidemia, HTN and CVA presents with dizziness that started yesterday night and has been persistent.  Patient states symptoms were similar to past CVA so he was very concerned and came to ED.  Reports waking up in the middle of the night,  feeling "funny" and when sitting all the way up, felt dizzy and "off balance."  Patient reports associated nausea and vomiting x1 episode NBNB.  Denies fever, chills, SOB, cough, chest pain, neck stiffness or any other focal deficits. He reports dizziness at rest that worsens when moving.  Denies aggravating or alleviating factors.  Findings: K+ 2.7, U/A negative, CT of head negative for acute changes, chest x-ray negative for acute changes, EKG negative for ischemic changes.  Patient admitted for further medical management.  "

## 2019-09-06 NOTE — PLAN OF CARE
Problem: Adult Inpatient Plan of Care  Goal: Patient-Specific Goal (Individualization)  Outcome: Ongoing (interventions implemented as appropriate)     09/06/19 8606   OTHER   Anxieties, Fears or Concerns None @ this time.   Individualized Care Needs Patient safety, cardiac monitoring.   Patient-Specific Goal (Individualization)   Patient-Specific Goals (Include Timeframe) Reduce dizziness.     POC reviewed with patient; verbalizes understanding. Compliant with medication administration regimen. Pleasant. Strength increased throughout day. Patient able to walk to and from bathroom with 1-person assist. Safety precautions in place; call light within reach, bed in low position, wheels locked, side rails up x2, bed alarm ON. Will continue to monitor.

## 2019-09-06 NOTE — PROGRESS NOTES
Pharmacy New Medication Education    Patient and/or Caregiver ACCEPTED medication education.    Pharmacy has provided education on the name, indication, and possible side effects of the medication(s) prescribed, using teach-back method.     Learners of pharmacy medication education includes:  patient    Medication Indication Side Effects   hydralzine HTN dizziness and headache   Potassium chloride hypokalemia nausea and GI distress       The following medications have also been discussed, during this admission.     Current Facility-Administered Medications   Medication Frequency    acetaminophen tablet 650 mg Q4H PRN    dextrose 10% (D10W) Bolus PRN    dextrose 10% (D10W) Bolus PRN    glucagon (human recombinant) injection 1 mg PRN    glucose chewable tablet 16 g PRN    glucose chewable tablet 24 g PRN    hydrALAZINE injection 10 mg ED 1 Time    influenza (QUADRIVALENT PF) vaccine 0.5 mL Prior to discharge    insulin aspart U-100 pen 0-5 Units QID (AC + HS) PRN    ondansetron injection 4 mg Q8H PRN    potassium chloride SA CR tablet 40 mEq BID    sodium chloride 0.9% flush 10 mL PRN          Thank you  Thee Anderson, PharmD

## 2019-09-06 NOTE — SUBJECTIVE & OBJECTIVE
Interval History: Resting in bed, reviewed plan of care, in agreement.    Review of Systems   Constitutional: Negative for chills and fatigue.   HENT: Negative for congestion, postnasal drip and rhinorrhea.    Eyes: Negative for visual disturbance.   Respiratory: Negative for chest tightness and shortness of breath.    Cardiovascular: Negative for chest pain.   Gastrointestinal: Positive for nausea. Negative for abdominal distention and abdominal pain.   Genitourinary: Negative for difficulty urinating.   Musculoskeletal: Negative for arthralgias and myalgias.   Skin: Negative for color change.   Neurological: Positive for dizziness. Negative for weakness.   Hematological: Does not bruise/bleed easily.   Psychiatric/Behavioral: Negative for agitation.     Objective:     Vital Signs (Most Recent):  Temp: 97.7 °F (36.5 °C) (09/06/19 1117)  Pulse: 78 (09/06/19 1117)  Resp: 20 (09/06/19 1117)  BP: (!) 171/96 (09/06/19 1117)  SpO2: 99 % (09/06/19 0611) Vital Signs (24h Range):  Temp:  [96.8 °F (36 °C)-98 °F (36.7 °C)] 97.7 °F (36.5 °C)  Pulse:  [] 78  Resp:  [19-30] 20  SpO2:  [92 %-99 %] 99 %  BP: (164-247)/() 171/96     Weight: 105.5 kg (232 lb 9.4 oz)  Body mass index is 34.35 kg/m².    Intake/Output Summary (Last 24 hours) at 9/6/2019 1227  Last data filed at 9/6/2019 1000  Gross per 24 hour   Intake --   Output 600 ml   Net -600 ml      Physical Exam   Constitutional: He is oriented to person, place, and time. He appears well-developed and well-nourished.   HENT:   Head: Normocephalic and atraumatic.   Eyes: EOM are normal.   Neck: Normal range of motion. Neck supple.   Cardiovascular: Normal rate.   Pulmonary/Chest: Effort normal and breath sounds normal.   Abdominal: Soft. Bowel sounds are normal.   Musculoskeletal: Normal range of motion.   Neurological: He is alert and oriented to person, place, and time.   Skin: Skin is warm and dry.   Psychiatric: He has a normal mood and affect.       Significant  Labs:     BMP:   Recent Labs   Lab 09/06/19  0403 09/06/19  0916   *  --      --    K 2.7* 2.8*     --    CO2 29  --    BUN 16  --    CREATININE 1.18  --    CALCIUM 8.8  --      CBC:   Recent Labs   Lab 09/06/19 0401   WBC 8.45   HGB 13.8*   HCT 41.4        CMP:   Recent Labs   Lab 09/06/19 0403 09/06/19 0916     --    K 2.7* 2.8*     --    CO2 29  --    *  --    BUN 16  --    CREATININE 1.18  --    CALCIUM 8.8  --    PROT 7.7  --    ALBUMIN 4.1  --    BILITOT 0.4  --    ALKPHOS 84  --    AST 27  --    ALT 20  --    ANIONGAP 7*  --    EGFRNONAA >60.0  --      Magnesium: No results for input(s): MG in the last 48 hours.  Urine Studies:   Recent Labs   Lab 09/06/19 0403   COLORU Straw   APPEARANCEUA Clear   PHUR 7.0   SPECGRAV 1.010   PROTEINUA 2+*   GLUCUA Negative   KETONESU Negative   BILIRUBINUA Negative   OCCULTUA Trace*   NITRITE Negative   UROBILINOGEN Negative   LEUKOCYTESUR Negative   RBCUA 0   WBCUA 0   BACTERIA None   HYALINECASTS 0       Significant Imaging: I have reviewed all pertinent imaging results/findings within the past 24 hours.

## 2019-09-06 NOTE — CONSULTS
[facility]  Vascular Neurology  Comprehensive Stroke Center  Tele-Consultation Note      Consults    Consulting Provider: JONNY JACOBS  Current Providers  No providers found    Patient Location:  [facility] EMERGENCY DEPARTMENT Emergency Department  Spoke hospital nurse at bedside with patient assisting consultant.     Patient information was obtained from patient and EMS personnel.         Assessment/Plan:     STROKE DOCUMENTATION     Acute Stroke Times:   Acute Stroke Times   Last Known Normal Date: 09/06/19  Last Known Normal Time: 0200  Symptom Onset Date: 09/06/19  Symptom Onset Time: 0200  Stroke Team Called Date: 09/06/19  Stroke Team Called Time: 0435  Stroke Team Arrival Date: 09/06/19  Stroke Team Arrival Time: 0440  CT Interpretation Time: 0440    NIH Scale:  Interval: baseline  1a. Level of Consciousness: 1-->Not alert, but arousable by minor stimulation to obey, answer, or respond  1b. LOC Questions: 0-->Answers both questions correctly  1c. LOC Commands: 0-->Performs both tasks correctly  2. Best Gaze: 0-->Normal  3. Visual: 0-->No visual loss  4. Facial Palsy: 0-->Normal symmetrical movements  5a. Motor Arm, Left: 1-->Drift, limb holds 90 (or 45) degrees, but drifts down before full 10 seconds, does not hit bed or other support  5b. Motor Arm, Right: 0-->No drift, limb holds 90 (or 45) degrees for full 10 secs  6a. Motor Leg, Left: 1-->Drift, leg falls by the end of the 5-sec period but does not hit bed  6b. Motor Leg, Right: 0-->No drift, leg holds 30 degree position for full 5 secs  7. Limb Ataxia: 0-->Absent  8. Sensory: 0-->Normal, no sensory loss  9. Best Language: 0-->No aphasia, normal  10. Dysarthria: 1-->Mild-to-moderate dysarthria, patient slurs at least some words and, at worst, can be understood with some difficulty  11. Extinction and Inattention (formerly Neglect): 0-->No abnormality  Total (NIH Stroke Scale): 4     Modified White Score: 2  Carlin  "Coma Scale:    ABCD2 Score:    [id number] Score:   HAS -BLED Score:   ICH Score:   Hunt & Logan Classification:       Diagnoses:   Non-intractable vomiting with nausea  Non-intractable vomiting with nausea without evidence of new neurological findings on exam. Doubt stroke now. Probably related to malignant hypertension. Admit to [place] and get MRI brain.        Blood pressure (!) 185/82, pulse 89, temperature 98 °F (36.7 °C), temperature source Oral, resp. rate (!) 28, height 5' 9" (1.753 m), weight 77.4 kg (170 lb 9.6 oz), SpO2 (!) 93 %.  Alteplase Eligible?: No  Alteplase Recommendation: Alteplase not recommended due to minimal deficit   Possible Interventional Revascularization Candidate? No; No ischemic penumbra    Disposition Recommendation: transfer to system sub-[facility] by  ground  standard    Subjective:     History of Present Illness:  58 y/o male LKN 0200 when he developed lightheadedness, nausea and emesis. Stroke 3 months ago with residual left hemiparesis. Homeless. BP on arrival to ED was 242/111 but increased to 252/165. Treated with Hydralazine 20 mg IV.      Woke up with symptoms?: no    Recent bleeding noted: no  Does the patient take any Blood Thinners? no  Medications: Antiplatelets:  aspirin      Past Medical History: hypertension, diabetes, hyperlipidemia and stroke    Past Surgical History: any recent surgeries (within last month) left wrist surgery    Family History: no relevant history    Social History: no smoking, no drinking, no drugs    Allergies: Pneumococcal 23-Yun Ps Vaccine     Review of Systems   Neurological: Positive for speech difficulty and weakness.   All other systems reviewed and are negative.    Objective:   Vitals: Blood pressure (!) 185/82, pulse 89, temperature 98 °F (36.7 °C), temperature source Oral, resp. rate (!) 28, height 5' 9" (1.753 m), weight 77.4 kg (170 lb 9.6 oz), SpO2 (!) 93 %.     CT READ: Yes  No hemmorhage. No mass effect. No early infarct signs. "     Physical Exam   Constitutional: He is oriented to person, place, and time. He appears well-developed and well-nourished.   HENT:   Head: Normocephalic and atraumatic.   Eyes: Pupils are equal, round, and reactive to light. EOM are normal.   Cardiovascular: Normal rate and regular rhythm.   Pulmonary/Chest: Effort normal.   Neurological: He is oriented to person, place, and time.   Vitals reviewed.            Recommended the emergency room physician to have a brief discussion with the patient and/or family if available regarding the risks and benefits of treatment, and to briefly document the occurrence of that discussion in his clinical encounter note.     The attending portion of this evaluation, treatment, and documentation was performed per Idalia Waggoner MD via audiovisual.    Billing code:  (non-intervention mild to moderate stroke, TIA, some mimics)    · This patient has a critical neurological condition/illness, with some potential for high morbidity and mortality.  · There is a moderate probability for acute neurological change leading to clinical and possibly life-threatening deterioration requiring highest level of physician preparedness for urgent intervention.  · Care was coordinated with other physicians involved in the patient's care.  · Radiologic studies and laboratory data were reviewed and interpreted, and plan of care was re-assessed based on the results.  · Diagnosis, treatment options and prognosis may have been discussed with the patient and/or family members or caregiver.      In your opinion, this was a: Tier 1 N/A    Consult End Time: 5:03 AM     Idalia Waggoner MD  Comprehensive Stroke Center  Vascular Neurology   [facility]

## 2019-09-06 NOTE — H&P
"[facility] Hospital Medicine  History & Physical    Patient Name: Steven Shields  MRN: [id number]  Admission Date: 9/6/2019  Attending Physician: Sandeep Carlos DO   Primary Care Provider: Julian Carson MD         Patient information was obtained from spouse/SO, caregiver / friend, past medical records and ER records.     Subjective:     Principal Problem:Hypertensive urgency    Chief Complaint:   Chief Complaint   Patient presents with    Dizziness     PT found by EMS sitting in chair with complaints of dizziness, blurred vision, and emesis. Patient stated symptoms started at 2am this morning. Patient stated he woke up with dizziness. Patient has no numbness or tingling. No new deficits per patient.         HPI: 59 y.o. Male with past medical history of DM type 2, hyperlipidemia, HTN and CVA presents with dizziness that started yesterday night and has been persistent.  Patient states symptoms were similar to past CVA so he was very concerned and came to ED.  Reports waking up in the middle of the night,  feeling "funny" and when sitting all the way up, felt dizzy and "off balance."  Patient reports associated nausea and vomiting x1 episode NBNB.  Denies fever, chills, SOB, cough, chest pain, neck stiffness or any other focal deficits. He reports dizziness at rest that worsens when moving.  Denies aggravating or alleviating factors.  Findings: K+ 2.7, U/A negative, CT of head negative for acute changes, chest x-ray negative for acute changes, EKG negative for ischemic changes.  Patient admitted for further medical management.    Interval History: Resting in bed, reviewed plan of care, in agreement.    Review of Systems   Constitutional: Negative for chills and fatigue.   HENT: Negative for congestion, postnasal drip and rhinorrhea.    Eyes: Negative for visual disturbance.   Respiratory: Negative for chest tightness and shortness of breath.    Cardiovascular: Negative for chest pain. "   Gastrointestinal: Positive for nausea. Negative for abdominal distention and abdominal pain.   Genitourinary: Negative for difficulty urinating.   Musculoskeletal: Negative for arthralgias and myalgias.   Skin: Negative for color change.   Neurological: Positive for dizziness. Negative for weakness.   Hematological: Does not bruise/bleed easily.   Psychiatric/Behavioral: Negative for agitation.     Objective:     Vital Signs (Most Recent):  Temp: 97.7 °F (36.5 °C) (09/06/19 1117)  Pulse: 78 (09/06/19 1117)  Resp: 20 (09/06/19 1117)  BP: (!) 171/96 (09/06/19 1117)  SpO2: 99 % (09/06/19 0611) Vital Signs (24h Range):  Temp:  [96.8 °F (36 °C)-98 °F (36.7 °C)] 97.7 °F (36.5 °C)  Pulse:  [] 78  Resp:  [19-30] 20  SpO2:  [92 %-99 %] 99 %  BP: (164-247)/() 171/96     Weight: 105.5 kg (232 lb 9.4 oz)  Body mass index is 34.35 kg/m².    Intake/Output Summary (Last 24 hours) at 9/6/2019 1227  Last data filed at 9/6/2019 1000  Gross per 24 hour   Intake --   Output 600 ml   Net -600 ml      Physical Exam   Constitutional: He is oriented to person, place, and time. He appears well-developed and well-nourished.   HENT:   Head: Normocephalic and atraumatic.   Eyes: EOM are normal.   Neck: Normal range of motion. Neck supple.   Cardiovascular: Normal rate.   Pulmonary/Chest: Effort normal and breath sounds normal.   Abdominal: Soft. Bowel sounds are normal.   Musculoskeletal: Normal range of motion.   Neurological: He is alert and oriented to person, place, and time.   Skin: Skin is warm and dry.   Psychiatric: He has a normal mood and affect.       Significant Labs:     BMP:   Recent Labs   Lab 09/06/19  0403 09/06/19  0916   *  --      --    K 2.7* 2.8*     --    CO2 29  --    BUN 16  --    CREATININE 1.18  --    CALCIUM 8.8  --      CBC:   Recent Labs   Lab 09/06/19  0401   WBC 8.45   HGB 13.8*   HCT 41.4        CMP:   Recent Labs   Lab 09/06/19  0403 09/06/19  0916     --    K 2.7*  2.8*     --    CO2 29  --    *  --    BUN 16  --    CREATININE 1.18  --    CALCIUM 8.8  --    PROT 7.7  --    ALBUMIN 4.1  --    BILITOT 0.4  --    ALKPHOS 84  --    AST 27  --    ALT 20  --    ANIONGAP 7*  --    EGFRNONAA >60.0  --      Magnesium: No results for input(s): MG in the last 48 hours.  Urine Studies:   Recent Labs   Lab 09/06/19  0403   COLORU Straw   APPEARANCEUA Clear   PHUR 7.0   SPECGRAV 1.010   PROTEINUA 2+*   GLUCUA Negative   KETONESU Negative   BILIRUBINUA Negative   OCCULTUA Trace*   NITRITE Negative   UROBILINOGEN Negative   LEUKOCYTESUR Negative   RBCUA 0   WBCUA 0   BACTERIA None   HYALINECASTS 0       Significant Imaging: I have reviewed all pertinent imaging results/findings within the past 24 hours.    Assessment/Plan:     * Hypertensive urgency  Dizziness  History of stroke  Hyperlipidemia  Hypertension  Non-intractable vomiting with nausea    -Continuous cardiac monitoring   --164  -Troponin: WNL  -Vascular Sx consulted while patient was in ED, per note, non-intractable vomiting with nausea without evidence of new neurological findings on exam. Doubt stroke now. Probably related to malignant hypertension. Admit to [place] and get MRI brain.  -continue aspirin  -CT of head: negative  -MRI: pending  -MRA of head and Neck: pending  -Permissive HTN until after MRI & stroke ruled out  -2D echo: pending  -Neuro checks Q4hrs  -Lipid Panel: pending  -prn antiemetics      Type 2 diabetes mellitus  Hemoglobin A1c 5.9  Current BS: 194  POC glucose checks ac meals and nightly  Continue home Detemir at decreased dose: 10 units nightly  Low dose SSI PRN  Hypoglycemia protocol PRN  Hold oral hypoglycemic agents  Diabetic Diet  LDL: pending  Patient states he was taken off statin by PCP      Hypokalemia  Replace  Monitor        VTE Risk Mitigation (From admission, onward)        Ordered     IP VTE LOW RISK PATIENT  Once      09/06/19 0750     Place sequential compression device   Until discontinued      09/06/19 4694             Felicity Bedolla NP  Department of Hospital Medicine   [facility]

## 2019-09-06 NOTE — ED NOTES
Patient woke up dry heaving. No emesis noted in bag. Patient stated he still feels dizzy at this time. Patient offered urinal and was adjusted in bed. Will continue to monitor patient.

## 2019-09-06 NOTE — NURSING
Patient c/o CP. VS taken; elevated BP. Patient asymptomatic; resting in bed with eyes closed. No s/sx of pain/distress noted. Responds to verbal stimulation. Able to take medications, autonomously. Dr. Carlos notified; states will come up to see patient. EKG STAT and Troponin ordered. Lab retrieving blood for labs. Will continue to monitor.

## 2019-09-06 NOTE — HPI
60 y/o male LKN 0200 when he developed lightheadedness, nausea and emesis. Stroke 3 months ago with residual left hemiparesis. Homeless. BP on arrival to ED was 242/111 but increased to 252/165. Treated with Hydralazine 20 mg IV.

## 2019-09-06 NOTE — ASSESSMENT & PLAN NOTE
Dizziness-resolved  History of stroke  Hyperlipidemia  Hypertension  Non-intractable vomiting with nausea-resolved    -Continuous cardiac monitoring   --118  -Troponin: WNL  -Vascular Sx consulted while patient was in ED, per note, non-intractable vomiting with nausea without evidence of new neurological findings on exam. Doubt stroke now. Probably related to malignant hypertension. Admit to [place] and get MRI brain.  -continue aspirin  -CT of head: negative  -MRI: Suggestive of acute infarct  -MRA of head and Neck: No blockages  -continue home antihypertensives  -Hydralazine prn  -2D echo: EF 65%  -Neuro checks Q4hrs  -Lipid Panel: 127  -prn antiemetics

## 2019-09-06 NOTE — SUBJECTIVE & OBJECTIVE
"  Woke up with symptoms?: no    Recent bleeding noted: no  Does the patient take any Blood Thinners? no  Medications: Antiplatelets:  aspirin      Past Medical History: hypertension, diabetes, hyperlipidemia and stroke    Past Surgical History: any recent surgeries (within last month) left wrist surgery    Family History: no relevant history    Social History: no smoking, no drinking, no drugs    Allergies: Pneumococcal 23-Yun Ps Vaccine     Review of Systems   Neurological: Positive for speech difficulty and weakness.   All other systems reviewed and are negative.    Objective:   Vitals: Blood pressure (!) 185/82, pulse 89, temperature 98 °F (36.7 °C), temperature source Oral, resp. rate (!) 28, height 5' 9" (1.753 m), weight 77.4 kg (170 lb 9.6 oz), SpO2 (!) 93 %.     CT READ: Yes  No hemmorhage. No mass effect. No early infarct signs.     Physical Exam   Constitutional: He is oriented to person, place, and time. He appears well-developed and well-nourished.   HENT:   Head: Normocephalic and atraumatic.   Eyes: Pupils are equal, round, and reactive to light. EOM are normal.   Cardiovascular: Normal rate and regular rhythm.   Pulmonary/Chest: Effort normal.   Neurological: He is oriented to person, place, and time.   Vitals reviewed.        "

## 2019-09-06 NOTE — ED NOTES
Patient resting with eyes closed at this time. Patient has no nausea or emesis at this time. Respirations even and unlabored. Oxygen Saturation 96% on 2 liters of oxygen. Patient Blood pressure 187/89 at this time. NSR at this time. Patient denies any pain or discomfort at this time. Patient near nurses station. Will continue to monitor patient.

## 2019-09-06 NOTE — ASSESSMENT & PLAN NOTE
Dizziness  History of stroke  Hyperlipidemia  Hypertension  Non-intractable vomiting with nausea    -Continuous cardiac monitoring   -Troponin: WNL  -Vascular Sx consulted while patient was in ED, per note, non-intractable vomiting with nausea without evidence of new neurological findings on exam. Doubt stroke now. Probably related to malignant hypertension. Admit to [place] and get MRI brain.  -continue aspirin  -CT of head: negative  -MRI: pending  -MRA of head and Neck: pending  -Permissive HTN until after MRI & stroke ruled out  -2D echo: pending  -Neuro checks Q4hrs  -Lipid Panel: pending  -prn antiemetics

## 2019-09-06 NOTE — PROGRESS NOTES
VN cued into patient's room with patient's permission. Patient AAOx4 and resting in bed with IVF infusing and oxygen in place.  POC reviewed with patient. MRI of brain to be completed today, patient aware. Patient denies any pain but reports nausea. Bedside nurse notified and will administer PRN Zofran. Patient reqesting to have HOB up to try to eat breakfast, PCT notified to assist patient. Admission completed. Fall risk protocol discussed with patient. Urinal at bedside and in patient reach. VN instructed patient to call for assistance. Patient aware and agreeable. NAD noted Allowed time for questions. Will continue to be available and intervene as needed.        09/06/19 0800   Type of Frequent Check   Type Patient Rounds  (VN rounds)   Safety/Activity   Patient Rounds bed in low position;bed wheels locked;call light in patient/parent reach;clutter free environment maintained;placement of personal items at bedside;visualized patient   Safety Promotion/Fall Prevention assistive device/personal item within reach;bed alarm set;Fall Risk reviewed with patient/family;Fall Risk signage in place;medications reviewed;side rails raised x 2;instructed to call staff for mobility   Positioning   Body Position supine   Head of Bed (HOB) HOB elevated   Positioning/Transfer Devices pillows;in use   Pain/Comfort/Sleep   Preferred Pain Scale number (Numeric Rating Pain Scale)   Comfort/Acceptable Pain Level 0   Pain Rating (0-10): Rest 0   Pain Rating (0-10): Activity 0   Sleep/Rest/Relaxation awake   Headache   Headache No   Nausea/Vomiting   Nausea/Vomiting   (patient reports nausea-bedside nurse notified )   Assessments (Pre/Post)   Level of Consciousness (AVPU) alert

## 2019-09-07 PROBLEM — R11.2 NON-INTRACTABLE VOMITING WITH NAUSEA: Status: RESOLVED | Noted: 2019-09-06 | Resolved: 2019-09-07

## 2019-09-07 LAB
ANION GAP SERPL CALC-SCNC: 7 MMOL/L (ref 8–16)
ANION GAP SERPL CALC-SCNC: 7 MMOL/L (ref 8–16)
BASOPHILS # BLD AUTO: 0.02 K/UL (ref 0–0.2)
BASOPHILS NFR BLD: 0.2 % (ref 0–1.9)
BUN SERPL-MCNC: 14 MG/DL (ref 8–23)
BUN SERPL-MCNC: 14 MG/DL (ref 8–23)
CALCIUM SERPL-MCNC: 8.6 MG/DL (ref 8.7–10.5)
CALCIUM SERPL-MCNC: 8.6 MG/DL (ref 8.7–10.5)
CHLORIDE SERPL-SCNC: 107 MMOL/L (ref 95–110)
CHLORIDE SERPL-SCNC: 107 MMOL/L (ref 95–110)
CHOLEST SERPL-MCNC: 188 MG/DL (ref 120–199)
CHOLEST/HDLC SERPL: 5.5 {RATIO} (ref 2–5)
CO2 SERPL-SCNC: 28 MMOL/L (ref 23–29)
CO2 SERPL-SCNC: 28 MMOL/L (ref 23–29)
CREAT SERPL-MCNC: 1.1 MG/DL (ref 0.5–1.4)
CREAT SERPL-MCNC: 1.1 MG/DL (ref 0.5–1.4)
DIFFERENTIAL METHOD: ABNORMAL
EOSINOPHIL # BLD AUTO: 0.2 K/UL (ref 0–0.5)
EOSINOPHIL NFR BLD: 2.8 % (ref 0–8)
ERYTHROCYTE [DISTWIDTH] IN BLOOD BY AUTOMATED COUNT: 15.6 % (ref 11.5–14.5)
EST. GFR  (AFRICAN AMERICAN): >60 ML/MIN/1.73 M^2
EST. GFR  (AFRICAN AMERICAN): >60 ML/MIN/1.73 M^2
EST. GFR  (NON AFRICAN AMERICAN): >60 ML/MIN/1.73 M^2
EST. GFR  (NON AFRICAN AMERICAN): >60 ML/MIN/1.73 M^2
FOLATE SERPL-MCNC: 6.1 NG/ML (ref 4–24)
GLUCOSE SERPL-MCNC: 87 MG/DL (ref 70–110)
GLUCOSE SERPL-MCNC: 87 MG/DL (ref 70–110)
HCT VFR BLD AUTO: 38.8 % (ref 40–54)
HDLC SERPL-MCNC: 34 MG/DL (ref 40–75)
HDLC SERPL: 18.1 % (ref 20–50)
HGB BLD-MCNC: 12.7 G/DL (ref 14–18)
LDLC SERPL CALC-MCNC: 127 MG/DL (ref 63–159)
LYMPHOCYTES # BLD AUTO: 2.6 K/UL (ref 1–4.8)
LYMPHOCYTES NFR BLD: 31.8 % (ref 18–48)
MAGNESIUM SERPL-MCNC: 1.8 MG/DL (ref 1.6–2.6)
MCH RBC QN AUTO: 26.4 PG (ref 27–31)
MCHC RBC AUTO-ENTMCNC: 32.7 G/DL (ref 32–36)
MCV RBC AUTO: 81 FL (ref 82–98)
MONOCYTES # BLD AUTO: 0.8 K/UL (ref 0.3–1)
MONOCYTES NFR BLD: 9.7 % (ref 4–15)
NEUTROPHILS # BLD AUTO: 4.5 K/UL (ref 1.8–7.7)
NEUTROPHILS NFR BLD: 55.5 % (ref 38–73)
NONHDLC SERPL-MCNC: 154 MG/DL
PHOSPHATE SERPL-MCNC: 2.3 MG/DL (ref 2.7–4.5)
PLATELET # BLD AUTO: 214 K/UL (ref 150–350)
PMV BLD AUTO: 11.6 FL (ref 9.2–12.9)
POCT GLUCOSE: 105 MG/DL (ref 70–110)
POCT GLUCOSE: 110 MG/DL (ref 70–110)
POCT GLUCOSE: 86 MG/DL (ref 70–110)
POCT GLUCOSE: 87 MG/DL (ref 70–110)
POTASSIUM SERPL-SCNC: 3.2 MMOL/L (ref 3.5–5.1)
POTASSIUM SERPL-SCNC: 3.2 MMOL/L (ref 3.5–5.1)
RBC # BLD AUTO: 4.81 M/UL (ref 4.6–6.2)
RPR SER QL: NORMAL
SODIUM SERPL-SCNC: 142 MMOL/L (ref 136–145)
SODIUM SERPL-SCNC: 142 MMOL/L (ref 136–145)
TRIGL SERPL-MCNC: 135 MG/DL (ref 30–150)
VIT B12 SERPL-MCNC: 416 PG/ML (ref 210–950)
WBC # BLD AUTO: 8.11 K/UL (ref 3.9–12.7)

## 2019-09-07 PROCEDURE — 96376 TX/PRO/DX INJ SAME DRUG ADON: CPT

## 2019-09-07 PROCEDURE — G0378 HOSPITAL OBSERVATION PER HR: HCPCS

## 2019-09-07 PROCEDURE — 96375 TX/PRO/DX INJ NEW DRUG ADDON: CPT

## 2019-09-07 PROCEDURE — 25000003 PHARM REV CODE 250: Performed by: NURSE PRACTITIONER

## 2019-09-07 PROCEDURE — 97161 PT EVAL LOW COMPLEX 20 MIN: CPT

## 2019-09-07 PROCEDURE — 63600175 PHARM REV CODE 636 W HCPCS: Performed by: NURSE PRACTITIONER

## 2019-09-07 PROCEDURE — 94761 N-INVAS EAR/PLS OXIMETRY MLT: CPT

## 2019-09-07 PROCEDURE — 85025 COMPLETE CBC W/AUTO DIFF WBC: CPT

## 2019-09-07 PROCEDURE — 36415 COLL VENOUS BLD VENIPUNCTURE: CPT

## 2019-09-07 PROCEDURE — 83735 ASSAY OF MAGNESIUM: CPT

## 2019-09-07 PROCEDURE — 80048 BASIC METABOLIC PNL TOTAL CA: CPT

## 2019-09-07 PROCEDURE — 80061 LIPID PANEL: CPT

## 2019-09-07 PROCEDURE — 84100 ASSAY OF PHOSPHORUS: CPT

## 2019-09-07 PROCEDURE — 97165 OT EVAL LOW COMPLEX 30 MIN: CPT

## 2019-09-07 RX ORDER — POTASSIUM CHLORIDE 20 MEQ/1
40 TABLET, EXTENDED RELEASE ORAL 3 TIMES DAILY
Status: DISCONTINUED | OUTPATIENT
Start: 2019-09-07 | End: 2019-09-08 | Stop reason: HOSPADM

## 2019-09-07 RX ORDER — FELODIPINE 2.5 MG/1
10 TABLET, EXTENDED RELEASE ORAL DAILY
Status: DISCONTINUED | OUTPATIENT
Start: 2019-09-07 | End: 2019-09-08 | Stop reason: HOSPADM

## 2019-09-07 RX ORDER — HYDRALAZINE HYDROCHLORIDE 20 MG/ML
10 INJECTION INTRAMUSCULAR; INTRAVENOUS EVERY 8 HOURS PRN
Status: DISCONTINUED | OUTPATIENT
Start: 2019-09-07 | End: 2019-09-08 | Stop reason: HOSPADM

## 2019-09-07 RX ORDER — MAGNESIUM SULFATE HEPTAHYDRATE 40 MG/ML
2 INJECTION, SOLUTION INTRAVENOUS ONCE
Status: COMPLETED | OUTPATIENT
Start: 2019-09-07 | End: 2019-09-07

## 2019-09-07 RX ORDER — CLOPIDOGREL BISULFATE 75 MG/1
75 TABLET ORAL DAILY
Status: DISCONTINUED | OUTPATIENT
Start: 2019-09-07 | End: 2019-09-08 | Stop reason: HOSPADM

## 2019-09-07 RX ADMIN — POTASSIUM CHLORIDE 40 MEQ: 20 TABLET, EXTENDED RELEASE ORAL at 08:09

## 2019-09-07 RX ADMIN — POTASSIUM CHLORIDE 40 MEQ: 20 TABLET, EXTENDED RELEASE ORAL at 09:09

## 2019-09-07 RX ADMIN — POTASSIUM CHLORIDE 40 MEQ: 20 TABLET, EXTENDED RELEASE ORAL at 03:09

## 2019-09-07 RX ADMIN — MAGNESIUM SULFATE IN WATER 2 G: 40 INJECTION, SOLUTION INTRAVENOUS at 03:09

## 2019-09-07 RX ADMIN — HYDRALAZINE HYDROCHLORIDE 10 MG: 20 INJECTION INTRAMUSCULAR; INTRAVENOUS at 06:09

## 2019-09-07 RX ADMIN — ATORVASTATIN CALCIUM 40 MG: 40 TABLET, FILM COATED ORAL at 09:09

## 2019-09-07 RX ADMIN — LISINOPRIL 40 MG: 20 TABLET ORAL at 08:09

## 2019-09-07 RX ADMIN — ASPIRIN 81 MG: 81 TABLET, COATED ORAL at 08:09

## 2019-09-07 RX ADMIN — FELODIPINE 10 MG: 2.5 TABLET, EXTENDED RELEASE ORAL at 06:09

## 2019-09-07 RX ADMIN — METOPROLOL TARTRATE 100 MG: 50 TABLET ORAL at 09:09

## 2019-09-07 RX ADMIN — METOPROLOL TARTRATE 100 MG: 50 TABLET ORAL at 08:09

## 2019-09-07 RX ADMIN — CLOPIDOGREL BISULFATE 75 MG: 75 TABLET ORAL at 06:09

## 2019-09-07 NOTE — PLAN OF CARE
Problem: Physical Therapy Goal  Goal: Physical Therapy Goal  Outcome: Outcome(s) achieved Date Met: 09/07/19  PT evaluation was completed. Pt  Performed bed mobility and sit<>stand transfer with independence. Pt ambulated 200 ft without AD requiring MOD I tending to scissor occassionally and ambulate with narrow ELVIS. However pt reports this is his baseline since residual deficits from L CVA in 2011. Overall pt is very stable during gait/ transfers and exhibited G dynamic stand balance including high level balance( ie. Tandem, single LE stance and side stepping) Pt doesn't require PT services at this time or have any DME needs.

## 2019-09-07 NOTE — PT/OT/SLP EVAL
Physical Therapy Evaluation and Discharge Note    Patient Name:  Steven Shields   MRN:  [id number]    Recommendations:     Discharge Recommendations:  home   Discharge Equipment Recommendations: none   Barriers to discharge: None    Assessment:     Steven Shields is a 59 y.o. male admitted with a medical diagnosis of Hypertensive urgency. .  At this time, patient is functioning at their prior level of function and does not require further acute PT services.     Recent Surgery: * No surgery found *      Plan:     During this hospitalization, patient does not require further acute PT services.  Please re-consult if situation changes.      Subjective     Chief Complaint: no particular c/o; reports symptoms have resolved  Patient/Family Comments/goals: to return home  Pain/Comfort:  · Pain Rating 1: 0/10  · Pain Rating Post-Intervention 1: 0/10    Patients cultural, spiritual, Uatsdin conflicts given the current situation: no    Living Environment:  Per pt report, pt is estranged from wife and  was living out of his truck for one and 1/2 months. 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 Pt is waiting for a apartment to become vacant and can rent a room from the [facility] temporarily until apartment is available upon discharge from hospital.  Prior to admission, patients level of function was independent.  Equipment used at home: none.  DME owned (not currently used): none.  Upon discharge, patient will have assistance from no one. His siblings all live out of state..    Objective:     Communicated with RN , Sofia, prior to session.  Patient found HOB elevated with telemetry, bed alarm upon PT entry to room.    General Precautions: Standard, fall   Orthopedic Precautions:N/A   Braces: N/A     Exams:  · Cognitive Exam:  Patient is oriented to Person, Place, Time and Situation  · Gross Motor Coordination:  WFL B LE including heel to shin and alternating toe  taps  · Postural Exam:  Patient presented with the following abnormalities:    · -       No postural abnormalities identified  · Sensation:    · -       Intact light touch B LE  · RLE ROM: WFL  · RLE Strength: 4+/5  · LLE ROM: WFL  · LLE Strength: 5/5    Functional Mobility:  · Bed Mobility:     · Supine to Sit: independence  · Sit to Supine: independence  · Transfers:     · Sit to Stand:  independence with no AD  · Gait: ambulated 200 ft without AD with MOD I exhibiting occassional scissoring and narrow ELVIS. Pt reports this is his baseline gait pattern since L CVA in 2011. No LOB was noted and pt exhibited good stability with transfer/ gait skills.  · Balance:   Static stand: G;   Dynamic stand: G including high level balance ( tandem and single LE stance)    AM-PAC 6 CLICK MOBILITY  Total Score:24       Therapeutic Activities and Exercises:   See the above assessment for mobility levels    AM-[facility] 6 CLICK MOBILITY  Total Score:24     Patient left sitting at EOB with call button in reach, RN notified and brother present.    GOALS:   Multidisciplinary Problems     Physical Therapy Goals     Not on file          Multidisciplinary Problems (Resolved)        Problem: Physical Therapy Goal    Goal Priority Disciplines Outcome Goal Variances Interventions   Physical Therapy Goal   (Resolved)     PT, PT/OT Outcome(s) achieved                     History:     Past Medical History:   Diagnosis Date    Diabetes mellitus type II     Hyperlipidemia     Hypertension     Stroke     Nov 2011, denies deficits        Past Surgical History:   Procedure Laterality Date    ANKLE FRACTURE SURGERY      HAND SURGERY Left     NERVE GRAFT         Time Tracking:     PT Received On:  9/7/19  PT Start Time:     12:01  PT Stop Time:  12:26  PT Total Time (min):   25    Billable Minutes: Evaluation 25      Ashley Collazo, PT  09/07/2019

## 2019-09-07 NOTE — SUBJECTIVE & OBJECTIVE
Interval History: Resting in bed, no complaints or concerns, agree with plan of care.    Review of Systems   Constitutional: Negative for chills and fever.   HENT: Negative for congestion, postnasal drip and rhinorrhea.    Eyes: Negative for visual disturbance.   Respiratory: Negative for chest tightness and shortness of breath.    Cardiovascular: Negative for chest pain.   Gastrointestinal: Negative for abdominal distention and abdominal pain.   Genitourinary: Negative for difficulty urinating.   Musculoskeletal: Negative for arthralgias and myalgias.   Skin: Negative for color change.   Neurological: Negative for weakness, light-headedness and headaches.   Hematological: Does not bruise/bleed easily.   Psychiatric/Behavioral: Negative for agitation.     Objective:     Vital Signs (Most Recent):  Temp: 97.6 °F (36.4 °C) (09/07/19 1629)  Pulse: 72 (09/07/19 1629)  Resp: 18 (09/07/19 1629)  BP: (!) 168/78 (09/07/19 1629)  SpO2: 95 % (09/07/19 1632) Vital Signs (24h Range):  Temp:  [96.2 °F (35.7 °C)-98 °F (36.7 °C)] 97.6 °F (36.4 °C)  Pulse:  [] 72  Resp:  [18-19] 18  SpO2:  [92 %-99 %] 95 %  BP: (118-211)/() 168/78     Weight: 105.5 kg (232 lb 9.4 oz)  Body mass index is 34.35 kg/m².    Intake/Output Summary (Last 24 hours) at 9/7/2019 1711  Last data filed at 9/7/2019 0900  Gross per 24 hour   Intake 250 ml   Output 1625 ml   Net -1375 ml      Physical Exam   Constitutional: He is oriented to person, place, and time. He appears well-developed and well-nourished.   HENT:   Head: Normocephalic and atraumatic.   Eyes: EOM are normal.   Neck: Normal range of motion.   Cardiovascular: Normal rate.   Pulmonary/Chest: Effort normal and breath sounds normal.   Abdominal: Soft. Bowel sounds are normal.   Musculoskeletal: Normal range of motion.   Neurological: He is alert and oriented to person, place, and time.   Skin: Skin is warm and dry.   Psychiatric: He has a normal mood and affect.       Significant Labs:    A1C:   Recent Labs   Lab 09/06/19  0916   HGBA1C 5.9*  5.9*     BMP:   Recent Labs   Lab 09/07/19  0516   GLU 87  87     142   K 3.2*  3.2*     107   CO2 28  28   BUN 14  14   CREATININE 1.1  1.1   CALCIUM 8.6*  8.6*   MG 1.8     CBC:   Recent Labs   Lab 09/06/19  0401 09/07/19  0516   WBC 8.45 8.11   HGB 13.8* 12.7*   HCT 41.4 38.8*    214     CMP:   Recent Labs   Lab 09/06/19  0403 09/06/19  0916 09/07/19  0516     --  142  142   K 2.7* 2.8* 3.2*  3.2*     --  107  107   CO2 29  --  28  28   *  --  87  87   BUN 16  --  14  14   CREATININE 1.18  --  1.1  1.1   CALCIUM 8.8  --  8.6*  8.6*   PROT 7.7  --   --    ALBUMIN 4.1  --   --    BILITOT 0.4  --   --    ALKPHOS 84  --   --    AST 27  --   --    ALT 20  --   --    ANIONGAP 7*  --  7*  7*   EGFRNONAA >60.0  --  >60  >60     Lipid Panel:   Recent Labs   Lab 09/07/19  0516   CHOL 188   HDL 34*   LDLCALC 127.0   TRIG 135   CHOLHDL 18.1*     Magnesium:   Recent Labs   Lab 09/07/19  0516   MG 1.8     Troponin:   Recent Labs   Lab 09/06/19  0403 09/06/19  1045   TROPONINI 0.024 0.053*     TSH: No results for input(s): TSH in the last 4320 hours.  Urine Studies:   Recent Labs   Lab 09/06/19  0403   COLORU Straw   APPEARANCEUA Clear   PHUR 7.0   SPECGRAV 1.010   PROTEINUA 2+*   GLUCUA Negative   KETONESU Negative   BILIRUBINUA Negative   OCCULTUA Trace*   NITRITE Negative   UROBILINOGEN Negative   LEUKOCYTESUR Negative   RBCUA 0   WBCUA 0   BACTERIA None   HYALINECASTS 0       Significant Imaging: I have reviewed all pertinent imaging results/findings within the past 24 hours.

## 2019-09-07 NOTE — PROGRESS NOTES
2111 pt had a automatic bp of 198/107, manual 182/104. NP notified and ordered metoprolol.  2255 pt bp rechecked. 211/115 automatic, 198/102 manual. NP Hannah notified. Advised to give it more time. Will recheck bp at 2330. Will  Continue to monitor.    2330: /88. NP notified about bp, no new orders given. Pt is complaining of Headache. tylenol administered. Will continue to monitor.

## 2019-09-07 NOTE — PLAN OF CARE
Problem: Occupational Therapy Goal  Goal: Occupational Therapy Goal  Outcome: Outcome(s) achieved Date Met: 09/07/19  OT initial eval completed and pt at baseline ADLs and fx mobility-independent.  DC OT no needs.

## 2019-09-07 NOTE — ASSESSMENT & PLAN NOTE
Hemoglobin A1c 5.9  Current BS: 188  POC glucose checks ac meals and nightly  Continue home Detemir at decreased dose: 10 units nightly  Low dose SSI PRN  Hypoglycemia protocol PRN  Hold oral hypoglycemic agents  Diabetic Diet  LDL: 127  Patient states he was taken off statin by PCP  Start DAPT with statin

## 2019-09-07 NOTE — PT/OT/SLP EVAL
Occupational Therapy   Evaluation and Discharge Note    Name: Steevn Shields  MRN: [id number]  Admitting Diagnosis:  Hypertensive urgency      Recommendations:     Discharge Recommendations: home  Discharge Equipment Recommendations:  none  Barriers to discharge:  None    Assessment:     Steven Shields is a 59 y.o. male with a medical diagnosis of Hypertensive urgency. At this time, patient is functioning at their prior level of function and does not require further acute OT services.     Plan:     During this hospitalization, patient does not require further acute OT services.  Please re-consult if situation changes.    · Plan of Care Reviewed with: patient, sibling    Subjective     Chief Complaint: none  Patient/Family Comments/goals: none    Occupational Profile:  Living Environment: Per pt report, pt is estranged from wife and  was living out of his truck for one and 1/2 months. 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 Pt is waiting for a apartment to become vacant and can rent a room from the landlord temporarily until apartment is available upon discharge from hospital.  Previous level of function: idep ADLs and ambulates indep without a device; drives, eats out a lot.  Roles and Routines: active  Equipment Used at home:  none  Assistance upon Discharge: no support in state 2/2 siblings live out of town.    Pain/Comfort:  · Pain Rating 1: 0/10  · Pain Rating Post-Intervention 1: 0/10    Patients cultural, spiritual, Jainism conflicts given the current situation: no    Objective:     Communicated with: nurse prior to session.  Patient found seated EOB with bed alarm, telemetry upon OT entry to room.    General Precautions: Standard, fall   Orthopedic Precautions:N/A   Braces: N/A     Occupational Performance:    Functional Mobility/Transfers:  · Patient completed Sit <> Stand Transfer with independence  with  no assistive device   · Patient completed Toilet  Transfer Step Transfer technique with independence with  no AD  · Functional Mobility: ambulated in room to bathroom and back to bed, no LOB, took challenges all directions no LOB.    Activities of Daily Living:  · Feeding:  independence .  · Grooming: independence .  · Upper Body Dressing: independence .  · Lower Body Dressing: independence .  · Toileting: independence .    Cognitive/Visual Perceptual:  Cognitive/Psychosocial Skills:     -       Oriented to: Person, Place, Time and Situation   -       Follows Commands/attention:Follows two-step commands  -       Communication: clear/fluent  -       Memory: No Deficits noted  -       Safety awareness/insight to disability: intact   -       Mood/Affect/Coping skills/emotional control: Appropriate to situation  Visual/Perceptual:      -Intact tracking, R visual field upper quadrant mild beating nystagmus.    Physical Exam:  Balance:    -       sitting and standing balance is good.  Postural examination/scapula alignment:    -       Rounded shoulders  Sensation:    -       Intact  Motor Planning:    -       intact  Dominant hand:    -       right  Upper Extremity Range of Motion:     -       Right Upper Extremity: WNL  -       Left Upper Extremity: WNL  Upper Extremity Strength:    -       Right Upper Extremity: WNL  -       Left Upper Extremity: WNL   Strength:    -       Right Upper Extremity: WNL  -       Left Upper Extremity: WNL  Fine Motor Coordination:    -       Intact  Gross motor coordination:   WFL  Neurological:    -       normal tone    AMPAC 6 Click ADL:  AMPAC Total Score: 24    Treatment & Education:  Role of OT  No OT needs identified.  Dc OT services.  Education:    Patient left seated EOB with all lines intact, call button in reach, nurse notified and brother present    GOALS:   Multidisciplinary Problems     Occupational Therapy Goals     Not on file          Multidisciplinary Problems (Resolved)        Problem: Occupational Therapy Goal    Goal  Priority Disciplines Outcome Interventions   Occupational Therapy Goal   (Resolved)     OT, PT/OT Outcome(s) achieved                    History:     Past Medical History:   Diagnosis Date    Diabetes mellitus type II     Hyperlipidemia     Hypertension     Stroke     Nov 2011, denies deficits        Past Surgical History:   Procedure Laterality Date    ANKLE FRACTURE SURGERY      HAND SURGERY Left     NERVE GRAFT         Time Tracking:     OT Date of Treatment: 09/07/19  OT Start Time: 1313  OT Stop Time: 1326  OT Total Time (min): 13 min    Billable Minutes:Evaluation 13  Total Time 13    Kathi Bar OT  9/7/2019

## 2019-09-07 NOTE — PLAN OF CARE
Problem: Adult Inpatient Plan of Care  Goal: Plan of Care Review  Outcome: Ongoing (interventions implemented as appropriate)  Plan of care reviewed with patient, understanding verbalized. Pt remains SR on tele. BG monitored and SSI administered per orders. No acute distress noted. Tylenol administered for complaints of headache. Instructed to call for any assistance, understanding verbalized.  Bed alarm on, call light in reach, fall precautions in place. Will continue to monitor.

## 2019-09-07 NOTE — CONSULTS
[facility] NEUROLOGY CONSULT  EVALUATION    Reason for consult:  MRI w/ acute stroke    Other sources of information : Patient and Chart Revieww    CC:  Dizziness (PT found by EMS sitting in chair with complaints of dizziness, blurred vision, and emesis. Patient stated symptoms started at 2am this morning. Patient stated he woke up with dizziness. Patient has no numbness or tingling. No new deficits per patient. )       HPI: Steven Shielsd is a 59 y.o. male with PMH DM2, HTN, HLD, and prior CVA without residual deficits who presented to OSH on 9/6 with complaints of acute onset persistent dizziness, nausea and vomiting. The patient reports that these symptoms are similar to prior stroke symptoms. The patient was stroke activated at an OSH with NIHSS of 4 and transferred to [facility] for further care w/o any intervention with tPA. The patient was asymptomatic upon transfer arrival with complete return to baseline. He has since undergone MRI Brain with findings significant for small area of diffusion restriction in the L subcortical parietal lobe. The patient remains relatively asymptomatic despite these findings.     The patient initially presented with poorly controlled blood pressure with systolic pressure >240 at the OSH. He reports not taking his blood pressure and cholesterol medications regularly as he has been out of certain medications over the past several weeks. He also reports a history of KADIE (2011) but is not currently using a CPAP machine. He denies any current nausea, vomiting, dizziness, weakness, paresthesia, dysarthria, aphasia, or visual disturbance.         Outpatient Neurologist: None       ROS:   12pt ROS negative other then mentioned above    Histories:     Allergies:  Pneumococcal 23-timothy ps vaccine    Current Medications:    Current Facility-Administered Medications   Medication Dose Route Frequency Provider Last Rate Last Dose    acetaminophen tablet 650 mg  650 mg Oral Q4H PRN Amrita Young NP    650 mg at 09/06/19 2339    aspirin EC tablet 81 mg  81 mg Oral Daily Felicity Bedolla NP   81 mg at 09/07/19 0850    atorvastatin tablet 40 mg  40 mg Oral Nightly Felicity Bedolla NP   40 mg at 09/06/19 2200    clopidogrel tablet 75 mg  75 mg Oral Daily Felicity Bedolla NP        dextrose 10% (D10W) Bolus  12.5 g Intravenous PRN Cory Crystal MD        dextrose 10% (D10W) Bolus  25 g Intravenous PRN Cory Crystal MD        felodipine 24 hr tablet 10 mg  10 mg Oral Daily Felicity Bedolla NP        glucagon (human recombinant) injection 1 mg  1 mg Intramuscular PRN Amrita Young NP        glucose chewable tablet 16 g  16 g Oral PRN Amrita Young NP        glucose chewable tablet 24 g  24 g Oral PRN Amrita Young NP        hydrALAZINE injection 10 mg  10 mg Intravenous Q8H PRN Amrita Young NP   10 mg at 09/07/19 0646    influenza (QUADRIVALENT PF) vaccine 0.5 mL  0.5 mL Intramuscular Prior to discharge Sandeep Carlos,         insulin aspart U-100 pen 0-5 Units  0-5 Units Subcutaneous QID (AC + HS) PRN Amrita Young NP   2 Units at 09/06/19 1723    insulin detemir U-100 pen 10 Units  10 Units Subcutaneous QHS Felicity Bedolla NP   10 Units at 09/06/19 2200    lisinopril tablet 40 mg  40 mg Oral Daily Amrita Young NP   40 mg at 09/07/19 0851    metoprolol tartrate (LOPRESSOR) tablet 100 mg  100 mg Oral BID Amrita Young NP   100 mg at 09/07/19 0850    ondansetron injection 4 mg  4 mg Intravenous Q8H PRN Amrita Young NP   4 mg at 09/06/19 0904    potassium chloride SA CR tablet 40 mEq  40 mEq Oral TID Felicity Bedolla NP   40 mEq at 09/07/19 1509    sodium chloride 0.9% flush 10 mL  10 mL Intravenous PRN Amrita Young NP             Past Medical/Surgical/Family/Social History:  PMHx:   Past Medical History:   Diagnosis Date    Diabetes mellitus type II     Hyperlipidemia     Hypertension     Stroke     Nov 2011, denies deficits        Surgeries:   Past Surgical History:   Procedure Laterality Date    ANKLE FRACTURE SURGERY      HAND SURGERY Left     NERVE GRAFT        Family  Hx:   Family History   Problem Relation Age of Onset    Heart disease Mother     Heart disease Father       Social Hx:   Social History     Tobacco Use    Smoking status: Former Smoker     Packs/day: 1.00     Years: 15.00     Pack years: 15.00     Last attempt to quit: 1990     Years since quittin.7    Smokeless tobacco: Never Used   Substance Use Topics    Alcohol use: No     Frequency: Never    Drug use: No         Current Evaluation:     Vital Signs:   Vitals:    19 1629   BP: (!) 168/78   Pulse: 72   Resp: 18   Temp: 97.6 °F (36.4 °C)        General Exam  No apparent distress  Orientation  Alert, awake, oriented to self, place, time, and situation.  Memory  Recent and remote memory intact.  Language  Fluent speech. No dysarthria, No aphasia.   Cranial Nerves  PERRL, VF intact, EOMI, V1-V3 intact, symmetric facial expression, hearing grossly intact, SCM & TPZ 5/5, tongue midline, symmetric palate elevation.  Motor  Normal Bulk, Normal Tone  No pronator drift  No orbiting observed with rapid arm over arm movements  Right Upper Extremity: Normal 5/5 strength  Left Upper Extremity: Normal 5/5 strength  Right Lower Extremity: Normal 5/5 strength  Left Lower Extremity: Normal 5/5 strength  Sensory  Normal to light touch throughout; decreased over L ulnar distribution after prior LUE surgery  Normal vibration and proprioception  Romberg Negative  DTR  Upper Extremities:  +2/4, symmetric  Lower Extremities: Patellar and Achilles +2/4 and symmetric  Cerebellar/Gait  Normal finger to nose and heel to shin.   Normal gait and stance.       LABORATORY STUDIES:  Labs:  Recent Labs   Lab 19  0401 19  0516   WBC 8.45 8.11   HGB 13.8* 12.7*   HCT 41.4 38.8*    214   MCV 80* 81*       Recent Labs   Lab 19  0403 19  0916  09/07/19  0516     --  142  142   K 2.7* 2.8* 3.2*  3.2*     --  107  107   CO2 29  --  28  28   BUN 16  --  14  14   *  --  87  87   CALCIUM 8.8  --  8.6*  8.6*   PROT 7.7  --   --    ALBUMIN 4.1  --   --    BILITOT 0.4  --   --    AST 27  --   --    ALKPHOS 84  --   --    ALT 20  --   --        Recent Labs   Lab 09/06/19  0402   INR 1.1       Recent Labs   Lab 09/06/19  0403 09/07/19  0516   * 87  87       Urine:   Lab Results   Component Value Date    SPECGRAV 1.010 09/06/2019    NITRITE Negative 09/06/2019    KETONESU Negative 09/06/2019    UROBILINOGEN Negative 09/06/2019    WBCUA 0 09/06/2019       Recent Labs   Lab 09/06/19  0916 09/07/19  0516   HGBA1C 5.9*  5.9*  --    LDLCALC  --  127.0       Thyroid:   No results for input(s): TSH, FREET4, D4HIUZG, W3EDVQF, THYROIDAB in the last 168 hours.    FLP:   Recent Labs   Lab 09/07/19  0516   CHOL 188   HDL 34*   LDLCALC 127.0   TRIG 135   CHOLHDL 18.1*       Cardiac markers:  Recent Labs   Lab 09/06/19  0403 09/06/19  1045   TROPONINI 0.024 0.053*       RADIOLOGY STUDIES:  CT head w/o contrast: no acute abnormality  MRI Brain w/o contrast: small area of diffusion restriction in L high parietal subcortical region; old infarcts in fabiola and periventricular white matter  MRA Head and neck: unremarkable without evidence of LVO or significant stenosis    OTHER STUDIES/PROCEDURES:  TTE: negative bubble study; normal LV systolic function with mild diastolic dysfunction      Assessment/Plan:     Mr. Shields is a 60 y/o M with stroke risk factors including DM2, HLD, HTN, and prior CVA who presented with complaints of dizziness. Patient with symptomatic resolution since admission consulted to neurology with concerns for findings of acute infarct on MRI. Lab evaluation with well controlled DM and A1c of 5.9, LDL of 127, normal B12/folate and negative RPR. Imaging workup concerning for MRI with small area of diffusion restriction c/w ischemic  infarct in the subcortical left parietal lobe. Vascular imaging largely unremarkable without LVO or evidence of stenosis. TTE with negative bubble study. EKG with NSR. Suspect etiology associated with small vessel disease in setting of poorly controlled hypertension.      Small L Parietal CVA:  -Agree with ASA 81 mg daily and Plavix 75 mg daily; would continue for 21 days and then discontinue Plavix with continuation of ASA.   -BP: Hypertensive Urgency on admission; recommend BP <180. Appropriate to resume home oral anti-hypertensive agents.  -; agree with starting high intensity statin, continue Atorvastatin 40 mg daily  -DM: well controlled on current regimen with A1c of 5.9  -Recommend checking TSH  PT/OT without further needs  -Suspect etiology associated with small vessel disease  -Counseled patient on importance of stroke risk factor management in preventing future episodes.       Differential diagnosis was explained to the patient. All questions were answered. Patient understood and agreed to adhere to plan.     No further intervention indicated at this time from Neurology Service. Please call if any further questions or any changes in neurologic condition.    Case discussed with Dr. Blackman    Thank you for your consult.    Electronically signed by: Kingsley Gutierrez MD 9/7/2019 6:35 PM

## 2019-09-07 NOTE — PLAN OF CARE
Attempted to perform Virtual round, pt off unit in MRI, will be available to intervene if needed.

## 2019-09-07 NOTE — HOSPITAL COURSE
9/7/19 MRI suggestive of an acute infarct, started DAPT, consulted neurology.  9/8/19  No complaints or concerns, focal deficits resolved, blood pressure improved, appreciate neurology recs, continue DAPT.  Patient hemodynamically stable and okay to discharge home and follow up with neurology as outpatient.

## 2019-09-07 NOTE — PROGRESS NOTES
0525 pt /97 automatic, 180/100 manual. Pt stated He is concerned about not having his MRI Friday and is upset about his elevated BP. He requested to talk to the NP. NP notified. No new orders given at this moment. Will continue to monitor.

## 2019-09-07 NOTE — PROGRESS NOTES
"[facility] [facility] Medicine  Progress Note    Patient Name: Steven Shields  MRN: [id number]  Patient Class: OP- Observation   Admission Date: 9/6/2019  Length of Stay: 0 days  Attending Physician: Sandeep Carlos DO  Primary Care Provider: Julian Carson MD        Subjective:     Principal Problem:Hypertensive urgency        HPI:  59 y.o. Male with past medical history of DM type 2, hyperlipidemia, HTN and CVA presents with dizziness that started yesterday night and has been persistent.  Patient states symptoms were similar to past CVA so he was very concerned and came to ED.  Reports waking up in the middle of the night,  feeling "funny" and when sitting all the way up, felt dizzy and "off balance."  Patient reports associated nausea and vomiting x1 episode NBNB.  Denies fever, chills, SOB, cough, chest pain, neck stiffness or any other focal deficits. He reports dizziness at rest that worsens when moving.  Denies aggravating or alleviating factors.  Findings: K+ 2.7, U/A negative, CT of head negative for acute changes, chest x-ray negative for acute changes, EKG negative for ischemic changes.  Patient admitted for further medical management.    Overview/Hospital Course:  9/7/19 MRI suggestive of an acute infarct, started DAPT, consulted neurology.    Interval History: Resting in bed, no complaints or concerns, agree with plan of care.    Review of Systems   Constitutional: Negative for chills and fever.   HENT: Negative for congestion, postnasal drip and rhinorrhea.    Eyes: Negative for visual disturbance.   Respiratory: Negative for chest tightness and shortness of breath.    Cardiovascular: Negative for chest pain.   Gastrointestinal: Negative for abdominal distention and abdominal pain.   Genitourinary: Negative for difficulty urinating.   Musculoskeletal: Negative for arthralgias and myalgias.   Skin: Negative for color change.   Neurological: Negative for weakness, light-headedness and " headaches.   Hematological: Does not bruise/bleed easily.   Psychiatric/Behavioral: Negative for agitation.     Objective:     Vital Signs (Most Recent):  Temp: 97.6 °F (36.4 °C) (09/07/19 1629)  Pulse: 72 (09/07/19 1629)  Resp: 18 (09/07/19 1629)  BP: (!) 168/78 (09/07/19 1629)  SpO2: 95 % (09/07/19 1632) Vital Signs (24h Range):  Temp:  [96.2 °F (35.7 °C)-98 °F (36.7 °C)] 97.6 °F (36.4 °C)  Pulse:  [] 72  Resp:  [18-19] 18  SpO2:  [92 %-99 %] 95 %  BP: (118-211)/() 168/78     Weight: 105.5 kg (232 lb 9.4 oz)  Body mass index is 34.35 kg/m².    Intake/Output Summary (Last 24 hours) at 9/7/2019 1711  Last data filed at 9/7/2019 0900  Gross per 24 hour   Intake 250 ml   Output 1625 ml   Net -1375 ml      Physical Exam   Constitutional: He is oriented to person, place, and time. He appears well-developed and well-nourished.   HENT:   Head: Normocephalic and atraumatic.   Eyes: EOM are normal.   Neck: Normal range of motion.   Cardiovascular: Normal rate.   Pulmonary/Chest: Effort normal and breath sounds normal.   Abdominal: Soft. Bowel sounds are normal.   Musculoskeletal: Normal range of motion.   Neurological: He is alert and oriented to person, place, and time.   Skin: Skin is warm and dry.   Psychiatric: He has a normal mood and affect.       Significant Labs:   A1C:   Recent Labs   Lab 09/06/19  0916   HGBA1C 5.9*  5.9*     BMP:   Recent Labs   Lab 09/07/19  0516   GLU 87  87     142   K 3.2*  3.2*     107   CO2 28  28   BUN 14  14   CREATININE 1.1  1.1   CALCIUM 8.6*  8.6*   MG 1.8     CBC:   Recent Labs   Lab 09/06/19  0401 09/07/19  0516   WBC 8.45 8.11   HGB 13.8* 12.7*   HCT 41.4 38.8*    214     CMP:   Recent Labs   Lab 09/06/19  0403 09/06/19  0916 09/07/19  0516     --  142  142   K 2.7* 2.8* 3.2*  3.2*     --  107  107   CO2 29  --  28  28   *  --  87  87   BUN 16  --  14  14   CREATININE 1.18  --  1.1  1.1   CALCIUM 8.8  --  8.6*  8.6*    PROT 7.7  --   --    ALBUMIN 4.1  --   --    BILITOT 0.4  --   --    ALKPHOS 84  --   --    AST 27  --   --    ALT 20  --   --    ANIONGAP 7*  --  7*  7*   EGFRNONAA >60.0  --  >60  >60     Lipid Panel:   Recent Labs   Lab 09/07/19  0516   CHOL 188   HDL 34*   LDLCALC 127.0   TRIG 135   CHOLHDL 18.1*     Magnesium:   Recent Labs   Lab 09/07/19  0516   MG 1.8     Troponin:   Recent Labs   Lab 09/06/19  0403 09/06/19  1045   TROPONINI 0.024 0.053*     TSH: No results for input(s): TSH in the last 4320 hours.  Urine Studies:   Recent Labs   Lab 09/06/19  0403   COLORU Straw   APPEARANCEUA Clear   PHUR 7.0   SPECGRAV 1.010   PROTEINUA 2+*   GLUCUA Negative   KETONESU Negative   BILIRUBINUA Negative   OCCULTUA Trace*   NITRITE Negative   UROBILINOGEN Negative   LEUKOCYTESUR Negative   RBCUA 0   WBCUA 0   BACTERIA None   HYALINECASTS 0       Significant Imaging: I have reviewed all pertinent imaging results/findings within the past 24 hours.      Assessment/Plan:      * Hypertensive urgency  Dizziness-resolved  History of stroke  Hyperlipidemia  Hypertension  Non-intractable vomiting with nausea-resolved    -Continuous cardiac monitoring   --118  -Troponin: WNL  -Vascular Sx consulted while patient was in ED, per note, non-intractable vomiting with nausea without evidence of new neurological findings on exam. Doubt stroke now. Probably related to malignant hypertension. Admit to [facility] and get MRI brain.  -continue aspirin  -CT of head: negative  -MRI: Suggestive of acute infarct  -MRA of head and Neck: No blockages  -continue home antihypertensives  -Hydralazine prn  -2D echo: EF 65%  -Neuro checks Q4hrs  -Lipid Panel: 127  -prn antiemetics      Type 2 diabetes mellitus  Hemoglobin A1c 5.9  Current BS: 188  POC glucose checks ac meals and nightly  Continue home Detemir at decreased dose: 10 units nightly  Low dose SSI PRN  Hypoglycemia protocol PRN  Hold oral hypoglycemic agents  Diabetic Diet  LDL:  127  Patient states he was taken off statin by PCP  Start DAPT with statin              Hypokalemia  Replace  Monitor        VTE Risk Mitigation (From admission, onward)        Ordered     IP VTE LOW RISK PATIENT  Once      09/06/19 0750     Place sequential compression device  Until discontinued      09/06/19 0750                Felicity Bedolla NP  Department of Hospital Medicine   [facility]

## 2019-09-08 VITALS
BODY MASS INDEX: 34.45 KG/M2 | DIASTOLIC BLOOD PRESSURE: 92 MMHG | SYSTOLIC BLOOD PRESSURE: 166 MMHG | RESPIRATION RATE: 18 BRPM | TEMPERATURE: 97 F | OXYGEN SATURATION: 95 % | WEIGHT: 232.56 LBS | HEIGHT: 69 IN | HEART RATE: 76 BPM

## 2019-09-08 PROBLEM — I16.0 HYPERTENSIVE URGENCY: Status: RESOLVED | Noted: 2019-09-06 | Resolved: 2019-09-08

## 2019-09-08 PROBLEM — I63.9 ACUTE CEREBRAL INFARCTION: Status: ACTIVE | Noted: 2019-09-08

## 2019-09-08 LAB
ANION GAP SERPL CALC-SCNC: 8 MMOL/L (ref 8–16)
ANION GAP SERPL CALC-SCNC: 8 MMOL/L (ref 8–16)
BASOPHILS # BLD AUTO: 0.03 K/UL (ref 0–0.2)
BASOPHILS NFR BLD: 0.5 % (ref 0–1.9)
BUN SERPL-MCNC: 14 MG/DL (ref 8–23)
BUN SERPL-MCNC: 14 MG/DL (ref 8–23)
CALCIUM SERPL-MCNC: 8.9 MG/DL (ref 8.7–10.5)
CALCIUM SERPL-MCNC: 8.9 MG/DL (ref 8.7–10.5)
CHLORIDE SERPL-SCNC: 106 MMOL/L (ref 95–110)
CHLORIDE SERPL-SCNC: 106 MMOL/L (ref 95–110)
CO2 SERPL-SCNC: 26 MMOL/L (ref 23–29)
CO2 SERPL-SCNC: 26 MMOL/L (ref 23–29)
CREAT SERPL-MCNC: 1.1 MG/DL (ref 0.5–1.4)
CREAT SERPL-MCNC: 1.1 MG/DL (ref 0.5–1.4)
DIFFERENTIAL METHOD: ABNORMAL
EOSINOPHIL # BLD AUTO: 0.3 K/UL (ref 0–0.5)
EOSINOPHIL NFR BLD: 5.5 % (ref 0–8)
ERYTHROCYTE [DISTWIDTH] IN BLOOD BY AUTOMATED COUNT: 15.5 % (ref 11.5–14.5)
EST. GFR  (AFRICAN AMERICAN): >60 ML/MIN/1.73 M^2
EST. GFR  (AFRICAN AMERICAN): >60 ML/MIN/1.73 M^2
EST. GFR  (NON AFRICAN AMERICAN): >60 ML/MIN/1.73 M^2
EST. GFR  (NON AFRICAN AMERICAN): >60 ML/MIN/1.73 M^2
GLUCOSE SERPL-MCNC: 106 MG/DL (ref 70–110)
GLUCOSE SERPL-MCNC: 106 MG/DL (ref 70–110)
HCT VFR BLD AUTO: 41.9 % (ref 40–54)
HGB BLD-MCNC: 14.1 G/DL (ref 14–18)
LYMPHOCYTES # BLD AUTO: 1.6 K/UL (ref 1–4.8)
LYMPHOCYTES NFR BLD: 27 % (ref 18–48)
MAGNESIUM SERPL-MCNC: 1.8 MG/DL (ref 1.6–2.6)
MCH RBC QN AUTO: 27 PG (ref 27–31)
MCHC RBC AUTO-ENTMCNC: 33.7 G/DL (ref 32–36)
MCV RBC AUTO: 80 FL (ref 82–98)
MONOCYTES # BLD AUTO: 0.7 K/UL (ref 0.3–1)
MONOCYTES NFR BLD: 11.6 % (ref 4–15)
NEUTROPHILS # BLD AUTO: 3.2 K/UL (ref 1.8–7.7)
NEUTROPHILS NFR BLD: 55.4 % (ref 38–73)
PHOSPHATE SERPL-MCNC: 2.2 MG/DL (ref 2.7–4.5)
PLATELET # BLD AUTO: 208 K/UL (ref 150–350)
PMV BLD AUTO: 11.1 FL (ref 9.2–12.9)
POCT GLUCOSE: 132 MG/DL (ref 70–110)
POCT GLUCOSE: 93 MG/DL (ref 70–110)
POTASSIUM SERPL-SCNC: 3.5 MMOL/L (ref 3.5–5.1)
POTASSIUM SERPL-SCNC: 3.5 MMOL/L (ref 3.5–5.1)
RBC # BLD AUTO: 5.23 M/UL (ref 4.6–6.2)
SODIUM SERPL-SCNC: 140 MMOL/L (ref 136–145)
SODIUM SERPL-SCNC: 140 MMOL/L (ref 136–145)
TSH SERPL DL<=0.005 MIU/L-ACNC: 0.83 UIU/ML (ref 0.4–4)
WBC # BLD AUTO: 5.77 K/UL (ref 3.9–12.7)

## 2019-09-08 PROCEDURE — G0378 HOSPITAL OBSERVATION PER HR: HCPCS

## 2019-09-08 PROCEDURE — 36415 COLL VENOUS BLD VENIPUNCTURE: CPT

## 2019-09-08 PROCEDURE — 84443 ASSAY THYROID STIM HORMONE: CPT

## 2019-09-08 PROCEDURE — 83735 ASSAY OF MAGNESIUM: CPT

## 2019-09-08 PROCEDURE — 85025 COMPLETE CBC W/AUTO DIFF WBC: CPT

## 2019-09-08 PROCEDURE — 25000003 PHARM REV CODE 250: Performed by: NURSE PRACTITIONER

## 2019-09-08 PROCEDURE — 94761 N-INVAS EAR/PLS OXIMETRY MLT: CPT

## 2019-09-08 PROCEDURE — 80048 BASIC METABOLIC PNL TOTAL CA: CPT

## 2019-09-08 PROCEDURE — 84100 ASSAY OF PHOSPHORUS: CPT

## 2019-09-08 RX ORDER — ATORVASTATIN CALCIUM 40 MG/1
40 TABLET, FILM COATED ORAL NIGHTLY
Qty: 90 TABLET | Refills: 3 | Status: SHIPPED | OUTPATIENT
Start: 2019-09-08 | End: 2020-05-27

## 2019-09-08 RX ORDER — METOPROLOL TARTRATE 50 MG/1
100 TABLET ORAL 2 TIMES DAILY
Status: DISCONTINUED | OUTPATIENT
Start: 2019-09-08 | End: 2019-09-08 | Stop reason: HOSPADM

## 2019-09-08 RX ORDER — CLOPIDOGREL BISULFATE 75 MG/1
75 TABLET ORAL DAILY
Qty: 21 TABLET | Refills: 0 | Status: SHIPPED | OUTPATIENT
Start: 2019-09-09 | End: 2020-02-19

## 2019-09-08 RX ADMIN — ASPIRIN 81 MG: 81 TABLET, COATED ORAL at 08:09

## 2019-09-08 RX ADMIN — POTASSIUM CHLORIDE 40 MEQ: 20 TABLET, EXTENDED RELEASE ORAL at 08:09

## 2019-09-08 RX ADMIN — CLOPIDOGREL BISULFATE 75 MG: 75 TABLET ORAL at 08:09

## 2019-09-08 RX ADMIN — METOPROLOL TARTRATE 100 MG: 50 TABLET ORAL at 06:09

## 2019-09-08 RX ADMIN — LISINOPRIL 40 MG: 20 TABLET ORAL at 08:09

## 2019-09-08 RX ADMIN — FELODIPINE 10 MG: 2.5 TABLET, EXTENDED RELEASE ORAL at 08:09

## 2019-09-08 NOTE — PLAN OF CARE
Problem: Adult Inpatient Plan of Care  Goal: Plan of Care Review  Outcome: Ongoing (interventions implemented as appropriate)  18 gauge IV to right wrist removed without difficulty, catheter intact- no redness or swelling noted at time of removal. Tele monitor removed, cleaned, and returned to telemetry bunker. D/C instructions and medications reviewed with patient- verbalizes understanding. [facility] to be [facility] [place]

## 2019-09-08 NOTE — ASSESSMENT & PLAN NOTE
Dizziness-resolved  History of stroke  Hyperlipidemia  Hypertension  Non-intractable vomiting with nausea-resolved     --179  -Troponin: WNL  -Vascular Sx consulted while patient was in ED, per note, non-intractable vomiting with nausea without evidence of new neurological findings on exam. Doubt stroke now. Probably related to malignant hypertension. Admit to [facility] and get MRI brain.  -DAPT with plavix for 21 days  -Ambulatory referral to outpatient neurology  -CT of head: negative  -MRI: Suggestive of acute infarct  -MRA of head and Neck: No blockages  -continue home antihypertensives  -2D echo: EF 65%  -Lipid Panel: 127

## 2019-09-08 NOTE — PLAN OF CARE
Re:  Steven Shields, WORK / SCHOOL EXCUSE    Date: 09/08/2019           [facility] [place] [address] [place] [address]  Office: [phone]  Fax: [phone]     To whom it may concern:    Mr. Steven Shields has been hospitalized at the [facility] [place] 9/6/2019-9/8/2019.  Please excuse the patient from duties.  Patient may return on 9/9/2019.  No restrictions.     Please contact me if you have any questions.                __________________________  N'LISA Foster FNP-C   Nurse Practitioner- Hospitalist  Department of [facility] Medicine  [facility] [place] [facility]

## 2019-09-08 NOTE — DISCHARGE SUMMARY
"[facility] Medicine  Discharge Summary      Patient Name: Steven Shields  MRN: [id number]  Admission Date: 9/6/2019  Hospital Length of Stay: 0 days  Discharge Date and Time:  09/08/2019 4:21 PM  Attending Physician: No att. providers found   Discharging Provider: Berhane Saunders NP  Primary Care Provider: Julian Carson MD      HPI:   59 y.o. Male with past medical history of DM type 2, hyperlipidemia, HTN and CVA presents with dizziness that started yesterday night and has been persistent.  Patient states symptoms were similar to past CVA so he was very concerned and came to ED.  Reports waking up in the middle of the night,  feeling "funny" and when sitting all the way up, felt dizzy and "off balance."  Patient reports associated nausea and vomiting x1 episode NBNB.  Denies fever, chills, SOB, cough, chest pain, neck stiffness or any other focal deficits. He reports dizziness at rest that worsens when moving.  Denies aggravating or alleviating factors.  Findings: K+ 2.7, U/A negative, CT of head negative for acute changes, chest x-ray negative for acute changes, EKG negative for ischemic changes.  Patient admitted for further medical management.    * No surgery found *      Hospital Course:   9/7/19 MRI suggestive of an acute infarct, started DAPT, consulted neurology.  9/8/19  No complaints or concerns, focal deficits resolved, blood pressure improved, appreciate neurology recs, continue DAPT.  Patient hemodynamically stable and okay to discharge home and follow up with neurology as outpatient.     Consults:   Consults (From admission, onward)        Status Ordering Provider     Inpatient consult to Neurology  Once     Provider:  (Not yet assigned)    Completed BERHANE SAUNDERS          * Acute cerebral infarction  Dizziness-resolved  History of stroke  Hyperlipidemia  Hypertension  Non-intractable vomiting with nausea-resolved     --179  -Troponin: WNL  -Vascular Sx consulted " while patient was in ED, per note, non-intractable vomiting with nausea without evidence of new neurological findings on exam. Doubt stroke now. Probably related to malignant hypertension. Admit to [facility] and get MRI brain.  -DAPT with plavix for 21 days  -Ambulatory referral to outpatient neurology  -CT of head: negative  -MRI: Suggestive of acute infarct  -MRA of head and Neck: No blockages  -continue home antihypertensives  -2D echo: EF 65%  -Lipid Panel: 127        Type 2 diabetes mellitus  Hemoglobin A1c 5.9  Current BS: 106  POC glucose checks ac meals and nightly  Continue home medication regimen  Diabetic Diet  LDL: 127  Patient states he was taken off statin by PCP  Continue DAPT with statin              Hypertensive urgency-resolved as of 9/8/2019        Hypokalemia-resolved as of 9/8/2019  resolved        Final Active Diagnoses:    Diagnosis Date Noted POA    PRINCIPAL PROBLEM:  Acute cerebral infarction [I63.9] 09/08/2019 Yes    Hyperlipidemia [E78.5]  Yes    Type 2 diabetes mellitus [E11.9] 10/19/2013 Yes    Hypertension [I10] 02/07/2013 Yes    History of stroke [Z86.73] 11/19/2011 Not Applicable      Problems Resolved During this Admission:    Diagnosis Date Noted Date Resolved POA    Non-intractable vomiting with nausea [R11.2] 09/06/2019 09/07/2019 Yes    Altered mental status [R41.82] 09/06/2019 09/06/2019 Yes    Hypertensive urgency [I16.0] 09/06/2019 09/08/2019 Yes    Hypokalemia [E87.6] 10/19/2013 09/08/2019 Yes    Dizziness [R42] 10/19/2013 09/07/2019 Yes       Discharged Condition: stable    Disposition: Home or Self Care    Follow Up:  Follow-up Information     Julian Carson MD. Schedule an appointment as soon as possible for a visit in 1 week.    Specialty:  Internal Medicine  Why:  Offices closed for Weekend. Patient to schedule own follow up appointment.   Contact information:  [address] [address] [phone]             [facility] - Neurology.  Schedule an appointment as soon as possible for a visit in 2 weeks.    Specialty:  Neurology  Why:  Ambulatory Referral placed with Neurology / Offices closed for Weekend. Patient to schedule own follow up appointment.   Contact information:  [address], [address]  [facility] [address] [phone]  Additional information:  [address]nd Floor [facility], [address]               Patient Instructions:      Ambulatory Referral to Neurology   Referral Priority: Routine Referral Type: Consultation   Referral Reason: Specialty Services Required   Requested Specialty: Neurology   Number of Visits Requested: 1     Diet Cardiac     Diet diabetic     Notify your health care provider if you experience any of the following:  temperature >100.4     Notify your health care provider if you experience any of the following:  persistent nausea and vomiting or diarrhea     Notify your health care provider if you experience any of the following:  severe uncontrolled pain     Notify your health care provider if you experience any of the following:  redness, tenderness, or signs of infection (pain, swelling, redness, odor or green/yellow discharge around incision site)     Notify your health care provider if you experience any of the following:  difficulty breathing or increased cough     Notify your health care provider if you experience any of the following:  severe persistent headache     Notify your health care provider if you experience any of the following:  worsening rash     Notify your health care provider if you experience any of the following:  persistent dizziness, light-headedness, or visual disturbances     Notify your health care provider if you experience any of the following:  increased confusion or weakness     Activity as tolerated       Significant Diagnostic Studies: Labs:   BMP:   Recent Labs   Lab 09/07/19  0516 09/08/19  0510   GLU 87  87 106  106     142 140  140   K 3.2*  3.2* 3.5  3.5      107 106  106   CO2 28  28 26  26   BUN 14  14 14  14   CREATININE 1.1  1.1 1.1  1.1   CALCIUM 8.6*  8.6* 8.9  8.9   MG 1.8 1.8   , CMP   Recent Labs   Lab 09/07/19  0516 09/08/19  0510     142 140  140   K 3.2*  3.2* 3.5  3.5     107 106  106   CO2 28  28 26  26   GLU 87  87 106  106   BUN 14  14 14  14   CREATININE 1.1  1.1 1.1  1.1   CALCIUM 8.6*  8.6* 8.9  8.9   ANIONGAP 7*  7* 8  8   ESTGFRAFRICA >60  >60 >60  >60   EGFRNONAA >60  >60 >60  >60   , CBC   Recent Labs   Lab 09/07/19  0516 09/08/19  0510   WBC 8.11 5.77   HGB 12.7* 14.1   HCT 38.8* 41.9    208   , Lipid Panel   Lab Results   Component Value Date    CHOL 188 09/07/2019    HDL 34 (L) 09/07/2019    LDLCALC 127.0 09/07/2019    TRIG 135 09/07/2019    CHOLHDL 18.1 (L) 09/07/2019   , Troponin   Recent Labs   Lab 09/06/19  1045   TROPONINI 0.053*    and A1C:   Recent Labs   Lab 09/06/19  0916   HGBA1C 5.9*  5.9*       Pending Diagnostic Studies:     None         Medications:  Reconciled Home Medications:      Medication List      START taking these medications    atorvastatin 40 MG tablet  Commonly known as:  LIPITOR  Take 1 tablet (40 mg total) by mouth nightly.     clopidogrel 75 mg tablet  Commonly known as:  PLAVIX  Take 1 tablet (75 mg total) by mouth once daily. for 21 days  Start taking on:  9/9/2019        CONTINUE taking these medications    aspirin 81 MG EC tablet  Commonly known as:  ECOTRIN  Take 1 tablet (81 mg total) by mouth once daily.     blood sugar diagnostic Strp  Commonly known as:  BLOOD GLUCOSE TEST  TEST BLOOD GLUCOSE THREE TIMES DAILY     blood-glucose meter Misc  Commonly known as:  TRUE METRIX GLUCOSE METER  DISPENSE ONE BLOOD GLUCOSE METER     felodipine 10 MG 24 hr tablet  Commonly known as:  PLENDIL  Take 1 tablet (10 mg total) by mouth once daily.     insulin glargine 100 units/mL (3mL) SubQ pen  Inject 16 Units into the skin every evening.     lancets 33 gauge Misc  Commonly  "known as:  TRUEPLUS LANCETS  TEST BLOOD GLUCOSE THREE TIMES DAILY     lisinopril 40 MG tablet  Commonly known as:  PRINIVIL,ZESTRIL  Take 1 tablet (40 mg total) by mouth once daily.     metFORMIN 500 MG tablet  Commonly known as:  GLUCOPHAGE  TAKE 1 TABLET BY MOUTH TWICE A DAY WITH A MEAL     metoprolol tartrate 100 MG tablet  Commonly known as:  LOPRESSOR  TAKE 1 TABLET BY MOUTH TWICE A DAY     pen needle, diabetic 31 gauge x 5/16" Ndle     sildenafil 100 MG tablet  Commonly known as:  VIAGRA  Take 1 tablet (100 mg total) by mouth daily as needed for Erectile Dysfunction. Dispense generic            Indwelling Lines/Drains at time of discharge:   Lines/Drains/Airways          None          Time spent on the discharge of patient: 45 minutes  Patient was seen and examined on the date of discharge and determined to be suitable for discharge.         Felicity Bedolla NP  Department of Hospital Medicine  [facility]  "

## 2019-09-08 NOTE — ASSESSMENT & PLAN NOTE
Hemoglobin A1c 5.9  Current BS: 106  POC glucose checks ac meals and nightly  Continue home medication regimen  Diabetic Diet  LDL: 127  Patient states he was taken off statin by PCP  Continue DAPT with statin

## 2019-09-08 NOTE — PLAN OF CARE
TN contacted by floor nurse Jaqueline, pt requires transportation home. TN contacted Case Mgmt Supervisor, she gave permission to arrange transport with [facility]. TN placed order with Patient Flow [facility] [phone] option 5.  time requested for 1:00 pm.    Discharge orders noted, no HH or HME ordered.    Pt's nurse will go over medications/signs and symptoms prior to discharge       09/08/19 0910   Final Note   Assessment Type Final Discharge Note   Anticipated Discharge Disposition Home   What phone number can be called within the next 1-3 days to see how you are doing after discharge? [phone]   Hospital Follow Up  Appt(s) scheduled? No  (Offices closed for Weekend. Patient to schedule own follow up appointment. )   Right Care Referral Info   Post Acute Recommendation No Care     Delia Chirinos, RN Transitional Navigator  [phone]

## 2019-09-08 NOTE — PLAN OF CARE
Problem: Adult Inpatient Plan of Care  Goal: Plan of Care Review  Outcome: Ongoing (interventions implemented as appropriate)  Plan of care reviewed with patient, understanding verbalized. Pt remains SR on tele. BG monitored and SSI administered per orders. No acute distress noted. . Instructed to call for any assistance, understanding verbalized.  Bed alarm on, call light in reach, fall precautions in place. Will continue to monitor.

## 2019-09-11 ENCOUNTER — PATIENT OUTREACH (OUTPATIENT)
Dept: ADMINISTRATIVE | Facility: HOSPITAL | Age: 59
End: 2019-09-11

## 2019-10-25 ENCOUNTER — PATIENT OUTREACH (OUTPATIENT)
Dept: ADMINISTRATIVE | Facility: HOSPITAL | Age: 59
End: 2019-10-25

## 2019-11-18 RX ORDER — FELODIPINE 10 MG/1
TABLET, EXTENDED RELEASE ORAL
Qty: 30 TABLET | Refills: 5 | OUTPATIENT
Start: 2019-11-18

## 2019-12-17 RX ORDER — FELODIPINE 10 MG/1
TABLET, EXTENDED RELEASE ORAL
Qty: 30 TABLET | Refills: 0 | Status: SHIPPED | OUTPATIENT
Start: 2019-12-17 | End: 2020-01-22

## 2019-12-17 NOTE — TELEPHONE ENCOUNTER
"Dr Carson's message "Needs follow up in next 30 days or no more refills"    Called patient to assist with scheduling an appt. Informed patient that the appt will not be with Dr Carson due to his transitioning and the fact that the patient said he will be out of town and wants an appt in mid January of 2020.  Patient said he doesn't want to follow now.  He will call back to schedule.  "

## 2019-12-20 RX ORDER — LISINOPRIL 40 MG/1
TABLET ORAL
Qty: 30 TABLET | Refills: 1 | Status: SHIPPED | OUTPATIENT
Start: 2019-12-20 | End: 2020-01-22 | Stop reason: SDUPTHER

## 2019-12-20 NOTE — TELEPHONE ENCOUNTER
lvm to call us back  To schedule a appt to est care with a new provider in the next 30 days as PCP rec.

## 2020-01-16 ENCOUNTER — TELEPHONE (OUTPATIENT)
Dept: INTERNAL MEDICINE | Facility: CLINIC | Age: 60
End: 2020-01-16

## 2020-01-16 NOTE — TELEPHONE ENCOUNTER
titam to call us back and let us know what refill he is requesting    Pt already has an appt with Dr. Page on 02/18

## 2020-01-16 NOTE — TELEPHONE ENCOUNTER
----- Message from Devante Pastor sent at 1/16/2020  2:47 PM CST -----  Contact: pt  Name of Who is Calling: pt    What is the request in detail: is a previous patient of Dr. Carson and states he will be out of his Rx medication before his scheduled appointment 02/18/20. Please contact to further discuss and advise      Can the clinic reply by [facility]: no     What Number to Call Back if not in [facility]: [phone]

## 2020-01-22 DIAGNOSIS — E11.42 TYPE 2 DIABETES MELLITUS WITH DIABETIC POLYNEUROPATHY, WITH LONG-TERM CURRENT USE OF INSULIN: ICD-10-CM

## 2020-01-22 DIAGNOSIS — I10 ESSENTIAL HYPERTENSION: Primary | ICD-10-CM

## 2020-01-22 DIAGNOSIS — Z79.4 TYPE 2 DIABETES MELLITUS WITH DIABETIC POLYNEUROPATHY, WITH LONG-TERM CURRENT USE OF INSULIN: ICD-10-CM

## 2020-01-22 RX ORDER — LISINOPRIL 40 MG/1
40 TABLET ORAL DAILY
Qty: 30 TABLET | Refills: 0 | Status: SHIPPED | OUTPATIENT
Start: 2020-01-22 | End: 2020-01-27 | Stop reason: SDUPTHER

## 2020-01-22 RX ORDER — METOPROLOL TARTRATE 100 MG/1
100 TABLET ORAL 2 TIMES DAILY
Qty: 60 TABLET | Refills: 0 | Status: SHIPPED | OUTPATIENT
Start: 2020-01-22 | End: 2020-01-27 | Stop reason: SDUPTHER

## 2020-01-22 RX ORDER — FELODIPINE 10 MG/1
TABLET, EXTENDED RELEASE ORAL
Qty: 30 TABLET | Refills: 0 | Status: SHIPPED | OUTPATIENT
Start: 2020-01-22 | End: 2020-01-27 | Stop reason: SDUPTHER

## 2020-01-22 RX ORDER — METFORMIN HYDROCHLORIDE 500 MG/1
TABLET ORAL
Qty: 60 TABLET | Refills: 0 | Status: SHIPPED | OUTPATIENT
Start: 2020-01-22 | End: 2020-01-27 | Stop reason: SDUPTHER

## 2020-01-22 NOTE — TELEPHONE ENCOUNTER
----- Message from Eliane Marsh sent at 1/22/2020 12:56 PM CST -----  Contact: Pt  Can the clinic reply in [facility]: no      Please refill the medication(s) listed below. The patient can be reached at this phone number ([phone]__) once it is called into the pharmacy.    Medication #1lisinopril (PRINIVIL,ZESTRIL) 40 MG tablet    Medication #2 metoprolol tartrate (LOPRESSOR) 100 MG tablet    Medication 3 metFORMIN (GLUCOPHAGE) 500 MG tablet    Preferred Pharmacy:[facility] #[id number] - [address] [place]

## 2020-01-26 ENCOUNTER — NURSE TRIAGE (OUTPATIENT)
Dept: ADMINISTRATIVE | Facility: CLINIC | Age: 60
End: 2020-01-26

## 2020-01-26 DIAGNOSIS — I10 ESSENTIAL HYPERTENSION: ICD-10-CM

## 2020-01-26 DIAGNOSIS — E11.42 TYPE 2 DIABETES MELLITUS WITH DIABETIC POLYNEUROPATHY, WITH LONG-TERM CURRENT USE OF INSULIN: ICD-10-CM

## 2020-01-26 DIAGNOSIS — Z79.4 TYPE 2 DIABETES MELLITUS WITH DIABETIC POLYNEUROPATHY, WITH LONG-TERM CURRENT USE OF INSULIN: ICD-10-CM

## 2020-01-26 NOTE — TELEPHONE ENCOUNTER
"Patient called in with concerns that he is out of htn and dm meds. Pt reports his meds are on hold b/c his pcp no longer works [facility] and a different drPedro Sent the meds to the pharmacy. The meds were sent in  For 30 days. The patient's insurance will only pay for 90 day supply so the patient's meds need to be sent in for 90 days. I contacted  who reported she doesn't see anyone on call for Dr. Abraham. I contacted the pharmacy to inquire about an emergency dose. Per caridad at the pharmacy, they can't issue an emergency dose but the pharmacy will allow the patient to pay for a few day's worth of medication until this issue is resolved. The patient reports he will go in to the pharmacy to purchase a few days of medication. Patient was advised to call back with any questions/concerns or symptoms and verbalized understanding.     Reason for Disposition   Medication questions   [1] Request for URGENT new prescription or refill of "essential" medication (i.e., likelihood of harm to patient if not taken) AND [2] triager unable to fill per unit policy    Additional Information   Negative: [1] Caller is not with the adult (patient) AND [2] reporting urgent symptoms   Negative: Lab result questions   Negative: Caller can't be reached by phone   Negative: Caller has already spoken to PCP or another triager   Negative: RN needs further essential information from caller in order to complete triage   Negative: Requesting regular office appointment   Negative: [1] Caller requesting NON-URGENT health information AND [2] PCP's office is the best resource   Negative: Drug overdose and nurse unable to answer question   Negative: Caller requesting information not related to medicine   Negative: Caller requesting a prescription for Strep throat and has a positive culture result   Negative: Rash while taking a medication or within 3 days of stopping it   Negative: Immunization reaction suspected   Negative: " [1] Asthma AND [2] having symptoms of asthma (cough, wheezing, etc)   Negative: MORE THAN A DOUBLE DOSE of a prescription or over-the-counter (OTC) drug   Negative: [1] DOUBLE DOSE (an extra dose or lesser amount) of over-the-counter (OTC) drug AND [2] any symptoms (e.g., dizziness, nausea, pain, sleepiness)   Negative: [1] DOUBLE DOSE (an extra dose or lesser amount) of prescription drug AND [2] any symptoms (e.g., dizziness, nausea, pain, sleepiness)   Negative: Took another person's prescription drug   Negative: [1] DOUBLE DOSE (an extra dose or lesser amount) of prescription drug AND [2] NO symptoms (Exception: a double dose of antibiotics)   Negative: Diabetes drug error or overdose (e.g., insulin or extra dose)    Protocols used: INFORMATION ONLY CALL-A-AH, MEDICATION QUESTION CALL-A-AH

## 2020-01-27 ENCOUNTER — NURSE TRIAGE (OUTPATIENT)
Dept: ADMINISTRATIVE | Facility: CLINIC | Age: 60
End: 2020-01-27

## 2020-01-27 DIAGNOSIS — I10 ESSENTIAL HYPERTENSION: ICD-10-CM

## 2020-01-27 DIAGNOSIS — E11.42 TYPE 2 DIABETES MELLITUS WITH DIABETIC POLYNEUROPATHY, WITH LONG-TERM CURRENT USE OF INSULIN: ICD-10-CM

## 2020-01-27 DIAGNOSIS — Z79.4 TYPE 2 DIABETES MELLITUS WITH DIABETIC POLYNEUROPATHY, WITH LONG-TERM CURRENT USE OF INSULIN: ICD-10-CM

## 2020-01-27 RX ORDER — FELODIPINE 10 MG/1
10 TABLET, EXTENDED RELEASE ORAL DAILY
Qty: 90 TABLET | Refills: 1 | Status: SHIPPED | OUTPATIENT
Start: 2020-01-27 | End: 2020-01-27 | Stop reason: SDUPTHER

## 2020-01-27 RX ORDER — LISINOPRIL 40 MG/1
40 TABLET ORAL DAILY
Qty: 90 TABLET | Refills: 1 | Status: SHIPPED | OUTPATIENT
Start: 2020-01-27 | End: 2020-05-27 | Stop reason: SDUPTHER

## 2020-01-27 RX ORDER — METFORMIN HYDROCHLORIDE 500 MG/1
TABLET ORAL
Qty: 180 TABLET | Refills: 1 | Status: SHIPPED | OUTPATIENT
Start: 2020-01-27 | End: 2020-05-27 | Stop reason: SDUPTHER

## 2020-01-27 RX ORDER — METFORMIN HYDROCHLORIDE 500 MG/1
TABLET ORAL
Qty: 180 TABLET | Refills: 1 | Status: SHIPPED | OUTPATIENT
Start: 2020-01-27 | End: 2020-01-27 | Stop reason: SDUPTHER

## 2020-01-27 RX ORDER — FELODIPINE 10 MG/1
10 TABLET, EXTENDED RELEASE ORAL DAILY
Qty: 90 TABLET | Refills: 1 | Status: SHIPPED | OUTPATIENT
Start: 2020-01-27 | End: 2020-05-27 | Stop reason: SDUPTHER

## 2020-01-27 RX ORDER — LISINOPRIL 40 MG/1
40 TABLET ORAL DAILY
Qty: 90 TABLET | Refills: 1 | Status: SHIPPED | OUTPATIENT
Start: 2020-01-27 | End: 2020-01-27 | Stop reason: SDUPTHER

## 2020-01-27 RX ORDER — METOPROLOL TARTRATE 100 MG/1
100 TABLET ORAL 2 TIMES DAILY
Qty: 180 TABLET | Refills: 1 | Status: SHIPPED | OUTPATIENT
Start: 2020-01-27 | End: 2020-05-27 | Stop reason: SDUPTHER

## 2020-01-27 RX ORDER — METOPROLOL TARTRATE 100 MG/1
100 TABLET ORAL 2 TIMES DAILY
Qty: 180 TABLET | Refills: 1 | Status: SHIPPED | OUTPATIENT
Start: 2020-01-27 | End: 2020-01-27 | Stop reason: SDUPTHER

## 2020-01-27 NOTE — TELEPHONE ENCOUNTER
----- Message from Ranjana Tai sent at 1/27/2020  5:13 PM CST -----  Contact: patient   Patient is requesting to get the prescriptions that was sent today to [facility] be sent to  [facility] in [place].     Please call patient at [phone] to confirm.

## 2020-01-27 NOTE — TELEPHONE ENCOUNTER
Contacted patient and states he would like 90 day supply of metoprolol, metformin, lisinopril and felodipine. Insurance will only cover 90 day supply and is currently out of all medications. Went to pharmacy to get an emergency supply because he is currently out of all medications. Medications pended and sent to covering provider.

## 2020-01-28 NOTE — TELEPHONE ENCOUNTER
Reason for Disposition   Caller requesting a refill, no triage required, and triager able to refill per unit policy    Protocols used: MEDICATION QUESTION CALL-A-RUPA    Steven was asking about his refills on medications. Advised they were sent to [facility] in [place].

## 2020-02-17 RX ORDER — INSULIN GLARGINE 100 [IU]/ML
INJECTION, SOLUTION SUBCUTANEOUS
Qty: 15 SYRINGE | Refills: 2 | Status: SHIPPED | OUTPATIENT
Start: 2020-02-17 | End: 2020-06-08 | Stop reason: SDUPTHER

## 2020-02-19 ENCOUNTER — OFFICE VISIT (OUTPATIENT)
Dept: INTERNAL MEDICINE | Facility: CLINIC | Age: 60
End: 2020-02-19
Attending: FAMILY MEDICINE
Payer: COMMERCIAL

## 2020-02-19 ENCOUNTER — CLINICAL SUPPORT (OUTPATIENT)
Dept: INTERNAL MEDICINE | Facility: CLINIC | Age: 60
End: 2020-02-19
Payer: COMMERCIAL

## 2020-02-19 VITALS
HEART RATE: 72 BPM | DIASTOLIC BLOOD PRESSURE: 82 MMHG | SYSTOLIC BLOOD PRESSURE: 136 MMHG | HEIGHT: 69 IN | TEMPERATURE: 98 F | OXYGEN SATURATION: 96 % | WEIGHT: 231.38 LBS | BODY MASS INDEX: 34.27 KG/M2

## 2020-02-19 DIAGNOSIS — E11.8 TYPE 2 DIABETES MELLITUS WITH COMPLICATION, WITH LONG-TERM CURRENT USE OF INSULIN: Primary | ICD-10-CM

## 2020-02-19 DIAGNOSIS — Z79.4 TYPE 2 DIABETES MELLITUS WITH COMPLICATION, WITH LONG-TERM CURRENT USE OF INSULIN: Primary | ICD-10-CM

## 2020-02-19 DIAGNOSIS — E78.5 HYPERLIPIDEMIA, UNSPECIFIED HYPERLIPIDEMIA TYPE: ICD-10-CM

## 2020-02-19 DIAGNOSIS — R35.0 FREQUENT URINATION: ICD-10-CM

## 2020-02-19 DIAGNOSIS — I10 ESSENTIAL HYPERTENSION: ICD-10-CM

## 2020-02-19 LAB
ALBUMIN SERPL BCP-MCNC: 3.7 G/DL (ref 3.5–5.2)
ALP SERPL-CCNC: 84 U/L (ref 55–135)
ALT SERPL W/O P-5'-P-CCNC: 14 U/L (ref 10–44)
ANION GAP SERPL CALC-SCNC: 8 MMOL/L (ref 8–16)
AST SERPL-CCNC: 20 U/L (ref 10–40)
BACTERIA #/AREA URNS AUTO: ABNORMAL /HPF
BILIRUB SERPL-MCNC: 0.3 MG/DL (ref 0.1–1)
BILIRUB UR QL STRIP: NEGATIVE
BUN SERPL-MCNC: 16 MG/DL (ref 8–23)
CALCIUM SERPL-MCNC: 9.1 MG/DL (ref 8.7–10.5)
CHLORIDE SERPL-SCNC: 105 MMOL/L (ref 95–110)
CHOLEST SERPL-MCNC: 218 MG/DL (ref 120–199)
CHOLEST/HDLC SERPL: 5.3 {RATIO} (ref 2–5)
CLARITY UR REFRACT.AUTO: ABNORMAL
CO2 SERPL-SCNC: 29 MMOL/L (ref 23–29)
COLOR UR AUTO: ABNORMAL
COMPLEXED PSA SERPL-MCNC: 2.7 NG/ML (ref 0–4)
CREAT SERPL-MCNC: 1.1 MG/DL (ref 0.5–1.4)
EST. GFR  (AFRICAN AMERICAN): >60 ML/MIN/1.73 M^2
EST. GFR  (NON AFRICAN AMERICAN): >60 ML/MIN/1.73 M^2
ESTIMATED AVG GLUCOSE: 114 MG/DL (ref 68–131)
GLUCOSE SERPL-MCNC: 86 MG/DL (ref 70–110)
GLUCOSE UR QL STRIP: NEGATIVE
HBA1C MFR BLD HPLC: 5.6 % (ref 4–5.6)
HDLC SERPL-MCNC: 41 MG/DL (ref 40–75)
HDLC SERPL: 18.8 % (ref 20–50)
HGB UR QL STRIP: NEGATIVE
HYALINE CASTS UR QL AUTO: 5 /LPF
KETONES UR QL STRIP: NEGATIVE
LDLC SERPL CALC-MCNC: 138.4 MG/DL (ref 63–159)
LEUKOCYTE ESTERASE UR QL STRIP: NEGATIVE
MICROSCOPIC COMMENT: ABNORMAL
NITRITE UR QL STRIP: NEGATIVE
NONHDLC SERPL-MCNC: 177 MG/DL
PH UR STRIP: 5 [PH] (ref 5–8)
POTASSIUM SERPL-SCNC: 3.3 MMOL/L (ref 3.5–5.1)
PROT SERPL-MCNC: 7.6 G/DL (ref 6–8.4)
PROT UR QL STRIP: ABNORMAL
RBC #/AREA URNS AUTO: 2 /HPF (ref 0–4)
SODIUM SERPL-SCNC: 142 MMOL/L (ref 136–145)
SP GR UR STRIP: 1.02 (ref 1–1.03)
SQUAMOUS #/AREA URNS AUTO: 1 /HPF
TRIGL SERPL-MCNC: 193 MG/DL (ref 30–150)
URN SPEC COLLECT METH UR: ABNORMAL
WBC #/AREA URNS AUTO: 10 /HPF (ref 0–5)

## 2020-02-19 PROCEDURE — 3075F SYST BP GE 130 - 139MM HG: CPT | Mod: CPTII,S$GLB,, | Performed by: FAMILY MEDICINE

## 2020-02-19 PROCEDURE — 99999 PR PBB SHADOW E&M-EST. PATIENT-LVL IV: CPT | Mod: PBBFAC,,, | Performed by: FAMILY MEDICINE

## 2020-02-19 PROCEDURE — 84153 ASSAY OF PSA TOTAL: CPT

## 2020-02-19 PROCEDURE — 80053 COMPREHEN METABOLIC PANEL: CPT

## 2020-02-19 PROCEDURE — 81001 URINALYSIS AUTO W/SCOPE: CPT

## 2020-02-19 PROCEDURE — 99213 OFFICE O/P EST LOW 20 MIN: CPT | Mod: S$GLB,,, | Performed by: FAMILY MEDICINE

## 2020-02-19 PROCEDURE — 3008F PR BODY MASS INDEX (BMI) DOCUMENTED: ICD-10-PCS | Mod: CPTII,S$GLB,, | Performed by: FAMILY MEDICINE

## 2020-02-19 PROCEDURE — 3044F PR MOST RECENT HEMOGLOBIN A1C LEVEL <7.0%: ICD-10-PCS | Mod: CPTII,S$GLB,, | Performed by: FAMILY MEDICINE

## 2020-02-19 PROCEDURE — 99999 PR PBB SHADOW E&M-EST. PATIENT-LVL IV: ICD-10-PCS | Mod: PBBFAC,,, | Performed by: FAMILY MEDICINE

## 2020-02-19 PROCEDURE — 80061 LIPID PANEL: CPT

## 2020-02-19 PROCEDURE — 99213 PR OFFICE/OUTPT VISIT, EST, LEVL III, 20-29 MIN: ICD-10-PCS | Mod: S$GLB,,, | Performed by: FAMILY MEDICINE

## 2020-02-19 PROCEDURE — 3079F PR MOST RECENT DIASTOLIC BLOOD PRESSURE 80-89 MM HG: ICD-10-PCS | Mod: CPTII,S$GLB,, | Performed by: FAMILY MEDICINE

## 2020-02-19 PROCEDURE — 3079F DIAST BP 80-89 MM HG: CPT | Mod: CPTII,S$GLB,, | Performed by: FAMILY MEDICINE

## 2020-02-19 PROCEDURE — 3044F HG A1C LEVEL LT 7.0%: CPT | Mod: CPTII,S$GLB,, | Performed by: FAMILY MEDICINE

## 2020-02-19 PROCEDURE — 3075F PR MOST RECENT SYSTOLIC BLOOD PRESS GE 130-139MM HG: ICD-10-PCS | Mod: CPTII,S$GLB,, | Performed by: FAMILY MEDICINE

## 2020-02-19 PROCEDURE — 3008F BODY MASS INDEX DOCD: CPT | Mod: CPTII,S$GLB,, | Performed by: FAMILY MEDICINE

## 2020-02-19 PROCEDURE — 83036 HEMOGLOBIN GLYCOSYLATED A1C: CPT

## 2020-02-19 NOTE — LETTER
February 20, 2020      Julian Carson MD  [address] [address]           [facility] - Family Medicine/ Internal Medicine  [address] [place] [id number]  Phone: [phone]  Fax: [phone]          Patient: Steven Shields   MR Number: [id number]   YOB: 1960   Date of Visit: 2/19/2020       Dear Dr. Julian Carson:    Thank you for referring Steven Shields to me for evaluation. Attached you will find relevant portions of my assessment and plan of care.    If you have questions, please do not hesitate to call me. I look forward to following Steven Shields along with you.    Sincerely,    James Akers MD    Enclosure  CC:  No Recipients    If you would like to receive this communication electronically, please contact [email or web address] or [phone] to request more information on [employer] access.    For providers and/or their staff who would like to refer a patient to [facility], please contact us through our one-stop-shop provider referral line, [facility], at [phone].    If you feel you have received this communication in error or would no longer like to receive these types of communications, please e-mail [email or web address]

## 2020-02-20 ENCOUNTER — TELEPHONE (OUTPATIENT)
Dept: INTERNAL MEDICINE | Facility: CLINIC | Age: 60
End: 2020-02-20

## 2020-02-20 DIAGNOSIS — N30.90 CYSTITIS: Primary | ICD-10-CM

## 2020-02-20 RX ORDER — CEFDINIR 300 MG/1
300 CAPSULE ORAL 2 TIMES DAILY
Qty: 14 CAPSULE | Refills: 0 | Status: SHIPPED | OUTPATIENT
Start: 2020-02-20 | End: 2020-02-27

## 2020-02-20 NOTE — TELEPHONE ENCOUNTER
Please let the patient know his labs have been reviewed.  His blood sugar and cholesterol are well-controlled, so we can continue his current regimen.  His kidney and liver function are normal, and his prostate level is normal as well.  His urine sample is showing sign of infection however, so let's start him on a course of antibiotic and see if this helps him to urinate less often.  New Rx sent to [facility].  If he is not feeling any better next week, he can call the office for additional testing.  Thank you.

## 2020-02-20 NOTE — PROGRESS NOTES
[facility]  [facility] Family Medicine/ Internal Medicine  Clinic Note      Subjective:       Patient ID: Steven Shields is a 60 y.o. male.    Chief Complaint: Establish Care    New patient is here today to establish care.  He has chronic diabetes, HTN, and HLD.  He is compliant with his current medication regimen, and tolerating treatment well.  He notes history of frequent urination, worse recently.  He does not check his blood sugars at home.  No recent illness or fever.  He has history of prior CVA, without any ongoing chronic neurologic deficit.  Past medical, surgical, family, social history reviewed.      Diabetes   He presents for his follow-up diabetic visit. He has type 2 diabetes mellitus. His disease course has been stable. There are no hypoglycemic associated symptoms. Pertinent negatives for hypoglycemia include no dizziness or headaches. Associated symptoms include polyuria. Pertinent negatives for diabetes include no chest pain, no fatigue, no foot paresthesias, no foot ulcerations, no polydipsia and no visual change. There are no hypoglycemic complications. Symptoms are stable. Diabetic complications include a CVA. Pertinent negatives for diabetic complications include no heart disease. Risk factors for coronary artery disease include dyslipidemia, diabetes mellitus, hypertension, male sex and sedentary lifestyle. Current diabetic treatment includes insulin injections and oral agent (monotherapy). He is compliant with treatment all of the time. He is following a generally unhealthy diet. When asked about meal planning, he reported none. He participates in exercise intermittently. An ACE inhibitor/angiotensin II receptor blocker is being taken. He sees a podiatrist.Eye exam is current.   Hypertension   This is a chronic problem. The problem is unchanged. The problem is uncontrolled. Pertinent negatives include no anxiety, chest pain, headaches, malaise/fatigue, palpitations, peripheral  "edema or shortness of breath. Past treatments include calcium channel blockers, ACE inhibitors and beta blockers. The current treatment provides moderate improvement. There are no compliance problems.  Hypertensive end-organ damage includes CVA. There is no history of kidney disease or CAD/MI.     Review of Systems   Constitutional: Negative for fatigue, fever and malaise/fatigue.   HENT: Negative for congestion, rhinorrhea and sore throat.    Respiratory: Negative for cough and shortness of breath.    Cardiovascular: Negative for chest pain and palpitations.   Gastrointestinal: Negative for abdominal pain, diarrhea, nausea and vomiting.   Endocrine: Positive for polyuria. Negative for polydipsia.   Genitourinary: Positive for urgency. Negative for dysuria and hematuria.   Neurological: Negative for dizziness, syncope and headaches.       Objective:      /82   Pulse 72   Temp 98.2 °F (36.8 °C) (Oral)   Ht 5' 9" (1.753 m)   Wt 104.9 kg (231 lb 6 oz)   SpO2 96%   BMI 34.17 kg/m²   Physical Exam   Constitutional: He is oriented to person, place, and time. He appears well-developed and well-nourished. No distress.   HENT:   Head: Normocephalic and atraumatic.   Right Ear: Tympanic membrane and ear canal normal. Tympanic membrane is not erythematous. No middle ear effusion.   Left Ear: Tympanic membrane and ear canal normal. Tympanic membrane is not erythematous.  No middle ear effusion.   Nose: Nose normal. No mucosal edema or rhinorrhea.   Mouth/Throat: Oropharynx is clear and moist and mucous membranes are normal.   Eyes: Pupils are equal, round, and reactive to light. Conjunctivae are normal.   Neck: Normal range of motion. No thyromegaly present.   Cardiovascular: Normal rate and regular rhythm.   No murmur heard.  Pulmonary/Chest: Effort normal and breath sounds normal. No respiratory distress. He has no wheezes. He has no rales.   Abdominal: Soft. Bowel sounds are normal. He exhibits no distension. " There is no tenderness. There is no guarding.   Musculoskeletal: Normal range of motion. He exhibits no edema.   Neurological: He is alert and oriented to person, place, and time. No cranial nerve deficit.   Skin: Skin is warm and dry. No rash noted.   Psychiatric: He has a normal mood and affect. His behavior is normal.   Nursing note and vitals reviewed.      Assessment:       1. Type 2 diabetes mellitus with complication, with long-term current use of insulin    2. Hyperlipidemia, unspecified hyperlipidemia type    3. Essential hypertension    4. Frequent urination        Plan:     1. Type 2 diabetes mellitus with complication, with long-term current use of insulin  2. Hyperlipidemia, unspecified hyperlipidemia type  3. Essential hypertension  4. Frequent urination  - chronic DM and HTN, will recheck labs and adjust regimen as needed  - - Urinalysis, Reflex to Urine Culture Urine, Clean Catch  - - blood sugar diagnostic (BLOOD GLUCOSE TEST) Strp; TEST BLOOD GLUCOSE THREE TIMES DAILY  Dispense: 200 strip; Refill: 5  - - Hemoglobin A1c  - - Comprehensive metabolic panel  - - Diabetic Eye Screening Photo; Future  - - Ambulatory referral/consult to Optometry; Future  - - Ambulatory referral/consult to Podiatry; Future  - - Lipid panel  - - PSA, Screening  - diet and exercise lifestyle modifications reviewed and recommended  - questions answered, warning signs reviewed, patient is comfortable with this plan and was encouraged to call the office for any concerns or worsening of condition    - Follow up in about 2 weeks (around 3/4/2020) for nurse visit for BP check.     James Akers MD  2/20/2020          Medication List with Changes/Refills   New Medications    BLOOD-GLUCOSE METER MISC    Test daily as directed    LANCETS MISC    Use to test blood gulcose 3 times a day   Current Medications    ASPIRIN (ECOTRIN) 81 MG EC TABLET    Take 1 tablet (81 mg total) by mouth once daily.    ATORVASTATIN (LIPITOR) 40 MG  "TABLET    Take 1 tablet (40 mg total) by mouth nightly.    BASAGLAR KWIKPEN U-100 INSULIN GLARGINE 100 UNITS/ML (3ML) SUBQ PEN    INJECT 16 UNITS INTO THE SKIN EVERY EVENING.    BLOOD-GLUCOSE METER (TRUE METRIX GLUCOSE METER) MISC    DISPENSE ONE BLOOD GLUCOSE METER    FELODIPINE (PLENDIL) 10 MG 24 HR TABLET    Take 1 tablet (10 mg total) by mouth once daily.    LANCETS (TRUEPLUS LANCETS) 33 GAUGE [facility]    TEST BLOOD GLUCOSE THREE TIMES DAILY    LISINOPRIL (PRINIVIL,ZESTRIL) 40 MG TABLET    Take 1 tablet (40 mg total) by mouth once daily.    METFORMIN (GLUCOPHAGE) 500 MG TABLET    TAKE 1 TABLET BY MOUTH TWICE A DAY WITH A MEAL    METOPROLOL TARTRATE (LOPRESSOR) 100 MG TABLET    Take 1 tablet (100 mg total) by mouth 2 (two) times daily.    PEN NEEDLE, DIABETIC 31 GAUGE X 5/16" NDLE       Changed and/or Refilled Medications    Modified Medication Previous Medication    BLOOD SUGAR DIAGNOSTIC (BLOOD GLUCOSE TEST) STRP blood sugar diagnostic (BLOOD GLUCOSE TEST) Strp       TEST BLOOD GLUCOSE THREE TIMES DAILY    TEST BLOOD GLUCOSE THREE TIMES DAILY   Discontinued Medications    CLOPIDOGREL (PLAVIX) 75 MG TABLET    Take 1 tablet (75 mg total) by mouth once daily. for 21 days    SILDENAFIL (VIAGRA) 100 MG TABLET    Take 1 tablet (100 mg total) by mouth daily as needed for Erectile Dysfunction. Dispense generic      "

## 2020-02-20 NOTE — LETTER
February 20, 2020    Steven Shields  [address] [address]             [facility] - Family Medicine/ Internal Medicine  [address] [address]  Phone: [phone]  Fax: [phone] Dear Mr. Shields:    Below are the results from your recent visit:    Resulted Orders   PSA, Screening   Result Value Ref Range    PSA, SCREEN 2.7 0.00 - 4.00 ng/mL      Comment:      PSA Expected levels:  Hormonal Therapy: <0.05 ng/ml  Prostatectomy: <0.01 ng/ml  Radiation Therapy: <1.00 ng/ml     Urinalysis, Reflex to Urine Culture Urine, Clean Catch   Result Value Ref Range    Specimen UA Urine, Clean Catch     Color, UA Josie Yellow, Straw, Josie    Appearance, UA Cloudy (A) Clear    pH, UA 5.0 5.0 - 8.0    Specific Gravity, UA 1.020 1.005 - 1.030    Protein, UA 2+ (A) Negative      Comment:      Recommend a 24 hour urine protein or a urine   protein/creatinine ratio if globulin induced proteinuria is  clinically suspected.      Glucose, UA Negative Negative    Ketones, UA Negative Negative    Bilirubin (UA) Negative Negative    Occult Blood UA Negative Negative    Nitrite, UA Negative Negative    Leukocytes, UA Negative Negative    Narrative    Preferred Collection Type->Urine, Clean Catch   Hemoglobin A1c   Result Value Ref Range    Hemoglobin A1C 5.6 4.0 - 5.6 %      Comment:      ADA Screening Guidelines:  5.7-6.4%  Consistent with prediabetes  >or=6.5%  Consistent with diabetes  High levels of fetal hemoglobin interfere with the HbA1C  assay. Heterozygous hemoglobin variants (HbS, HgC, etc)do  not significantly interfere with this assay.   However, presence of multiple variants may affect accuracy.      Estimated Avg Glucose 114 68 - 131 mg/dL   Comprehensive metabolic panel   Result Value Ref Range    Sodium 142 136 - 145 mmol/L    Potassium 3.3 (L) 3.5 - 5.1 mmol/L    Chloride 105 95 - 110 mmol/L    CO2 29 23 - 29 mmol/L    Glucose 86 70 - 110 mg/dL    BUN, Bld 16 8 - 23 mg/dL    Creatinine  1.1 0.5 - 1.4 mg/dL    Calcium 9.1 8.7 - 10.5 mg/dL    Total Protein 7.6 6.0 - 8.4 g/dL    Albumin 3.7 3.5 - 5.2 g/dL    Total Bilirubin 0.3 0.1 - 1.0 mg/dL      Comment:      For infants and newborns, interpretation of results should be based  on gestational age, weight and in agreement with clinical  observations.  Premature Infant recommended reference ranges:  Up to 24 hours.............<8.0 mg/dL  Up to 48 hours............<12.0 mg/dL  3-5 days..................<15.0 mg/dL  6-29 days.................<15.0 mg/dL      Alkaline Phosphatase 84 55 - 135 U/L    AST 20 10 - 40 U/L    ALT 14 10 - 44 U/L    Anion Gap 8 8 - 16 mmol/L    eGFR if African American >60.0 >60 mL/min/1.73 m^2    eGFR if non African American >60.0 >60 mL/min/1.73 m^2      Comment:      Calculation used to obtain the estimated glomerular filtration  rate (eGFR) is the CKD-EPI equation.      Lipid panel   Result Value Ref Range    Cholesterol 218 (H) 120 - 199 mg/dL      Comment:      The National Cholesterol Education Program (NCEP) has set the  following guidelines (reference ranges) for Cholesterol:  Optimal.....................<200 mg/dL  Borderline High.............200-239 mg/dL  High........................> or = 240 mg/dL      Triglycerides 193 (H) 30 - 150 mg/dL      Comment:      The National Cholesterol Education Program (NCEP) has set the  following guidelines (reference values) for triglycerides:  Normal......................<150 mg/dL  Borderline High.............150-199 mg/dL  High........................200-499 mg/dL      HDL 41 40 - 75 mg/dL      Comment:      The National Cholesterol Education Program (NCEP) has set the  following guidelines (reference values) for HDL Cholesterol:  Low...............<40 mg/dL  Optimal...........>60 mg/dL      LDL Cholesterol 138.4 63.0 - 159.0 mg/dL      Comment:      The National Cholesterol Education Program (NCEP) has set the  following guidelines (reference values) for LDL  Cholesterol:  Optimal.......................<130 mg/dL  Borderline High...............130-159 mg/dL  High..........................160-189 mg/dL  Very High.....................>190 mg/dL      Hdl/Cholesterol Ratio 18.8 (L) 20.0 - 50.0 %    Total Cholesterol/HDL Ratio 5.3 (H) 2.0 - 5.0    Non-HDL Cholesterol 177 mg/dL      Comment:      Risk category and Non-HDL cholesterol goals:  Coronary heart disease (CHD)or equivalent (10-year risk of CHD >20%):  Non-HDL cholesterol goal     <130 mg/dL  Two or more CHD risk factors and 10-year risk of CHD <= 20%:  Non-HDL cholesterol goal     <160 mg/dL  0 to 1 CHD risk factor:  Non-HDL cholesterol goal     <190 mg/dL     Urinalysis Microscopic   Result Value Ref Range    RBC, UA 2 0 - 4 /hpf    WBC, UA 10 (H) 0 - 5 /hpf    Bacteria Many (A) None-Occ /hpf    Squam Epithel, UA 1 /hpf    Hyaline Casts, UA 5 (A) 0-1/lpf /lpf    Microscopic Comment SEE COMMENT       Comment:      Other formed elements not mentioned in the report are not   present in the microscopic examination.       Narrative    Preferred Collection Type->Urine, Clean Catch       Your labs have been reviewed by Dr. James Akers. Your blood sugar and cholesterol are well-controlled, so we can continue his current regimen.  Your kidney and liver function are normal, and his prostate level is normal as well.  Your urine sample is showing sign of infection however, so let's start you on a course of antibiotic and see if this helps him to urinate less often.  New Rx sent to [facility].  If you are not feeling any better next week, you can call the office for additional testing.  Thank you.  Sincerely,        Trung Aguillon MA

## 2020-03-06 ENCOUNTER — PATIENT OUTREACH (OUTPATIENT)
Dept: ADMINISTRATIVE | Facility: HOSPITAL | Age: 60
End: 2020-03-06

## 2020-03-08 ENCOUNTER — PATIENT OUTREACH (OUTPATIENT)
Dept: ADMINISTRATIVE | Facility: OTHER | Age: 60
End: 2020-03-08

## 2020-03-09 RX ORDER — FLUTICASONE PROPIONATE 50 MCG
SPRAY, SUSPENSION (ML) NASAL
COMMUNITY
Start: 2020-02-28

## 2020-03-09 NOTE — PROGRESS NOTES
Chart reviewed.   Immunizations: updated  Orders placed: n/a  Upcoming appts to satisfy SARAH topics: Optometry 3/09

## 2020-03-21 ENCOUNTER — NURSE TRIAGE (OUTPATIENT)
Dept: ADMINISTRATIVE | Facility: CLINIC | Age: 60
End: 2020-03-21

## 2020-05-06 ENCOUNTER — LAB VISIT (OUTPATIENT)
Dept: PRIMARY CARE CLINIC | Facility: CLINIC | Age: 60
End: 2020-05-06
Payer: COMMERCIAL

## 2020-05-06 DIAGNOSIS — R05.9 COUGH: Primary | ICD-10-CM

## 2020-05-06 PROCEDURE — U0002 COVID-19 LAB TEST NON-CDC: HCPCS

## 2020-05-07 LAB — SARS-COV-2 RNA RESP QL NAA+PROBE: NOT DETECTED

## 2020-05-27 ENCOUNTER — OFFICE VISIT (OUTPATIENT)
Dept: INTERNAL MEDICINE | Facility: CLINIC | Age: 60
End: 2020-05-27
Payer: COMMERCIAL

## 2020-05-27 VITALS
BODY MASS INDEX: 34.53 KG/M2 | OXYGEN SATURATION: 98 % | DIASTOLIC BLOOD PRESSURE: 102 MMHG | HEART RATE: 76 BPM | SYSTOLIC BLOOD PRESSURE: 180 MMHG | TEMPERATURE: 98 F | HEIGHT: 69 IN | WEIGHT: 233.13 LBS

## 2020-05-27 DIAGNOSIS — E78.5 HYPERLIPIDEMIA, UNSPECIFIED HYPERLIPIDEMIA TYPE: ICD-10-CM

## 2020-05-27 DIAGNOSIS — E11.42 TYPE 2 DIABETES MELLITUS WITH DIABETIC POLYNEUROPATHY, WITH LONG-TERM CURRENT USE OF INSULIN: Primary | ICD-10-CM

## 2020-05-27 DIAGNOSIS — I10 ESSENTIAL HYPERTENSION: ICD-10-CM

## 2020-05-27 DIAGNOSIS — Z79.4 TYPE 2 DIABETES MELLITUS WITH DIABETIC POLYNEUROPATHY, WITH LONG-TERM CURRENT USE OF INSULIN: Primary | ICD-10-CM

## 2020-05-27 PROCEDURE — 3080F PR MOST RECENT DIASTOLIC BLOOD PRESSURE >= 90 MM HG: ICD-10-PCS | Mod: CPTII,S$GLB,, | Performed by: FAMILY MEDICINE

## 2020-05-27 PROCEDURE — 3077F PR MOST RECENT SYSTOLIC BLOOD PRESSURE >= 140 MM HG: ICD-10-PCS | Mod: CPTII,S$GLB,, | Performed by: FAMILY MEDICINE

## 2020-05-27 PROCEDURE — 3044F PR MOST RECENT HEMOGLOBIN A1C LEVEL <7.0%: ICD-10-PCS | Mod: CPTII,S$GLB,, | Performed by: FAMILY MEDICINE

## 2020-05-27 PROCEDURE — 99999 PR PBB SHADOW E&M-EST. PATIENT-LVL IV: ICD-10-PCS | Mod: PBBFAC,,, | Performed by: FAMILY MEDICINE

## 2020-05-27 PROCEDURE — 3008F BODY MASS INDEX DOCD: CPT | Mod: CPTII,S$GLB,, | Performed by: FAMILY MEDICINE

## 2020-05-27 PROCEDURE — 3077F SYST BP >= 140 MM HG: CPT | Mod: CPTII,S$GLB,, | Performed by: FAMILY MEDICINE

## 2020-05-27 PROCEDURE — 3008F PR BODY MASS INDEX (BMI) DOCUMENTED: ICD-10-PCS | Mod: CPTII,S$GLB,, | Performed by: FAMILY MEDICINE

## 2020-05-27 PROCEDURE — 3044F HG A1C LEVEL LT 7.0%: CPT | Mod: CPTII,S$GLB,, | Performed by: FAMILY MEDICINE

## 2020-05-27 PROCEDURE — 99212 PR OFFICE/OUTPT VISIT, EST, LEVL II, 10-19 MIN: ICD-10-PCS | Mod: S$GLB,,, | Performed by: FAMILY MEDICINE

## 2020-05-27 PROCEDURE — 99212 OFFICE O/P EST SF 10 MIN: CPT | Mod: S$GLB,,, | Performed by: FAMILY MEDICINE

## 2020-05-27 PROCEDURE — 99999 PR PBB SHADOW E&M-EST. PATIENT-LVL IV: CPT | Mod: PBBFAC,,, | Performed by: FAMILY MEDICINE

## 2020-05-27 PROCEDURE — 3080F DIAST BP >= 90 MM HG: CPT | Mod: CPTII,S$GLB,, | Performed by: FAMILY MEDICINE

## 2020-05-27 RX ORDER — LISINOPRIL 40 MG/1
40 TABLET ORAL DAILY
Qty: 90 TABLET | Refills: 1 | Status: SHIPPED | OUTPATIENT
Start: 2020-05-27 | End: 2021-09-04 | Stop reason: SDUPTHER

## 2020-05-27 RX ORDER — FELODIPINE 10 MG/1
10 TABLET, EXTENDED RELEASE ORAL DAILY
Qty: 90 TABLET | Refills: 1 | Status: SHIPPED | OUTPATIENT
Start: 2020-05-27 | End: 2021-09-04 | Stop reason: SDUPTHER

## 2020-05-27 RX ORDER — METOPROLOL TARTRATE 100 MG/1
100 TABLET ORAL 2 TIMES DAILY
Qty: 180 TABLET | Refills: 1 | Status: SHIPPED | OUTPATIENT
Start: 2020-05-27 | End: 2021-11-15 | Stop reason: SDUPTHER

## 2020-05-27 RX ORDER — METFORMIN HYDROCHLORIDE 500 MG/1
TABLET ORAL
Qty: 180 TABLET | Refills: 1 | Status: SHIPPED | OUTPATIENT
Start: 2020-05-27 | End: 2021-11-15

## 2020-05-27 RX ORDER — ROSUVASTATIN CALCIUM 20 MG/1
20 TABLET, COATED ORAL DAILY
Qty: 90 TABLET | Refills: 3 | Status: SHIPPED | OUTPATIENT
Start: 2020-05-27 | End: 2021-11-15 | Stop reason: SDUPTHER

## 2020-05-27 NOTE — PROGRESS NOTES
[facility]  [facility] Family Medicine/ Internal Medicine  Clinic Note      Subjective:       Patient ID: Steven Shields is a 60 y.o. male.    Chief Complaint: medication follow up    Patient returns today for follow-up visit.  He was last seen to [facility] and notes his blood pressure has been out of control recently because he ran out of medications.  He has chronic diabetes, HTN, and HLD.  He does not check his blood sugars at home.  No recent illness or fever.  He has history of prior CVA, and notes no chest pain, headache, or recurrent neurologic deficit.      Diabetes   He presents for his follow-up diabetic visit. He has type 2 diabetes mellitus. His disease course has been stable. There are no hypoglycemic associated symptoms. Pertinent negatives for hypoglycemia include no dizziness or headaches. Associated symptoms include polyuria. Pertinent negatives for diabetes include no chest pain, no fatigue, no foot paresthesias, no foot ulcerations, no polydipsia and no visual change. There are no hypoglycemic complications. Symptoms are stable. Diabetic complications include a CVA. Pertinent negatives for diabetic complications include no heart disease. Risk factors for coronary artery disease include dyslipidemia, diabetes mellitus, hypertension, male sex and sedentary lifestyle. Current diabetic treatment includes insulin injections and oral agent (monotherapy). He is compliant with treatment all of the time. He is following a generally unhealthy diet. When asked about meal planning, he reported none. He participates in exercise intermittently. An ACE inhibitor/angiotensin II receptor blocker is being taken. He sees a podiatrist.Eye exam is current.   Hypertension   This is a chronic problem. The problem has been rapidly worsening since onset. The problem is uncontrolled. Pertinent negatives include no anxiety, chest pain, headaches, malaise/fatigue, palpitations, peripheral edema or  "shortness of breath. Past treatments include calcium channel blockers, ACE inhibitors and beta blockers. The current treatment provides moderate improvement. Compliance problems include psychosocial issues.  Hypertensive end-organ damage includes CVA. There is no history of kidney disease or CAD/MI.     Review of Systems   Constitutional: Negative for fatigue, fever and malaise/fatigue.   HENT: Negative for congestion, rhinorrhea and sore throat.    Respiratory: Negative for cough and shortness of breath.    Cardiovascular: Negative for chest pain and palpitations.   Gastrointestinal: Negative for abdominal pain, diarrhea, nausea and vomiting.   Endocrine: Positive for polyuria. Negative for polydipsia.   Neurological: Negative for dizziness, syncope and headaches.       Objective:      BP (!) 180/102 (BP Location: Right arm, Patient Position: Sitting, BP Method: Large (Manual))   Pulse 76   Temp 98.4 °F (36.9 °C)   Ht 5' 9" (1.753 m)   Wt 105.7 kg (233 lb 2.2 oz)   SpO2 98%   BMI 34.43 kg/m²   Physical Exam   Constitutional: He is oriented to person, place, and time. He appears well-developed and well-nourished. No distress.   Neck: Normal range of motion.   Cardiovascular: Normal rate and regular rhythm.   No murmur heard.  Pulmonary/Chest: Effort normal and breath sounds normal. He has no wheezes. He has no rales.   Abdominal: Soft. Bowel sounds are normal. He exhibits no distension and no mass. There is no tenderness. There is no guarding.   Musculoskeletal: Normal range of motion.   Neurological: He is alert and oriented to person, place, and time.   Skin: Skin is warm and dry. No rash noted.   Psychiatric: He has a normal mood and affect.   Vitals reviewed.      Assessment:       1. Type 2 diabetes mellitus with diabetic polyneuropathy, with long-term current use of insulin    2. Essential hypertension    3. Hyperlipidemia, unspecified hyperlipidemia type        Plan:     1. Essential hypertension  - " lisinopriL (PRINIVIL,ZESTRIL) 40 MG tablet; Take 1 tablet (40 mg total) by mouth once daily.  Dispense: 90 tablet; Refill: 1  - metoprolol tartrate (LOPRESSOR) 100 MG tablet; Take 1 tablet (100 mg total) by mouth 2 (two) times daily.  Dispense: 180 tablet; Refill: 1  - felodipine (PLENDIL) 10 MG 24 hr tablet; Take 1 tablet (10 mg total) by mouth once daily.  Dispense: 90 tablet; Refill: 1  - Comprehensive metabolic panel; Future    2. Type 2 diabetes mellitus with diabetic polyneuropathy, with long-term current use of insulin  - metFORMIN (GLUCOPHAGE) 500 MG tablet; TAKE 1 TABLET BY MOUTH TWICE A DAY WITH A MEAL  Dispense: 180 tablet; Refill: 1  - Hemoglobin A1C; Future    3. Hyperlipidemia, unspecified hyperlipidemia type  - rosuvastatin (CRESTOR) 20 MG tablet; Take 1 tablet (20 mg total) by mouth once daily.  Dispense: 90 tablet; Refill: 3  - Lipid Panel; Future    - Follow up in about 2 weeks (around 6/10/2020) for nurse visit for BP check.     James Akers MD  5/27/2020          Medication List with Changes/Refills   New Medications    ROSUVASTATIN (CRESTOR) 20 MG TABLET    Take 1 tablet (20 mg total) by mouth once daily.   Current Medications    ASPIRIN (ECOTRIN) 81 MG EC TABLET    Take 1 tablet (81 mg total) by mouth once daily.    BASAGLAR KWIKPEN U-100 INSULIN GLARGINE 100 UNITS/ML (3ML) SUBQ PEN    INJECT 16 UNITS INTO THE SKIN EVERY EVENING.    FLUTICASONE PROPIONATE (FLONASE) 50 MCG/ACTUATION NASAL SPRAY       Changed and/or Refilled Medications    Modified Medication Previous Medication    FELODIPINE (PLENDIL) 10 MG 24 HR TABLET felodipine (PLENDIL) 10 MG 24 hr tablet       Take 1 tablet (10 mg total) by mouth once daily.    Take 1 tablet (10 mg total) by mouth once daily.    LISINOPRIL (PRINIVIL,ZESTRIL) 40 MG TABLET lisinopril (PRINIVIL,ZESTRIL) 40 MG tablet       Take 1 tablet (40 mg total) by mouth once daily.    Take 1 tablet (40 mg total) by mouth once daily.    METFORMIN (GLUCOPHAGE) 500 MG  "TABLET metFORMIN (GLUCOPHAGE) 500 MG tablet       TAKE 1 TABLET BY MOUTH TWICE A DAY WITH A MEAL    TAKE 1 TABLET BY MOUTH TWICE A DAY WITH A MEAL    METOPROLOL TARTRATE (LOPRESSOR) 100 MG TABLET metoprolol tartrate (LOPRESSOR) 100 MG tablet       Take 1 tablet (100 mg total) by mouth 2 (two) times daily.    Take 1 tablet (100 mg total) by mouth 2 (two) times daily.   Discontinued Medications    ATORVASTATIN (LIPITOR) 40 MG TABLET    Take 1 tablet (40 mg total) by mouth nightly.    BLOOD SUGAR DIAGNOSTIC (BLOOD GLUCOSE TEST) [facility]    TEST BLOOD GLUCOSE THREE TIMES DAILY    BLOOD-GLUCOSE METER (TRUE METRIX GLUCOSE METER) MISC    DISPENSE ONE BLOOD GLUCOSE METER    BLOOD-GLUCOSE METER MISC    Test daily as directed    LANCETS (TRUEPLUS LANCETS) 33 GAUGE [facility]    TEST BLOOD GLUCOSE THREE TIMES DAILY    LANCETS MISC    Use to test blood gulcose 3 times a day    ESTRELLA MODI, DIABETIC 31 GAUGE X 5/16" NDLE          "

## 2020-05-27 NOTE — PATIENT INSTRUCTIONS
Controlling High Blood Pressure  High blood pressure (hypertension) is often called the silent killer. This is because many people who have it dont know it. High blood pressure is defined as 140/90 mm Hg or higher. Know your blood pressure and remember to check it regularly. Doing so can save your life. Here are some things you can do to help control your blood pressure.    Choose heart-healthy foods  · Select low-salt, low-fat foods. Limit sodium intake to 2,400 mg per day or the amount suggested by your healthcare provider.  · Limit canned, dried, cured, packaged, and fast foods. These can contain a lot of salt.  · Eat 8 to 10 servings of fruits and vegetables every day.  · Choose lean meats, fish, or chicken.  · Eat whole-grain pasta, brown rice, and beans.  · Eat 2 to 3 servings of low-fat or fat-free dairy products.  · Ask your doctor about the DASH eating plan. This plan helps reduce blood pressure.  · When you go to a restaurant, ask that your meal be prepared with no added salt.  Maintain a healthy weight  · Ask your healthcare provider how many calories to eat a day. Then stick to that number.  · Ask your healthcare provider what weight range is healthiest for you. If you are overweight, a weight loss of only 3% to 5% of your body weight can help lower blood pressure. Generally, a good weight loss goal is to lose 10% of your body weight in a year.  · Limit snacks and sweets.  · Get regular exercise.  Get up and get active  · Choose activities you enjoy. Find ones you can do with friends or family. This includes bicycling, dancing, walking, and jogging.  · Park farther away from building entrances.  · Use stairs instead of the elevator.  · When you can, walk or bike instead of driving.  · [place] leaves, garden, or do household repairs.  · Be active at a moderate to vigorous level of physical activity for at least 40 minutes for a minimum of 3 to 4 days a week.   Manage stress  · Make time to relax and enjoy  life. Find time to laugh.  · Communicate your concerns with your loved ones and your healthcare provider.  · Visit with family and friends, and keep up with hobbies.  Limit alcohol and quit smoking  · Men should have no more than 2 drinks per day.  · Women should have no more than 1 drink per day.  · Talk with your healthcare provider about quitting smoking. Smoking significantly increases your risk for heart disease and stroke. Ask your healthcare provider about community smoking cessation programs and other options.  Medicines  If lifestyle changes arent enough, your healthcare provider may prescribe high blood pressure medicine. Take all medicines as prescribed. If you have any questions about your medicines, ask your healthcare provider before stopping or changing them.   Date Last Reviewed: 4/27/2016  © 8261-5760 [employer] [place]. [address] [place] [address], [place] [address]. All rights reserved. This information is not intended as a substitute for professional medical care. Always follow your healthcare professional's instructions.

## 2020-05-29 ENCOUNTER — CLINICAL SUPPORT (OUTPATIENT)
Dept: INTERNAL MEDICINE | Facility: CLINIC | Age: 60
End: 2020-05-29
Payer: COMMERCIAL

## 2020-05-29 DIAGNOSIS — I10 ESSENTIAL HYPERTENSION: ICD-10-CM

## 2020-05-29 DIAGNOSIS — E11.42 TYPE 2 DIABETES MELLITUS WITH DIABETIC POLYNEUROPATHY, WITH LONG-TERM CURRENT USE OF INSULIN: ICD-10-CM

## 2020-05-29 DIAGNOSIS — Z79.4 TYPE 2 DIABETES MELLITUS WITH DIABETIC POLYNEUROPATHY, WITH LONG-TERM CURRENT USE OF INSULIN: ICD-10-CM

## 2020-05-29 DIAGNOSIS — E78.5 HYPERLIPIDEMIA, UNSPECIFIED HYPERLIPIDEMIA TYPE: ICD-10-CM

## 2020-05-29 LAB
ALBUMIN SERPL BCP-MCNC: 3.5 G/DL (ref 3.5–5.2)
ALP SERPL-CCNC: 77 U/L (ref 55–135)
ALT SERPL W/O P-5'-P-CCNC: 23 U/L (ref 10–44)
ANION GAP SERPL CALC-SCNC: 6 MMOL/L (ref 8–16)
AST SERPL-CCNC: 28 U/L (ref 10–40)
BILIRUB SERPL-MCNC: 0.5 MG/DL (ref 0.1–1)
BUN SERPL-MCNC: 24 MG/DL (ref 8–23)
CALCIUM SERPL-MCNC: 8.7 MG/DL (ref 8.7–10.5)
CHLORIDE SERPL-SCNC: 109 MMOL/L (ref 95–110)
CHOLEST SERPL-MCNC: 179 MG/DL (ref 120–199)
CHOLEST/HDLC SERPL: 5 {RATIO} (ref 2–5)
CO2 SERPL-SCNC: 27 MMOL/L (ref 23–29)
CREAT SERPL-MCNC: 1.3 MG/DL (ref 0.5–1.4)
EST. GFR  (AFRICAN AMERICAN): >60 ML/MIN/1.73 M^2
EST. GFR  (NON AFRICAN AMERICAN): 58.5 ML/MIN/1.73 M^2
ESTIMATED AVG GLUCOSE: 123 MG/DL (ref 68–131)
GLUCOSE SERPL-MCNC: 108 MG/DL (ref 70–110)
HBA1C MFR BLD HPLC: 5.9 % (ref 4–5.6)
HDLC SERPL-MCNC: 36 MG/DL (ref 40–75)
HDLC SERPL: 20.1 % (ref 20–50)
LDLC SERPL CALC-MCNC: 125.8 MG/DL (ref 63–159)
NONHDLC SERPL-MCNC: 143 MG/DL
POTASSIUM SERPL-SCNC: 3.5 MMOL/L (ref 3.5–5.1)
PROT SERPL-MCNC: 7.1 G/DL (ref 6–8.4)
SODIUM SERPL-SCNC: 142 MMOL/L (ref 136–145)
TRIGL SERPL-MCNC: 86 MG/DL (ref 30–150)

## 2020-05-29 PROCEDURE — 80061 LIPID PANEL: CPT

## 2020-05-29 PROCEDURE — 83036 HEMOGLOBIN GLYCOSYLATED A1C: CPT

## 2020-05-29 PROCEDURE — 80053 COMPREHEN METABOLIC PANEL: CPT

## 2020-06-01 ENCOUNTER — TELEPHONE (OUTPATIENT)
Dept: INTERNAL MEDICINE | Facility: CLINIC | Age: 60
End: 2020-06-01

## 2020-06-01 NOTE — TELEPHONE ENCOUNTER
----- Message from Eliane Marsh sent at 6/1/2020 10:54 AM CDT -----  Contact: pt  Type:  Patient Returning Call    Who Called: JONNY TODD [[id number]]    Who Left Message for Patient: Trung Aguillon MA      Does the patient know what this is regarding?: Test results     Best Call Back Number:[phone]    Additional Information: no

## 2020-06-01 NOTE — TELEPHONE ENCOUNTER
----- Message from Amalia Ceja sent at 6/1/2020  1:03 PM CDT -----  Contact: JONNY TODD [[id number]]  Type:  Patient Returning Call    Who Called: JONNY TODD [[id number]]    Who Left Message for Patient: Trung    Does the patient know what this is regarding?: yes    Best Call Back Number: [phone]    Additional Information:

## 2020-06-01 NOTE — TELEPHONE ENCOUNTER
"----- Message from Ashley Kc sent at 6/1/2020  1:12 PM CDT -----  Contact: Debbie Marsh / Ph# [phone]  Name of Who is Calling: Debbie Marsh / Ph# [phone]      What is the request in detail:   Patient called stating, "he's returning your call and would like you to please call again."    Thanks!      Reply by [facility]: no    Call Back: Debbie Marsh / Ph# [phone]                                    "

## 2020-06-01 NOTE — TELEPHONE ENCOUNTER
Please let the patient know his labs have been reviewed.  His kidney and liver function are normal.  His blood sugars over the last 3 months have been well-controlled, so he can continue his current diabetes medication regimen.  His cholesterol levels are looking better, and he will definitely need to restart taking the Crestor medication.  Thank you.

## 2020-06-04 ENCOUNTER — HOSPITAL ENCOUNTER (EMERGENCY)
Facility: HOSPITAL | Age: 60
Discharge: HOME | End: 2020-06-04
Attending: EMERGENCY MEDICINE
Payer: COMMERCIAL

## 2020-06-04 VITALS
HEART RATE: 80 BPM | OXYGEN SATURATION: 98 % | DIASTOLIC BLOOD PRESSURE: 90 MMHG | BODY MASS INDEX: 33.77 KG/M2 | SYSTOLIC BLOOD PRESSURE: 185 MMHG | TEMPERATURE: 99 F | WEIGHT: 228 LBS | HEIGHT: 69 IN | RESPIRATION RATE: 16 BRPM

## 2020-06-04 DIAGNOSIS — R45.4 IRRITABILITY AND ANGER: Primary | ICD-10-CM

## 2020-06-04 PROCEDURE — 99282 PR EMERGENCY DEPT VISIT,LEVEL II: ICD-10-PCS | Mod: ,,, | Performed by: EMERGENCY MEDICINE

## 2020-06-04 PROCEDURE — 99283 EMERGENCY DEPT VISIT LOW MDM: CPT

## 2020-06-04 PROCEDURE — 99282 EMERGENCY DEPT VISIT SF MDM: CPT | Mod: ,,, | Performed by: EMERGENCY MEDICINE

## 2020-06-04 RX ORDER — HYDROXYZINE PAMOATE 25 MG/1
25 CAPSULE ORAL EVERY 6 HOURS PRN
Qty: 20 CAPSULE | Refills: 0 | OUTPATIENT
Start: 2020-06-04 | End: 2021-09-06

## 2020-06-04 NOTE — ED NOTES
"Patient identifiers verified and correct for Steven Shields 60 y.o. male  Chief Complaint   Patient presents with    Mood Swings     Pt reports feeling "up and down".  Pt denies SI/HI.  Pt states that he has "anger issues".  Wife reports pt has "been like this as long as she has been with him".  Pt denies any psych history.     Past Medical History:   Diagnosis Date    Diabetes mellitus type II     Hyperlipidemia     Hypertension     Stroke     Nov 2011, denies deficits      Allergies reported   Review of patient's allergies indicates:   Allergen Reactions    Pneumococcal 23-timothy ps vaccine        LOC: The patient is awake, alert and aware of environment with an appropriate affect, the patient is oriented x 3 and speaking appropriately.   APPEARANCE: Patient appears comfortable and in no acute distress, patient is clean and well groomed.  SKIN: The skin is warm and dry, color consistent with ethnicity, patient has normal skin turgor and moist mucus membranes, skin intact, no breakdown or bruising noted.   MUSCULOSKELETAL: Patient moving all extremities spontaneously, no swelling noted.  RESPIRATORY: Airway is open and patent, respirations are spontaneous, patient has a normal effort and rate, no accessory muscle use noted, pt placed on continuous pulse ox with O2 sats noted at 97% on room air.  CARDIAC: Pt placed on cardiac monitor. Patient has a normal rate and regular rhythm, no edema noted, capillary refill < 3 seconds.   GASTRO: Soft and non tender to palpation, no distention noted, normoactive bowel sounds present in all four quadrants. Pt states bowel movements have been regular.  : Pt denies any pain or frequency with urination.  NEURO: Pt opens eyes spontaneously, behavior appropriate to situation, follows commands, facial expression symmetrical, bilateral hand grasp equal and even, purposeful motor response noted, normal sensation in all extremities when touched with a finger.    + Mood Swings (Pt " "reports feeling "up and down". Pt denies SI/HI. Pt states that he has "anger issues". Wife reports pt has "been like this as long as she has been with him". Pt denies any psych history.) Pt states that he gets "real angry with his wife" when talking with her and needs meds. Denies SI/HI.   "

## 2020-06-04 NOTE — ED TRIAGE NOTES
"+ Mood Swings (Pt reports feeling "up and down". Pt denies SI/HI. Pt states that he has "anger issues". Wife reports pt has "been like this as long as she has been with him". Pt denies any psych history.) Pt states that he gets "real angry with his wife" when talking with her and needs meds. Denies SI/HI.   "
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
## 2020-06-04 NOTE — ED NOTES
"ED  (SW) entered pt's room with his permission. Pt confirmed identifiers. Pt reports that he would like someone he could talk to "because I want my marriage to work." Pt reports that he has been having a hard time communicating his anger.       SW provided reflective listening skills. Pt reports that he would like for his therapist to be spiritual and location did not matter.     Pt was able to identify and communicate his needs. Pt spoke in complete sentences. Pt denies SI/HI. Pt states that he has an appointment with his PCP and will talk to him about medication to help him remain calm. He states that "I want to work with them both because I want to be accountable for myself.     SW provided a list of therapist who are currently accepting new pts, conducting in person or phone sessions due to covid and accept his health insurance in his [place] paperwork. SW instructed pt to refer to his PCP for more recommendations if the options aren't sufficient.      - Ewelina Murillo, [facility]   [phone]     "

## 2020-06-08 ENCOUNTER — OFFICE VISIT (OUTPATIENT)
Dept: INTERNAL MEDICINE | Facility: CLINIC | Age: 60
End: 2020-06-08
Payer: COMMERCIAL

## 2020-06-08 VITALS
OXYGEN SATURATION: 95 % | WEIGHT: 237.75 LBS | DIASTOLIC BLOOD PRESSURE: 70 MMHG | BODY MASS INDEX: 35.21 KG/M2 | SYSTOLIC BLOOD PRESSURE: 126 MMHG | TEMPERATURE: 98 F | HEIGHT: 69 IN | HEART RATE: 75 BPM

## 2020-06-08 DIAGNOSIS — E11.42 TYPE 2 DIABETES MELLITUS WITH DIABETIC POLYNEUROPATHY, WITH LONG-TERM CURRENT USE OF INSULIN: ICD-10-CM

## 2020-06-08 DIAGNOSIS — M79.2 PERIPHERAL NEUROPATHIC PAIN: ICD-10-CM

## 2020-06-08 DIAGNOSIS — Z79.4 TYPE 2 DIABETES MELLITUS WITH DIABETIC POLYNEUROPATHY, WITH LONG-TERM CURRENT USE OF INSULIN: ICD-10-CM

## 2020-06-08 DIAGNOSIS — I10 ESSENTIAL HYPERTENSION: Primary | ICD-10-CM

## 2020-06-08 PROCEDURE — 3074F SYST BP LT 130 MM HG: CPT | Mod: CPTII,S$GLB,, | Performed by: FAMILY MEDICINE

## 2020-06-08 PROCEDURE — 99213 PR OFFICE/OUTPT VISIT, EST, LEVL III, 20-29 MIN: ICD-10-PCS | Mod: S$GLB,,, | Performed by: FAMILY MEDICINE

## 2020-06-08 PROCEDURE — 3008F PR BODY MASS INDEX (BMI) DOCUMENTED: ICD-10-PCS | Mod: CPTII,S$GLB,, | Performed by: FAMILY MEDICINE

## 2020-06-08 PROCEDURE — 3044F HG A1C LEVEL LT 7.0%: CPT | Mod: CPTII,S$GLB,, | Performed by: FAMILY MEDICINE

## 2020-06-08 PROCEDURE — 3078F DIAST BP <80 MM HG: CPT | Mod: CPTII,S$GLB,, | Performed by: FAMILY MEDICINE

## 2020-06-08 PROCEDURE — 3074F PR MOST RECENT SYSTOLIC BLOOD PRESSURE < 130 MM HG: ICD-10-PCS | Mod: CPTII,S$GLB,, | Performed by: FAMILY MEDICINE

## 2020-06-08 PROCEDURE — 99999 PR PBB SHADOW E&M-EST. PATIENT-LVL IV: ICD-10-PCS | Mod: PBBFAC,,, | Performed by: FAMILY MEDICINE

## 2020-06-08 PROCEDURE — 3008F BODY MASS INDEX DOCD: CPT | Mod: CPTII,S$GLB,, | Performed by: FAMILY MEDICINE

## 2020-06-08 PROCEDURE — 99213 OFFICE O/P EST LOW 20 MIN: CPT | Mod: S$GLB,,, | Performed by: FAMILY MEDICINE

## 2020-06-08 PROCEDURE — 99999 PR PBB SHADOW E&M-EST. PATIENT-LVL IV: CPT | Mod: PBBFAC,,, | Performed by: FAMILY MEDICINE

## 2020-06-08 PROCEDURE — 3078F PR MOST RECENT DIASTOLIC BLOOD PRESSURE < 80 MM HG: ICD-10-PCS | Mod: CPTII,S$GLB,, | Performed by: FAMILY MEDICINE

## 2020-06-08 PROCEDURE — 3044F PR MOST RECENT HEMOGLOBIN A1C LEVEL <7.0%: ICD-10-PCS | Mod: CPTII,S$GLB,, | Performed by: FAMILY MEDICINE

## 2020-06-08 RX ORDER — HYDRALAZINE HYDROCHLORIDE 25 MG/1
25 TABLET, FILM COATED ORAL 3 TIMES DAILY PRN
Qty: 30 TABLET | Refills: 1 | OUTPATIENT
Start: 2020-06-08 | End: 2021-09-06

## 2020-06-08 RX ORDER — DEXTROSE 4 G
TABLET,CHEWABLE ORAL
Qty: 1 EACH | Refills: 0 | Status: SHIPPED | OUTPATIENT
Start: 2020-06-08

## 2020-06-08 RX ORDER — INSULIN GLARGINE 100 [IU]/ML
10 INJECTION, SOLUTION SUBCUTANEOUS NIGHTLY
Qty: 15 ML | Refills: 1 | OUTPATIENT
Start: 2020-06-08 | End: 2021-09-06

## 2020-06-08 NOTE — PROGRESS NOTES
[facility]  [facility] - Family Medicine/ Internal Medicine  Clinic Note      Subjective:       Patient ID: Steven Shields is a 60 y.o. male.    Chief Complaint: elevated blood pressure and dizziness    Patient returns today for ER follow-up visit.  He was seen recently for evaluation of disrupted mood and irritability, was given a trial of hydroxyzine.  He has taken this at night and notes some resulting drowsiness, but feels his mood has improved somewhat already.  He is awaiting evaluation with a psychologist later this week.  Otherwise recent labs reviewed today, and results showing good blood sugar control at 5.9%.  He continues on insulin but does not check his sugars at home.  He has had onset of dizziness in the last several days, feels is related to his BP but his home BP cuff today was very inaccurate with inappropriately high readings.  He has not had periods of hypoglycemia in the past.    Diabetes   He presents for his follow-up diabetic visit. He has type 2 diabetes mellitus. His disease course has been stable. Hypoglycemia symptoms include dizziness. Pertinent negatives for hypoglycemia include no headaches. Pertinent negatives for diabetes include no chest pain, no fatigue, no foot paresthesias, no foot ulcerations, no polydipsia, no polyuria and no visual change. There are no hypoglycemic complications. Symptoms are stable. Diabetic complications include a CVA. Pertinent negatives for diabetic complications include no heart disease. Risk factors for coronary artery disease include dyslipidemia, diabetes mellitus, hypertension, male sex and sedentary lifestyle. Current diabetic treatment includes insulin injections and oral agent (monotherapy). He is compliant with treatment all of the time. He is following a generally unhealthy diet. When asked about meal planning, he reported none. He participates in exercise intermittently. An ACE inhibitor/angiotensin II receptor blocker is being  "taken. He sees a podiatrist.Eye exam is current.   Hypertension   This is a chronic problem. The problem is unchanged. The problem is controlled. Pertinent negatives include no anxiety, chest pain, headaches, malaise/fatigue, palpitations, peripheral edema or shortness of breath. Past treatments include calcium channel blockers, ACE inhibitors and beta blockers. The current treatment provides moderate improvement. Compliance problems include psychosocial issues.  Hypertensive end-organ damage includes CVA. There is no history of kidney disease or CAD/MI.     Review of Systems   Constitutional: Negative for fatigue, fever and malaise/fatigue.   Respiratory: Negative for shortness of breath.    Cardiovascular: Negative for chest pain and palpitations.   Gastrointestinal: Negative for diarrhea, nausea and vomiting.   Endocrine: Negative for polydipsia and polyuria.   Neurological: Positive for dizziness. Negative for light-headedness and headaches.       Objective:      /70 (BP Location: Left arm, Patient Position: Sitting, BP Method: Large (Manual))   Pulse 75   Temp 97.7 °F (36.5 °C)   Ht 5' 9" (1.753 m)   Wt 107.9 kg (237 lb 12.3 oz)   SpO2 95%   BMI 35.11 kg/m²   Physical Exam   Constitutional: He is oriented to person, place, and time. He appears well-developed and well-nourished. No distress.   Neck: Normal range of motion.   Cardiovascular: Normal rate and regular rhythm.   No murmur heard.  Pulmonary/Chest: Effort normal and breath sounds normal. He has no wheezes. He has no rales.   Abdominal: Soft. Bowel sounds are normal. He exhibits no distension and no mass. There is no tenderness. There is no guarding.   Musculoskeletal: Normal range of motion.   Neurological: He is alert and oriented to person, place, and time.   Skin: Skin is warm and dry. No rash noted.   Psychiatric: He has a normal mood and affect.   Vitals reviewed.      Assessment:       1. Essential hypertension    2. Type 2 diabetes " mellitus with diabetic polyneuropathy, with long-term current use of insulin    3. Peripheral neuropathic pain        Plan:     1. Essential hypertension  - chronic condition, well-controlled today and it appears his home cuff is reading inaccurately high  - diet and exercise lifestyle modifications reviewed and recommended  - treatment options reviewed, will continue current regimen and add hydralazine if needed for elevated readings, but have advised to obtain a new BP cuff before doing so, dosing instructions and potential side effects reviewed, handout provided   - - hydrALAZINE (APRESOLINE) 25 MG tablet; Take 1 tablet (25 mg total) by mouth 3 (three) times daily as needed (if blood pressure is above 160/90).  Dispense: 30 tablet; Refill: 1  - goal BP reviewed, monitor clinical response, have recommended to resume checking BP at home and bring in home cuff for RN visit to verify accuracy  - questions answered, warning signs reviewed, patient is comfortable with this plan and was encouraged to call the office for any concerns or worsening of condition     2. Type 2 diabetes mellitus with diabetic polyneuropathy, with long-term current use of insulin  - onset of dizziness and recent lab result reviewed, showing A1c << goal for good control and raising suspicion for hypoglycemic spells  - will reduce dosing of insulin and have advised to start check sugars at home for review  - - insulin (BASAGLAR KWIKPEN U-100 INSULIN) glargine 100 units/mL (3mL) SubQ pen; Inject 10 Units into the skin every evening.  Dispense: 15 mL; Refill: 1  - - blood sugar diagnostic Strp; Use as directed to check blood sugars twice daily  Dispense: 100 each; Refill: 1  - - blood-glucose meter Misc; use as directed  Dispense: 1 each; Refill: 0    3. Peripheral neuropathic pain  - Ambulatory referral/consult to Neurology; Future\    - Follow up in 2-4 weeks, for follow-up dizziness, BP, sugars, mood.     James Akers MD  6/8/2020   "        Medication List with Changes/Refills   New Medications    BLOOD SUGAR DIAGNOSTIC STRP    Use as directed to check blood sugars twice daily    BLOOD-GLUCOSE METER MISC    use as directed    HYDRALAZINE (APRESOLINE) 25 MG TABLET    Take 1 tablet (25 mg total) by mouth 3 (three) times daily as needed (if blood pressure is above 160/90).    LANCETS MISC    use as directed to check blood sugar twice daily    PEN NEEDLE, DIABETIC (BD ULTRA-FINE ANDREW PEN NEEDLE) 32 GAUGE X 5/32" NDLE    use as directed to inject insulin daily   Current Medications    ASPIRIN (ECOTRIN) 81 MG EC TABLET    Take 1 tablet (81 mg total) by mouth once daily.    FELODIPINE (PLENDIL) 10 MG 24 HR TABLET    Take 1 tablet (10 mg total) by mouth once daily.    FLUTICASONE PROPIONATE (FLONASE) 50 MCG/ACTUATION NASAL SPRAY    as needed.     HYDROXYZINE PAMOATE (VISTARIL) 25 MG CAP    Take 1 capsule (25 mg total) by mouth every 6 (six) hours as needed.    LISINOPRIL (PRINIVIL,ZESTRIL) 40 MG TABLET    Take 1 tablet (40 mg total) by mouth once daily.    METFORMIN (GLUCOPHAGE) 500 MG TABLET    TAKE 1 TABLET BY MOUTH TWICE A DAY WITH A MEAL    METOPROLOL TARTRATE (LOPRESSOR) 100 MG TABLET    Take 1 tablet (100 mg total) by mouth 2 (two) times daily.    ROSUVASTATIN (CRESTOR) 20 MG TABLET    Take 1 tablet (20 mg total) by mouth once daily.   Changed and/or Refilled Medications    Modified Medication Previous Medication    INSULIN (BASAGLAR KWIKPEN U-100 INSULIN) GLARGINE 100 UNITS/ML (3ML) SUBQ PEN BASAGLAR KWIKPEN U-100 INSULIN glargine 100 units/mL (3mL) SubQ pen       Inject 10 Units into the skin every evening.    INJECT 16 UNITS INTO THE SKIN EVERY EVENING.      "

## 2020-06-08 NOTE — PATIENT INSTRUCTIONS
Dizziness (Uncertain Cause)  Dizziness is a common symptom. It may be described as lightheadedness, spinning, or feeling like you are going to faint. Dizziness can have many causes.  Be sure to tell the healthcare provider about:  · All medicines you take, including prescription, over-the-counter, herbs, and supplements  · Any other symptoms you have  · Any health problems you are being treated for  · Anything that causes the dizziness to get worse or better  Today's exam did not show an exact cause for your dizziness. Other tests may be needed. Follow up with your healthcare provider.  Home care  · Dizziness that occurs with sudden standing may be a sign of mild dehydration. Drink extra fluids for the next few days.  · If you recently started a new medicine, stopped a medicine, or had the dose of a current medicine changed, talk with the prescribing healthcare provider. Your medicine plan may need adjustment.  · If dizziness lasts more than a few seconds, sit or lie down until it passes. This may help prevent injury in case you pass out.  · Do not drive or use power tools or dangerous equipment until you have had no dizziness for at least 48 hours.  Follow-up care  Follow up with your healthcare provider for further evaluation within the next 7 days or as advised.  When to seek medical advice  Call your healthcare provider for any of the following:  · Worsening of symptoms or new symptoms  · Passing out or seizure  · Repeated vomiting  · Headache  · Palpitations (the sense that your heart is fluttering or beating fast or hard)  · Shortness of breath  · Blood in vomit or stool (black or red color)  · Weakness of an arm or leg or one side of the face  · Vision or hearing changes  · Trouble walking or speaking  · Chest, arm, neck, back, or jaw pain  Date Last Reviewed: 8/23/2015  © 2997-8096 [employer]. [address] [place] [address], [place] [address]. All rights reserved. This information is not intended as  a substitute for professional medical care. Always follow your healthcare professional's instructions.        Gabapentin capsules or tablets  What is this medicine?  GABAPENTIN (GA ba pen tin) is used to control partial seizures in adults with epilepsy. It is also used to treat certain types of nerve pain.  How should I use this medicine?  Take this medicine by mouth with a glass of water. Follow the directions on the prescription label. You can take it with or without food. If it upsets your stomach, take it with food.Take your medicine at regular intervals. Do not take it more often than directed. Do not stop taking except on your doctor's advice.  If you are directed to break the 600 or 800 mg tablets in half as part of your dose, the extra half tablet should be used for the next dose. If you have not used the extra half tablet within 28 days, it should be thrown away.  A special MedGuide will be given to you by the pharmacist with each prescription and refill. Be sure to read this information carefully each time.  Talk to your pediatrician regarding the use of this medicine in children. Special care may be needed.  What side effects may I notice from receiving this medicine?  Side effects that you should report to your doctor or health care professional as soon as possible:  allergic reactions like skin rash, itching or hives, swelling of the face, lips, or tongue  worsening of mood, thoughts or actions of suicide or dying  Side effects that usually do not require medical attention (report to your doctor or health care professional if they continue or are bothersome):  constipation  difficulty walking or controlling muscle movements  dizziness  nausea  slurred speech  tiredness  tremors  weight gain  What may interact with this medicine?  Do not take this medicine with any of the following medications:  other gabapentin products  This medicine may also interact with the following  medications:  alcohol  antacids  antihistamines for allergy, cough and cold  certain medicines for anxiety or sleep  certain medicines for depression or psychotic disturbances  homatropine; hydrocodone  naproxen  narcotic medicines (opiates) for pain  phenothiazines like chlorpromazine, mesoridazine, prochlorperazine, thioridazine  What if I miss a dose?  If you miss a dose, take it as soon as you can. If it is almost time for your next dose, take only that dose. Do not take double or extra doses.  Where should I keep my medicine?  Keep out of reach of children.  This medicine may cause accidental overdose and death if it taken by other adults, children, or pets. Mix any unused medicine with a substance like cat litter or coffee grounds. Then throw the medicine away in a sealed container like a sealed bag or a coffee can with a lid. Do not use the medicine after the expiration date.  Store at room temperature between 15 and 30 degrees C (59 and 86 degrees F).  What should I tell my health care provider before I take this medicine?  They need to know if you have any of these conditions:  kidney disease  suicidal thoughts, plans, or attempt; a previous suicide attempt by you or a family member  an unusual or allergic reaction to gabapentin, other medicines, foods, dyes, or preservatives  pregnant or trying to get pregnant  breast-feeding  What should I watch for while using this medicine?  Visit your doctor or health care professional for regular checks on your progress. You may want to keep a record at home of how you feel your condition is responding to treatment. You may want to share this information with your doctor or health care professional at each visit. You should contact your doctor or health care professional if your seizures get worse or if you have any new types of seizures. Do not stop taking this medicine or any of your seizure medicines unless instructed by your doctor or health care professional.  Stopping your medicine suddenly can increase your seizures or their severity.  Wear a medical identification bracelet or chain if you are taking this medicine for seizures, and carry a card that lists all your medications.  You may get drowsy, dizzy, or have blurred vision. Do not drive, use machinery, or do anything that needs mental alertness until you know how this medicine affects you. To reduce dizzy or fainting spells, do not sit or stand up quickly, especially if you are an older patient. Alcohol can increase drowsiness and dizziness. Avoid alcoholic drinks.  Your mouth may get dry. Chewing sugarless gum or sucking hard candy, and drinking plenty of water will help.  The use of this medicine may increase the chance of suicidal thoughts or actions. Pay special attention to how you are responding while on this medicine. Any worsening of mood, or thoughts of suicide or dying should be reported to your health care professional right away.  Women who become pregnant while using this medicine may enroll in the [place] Antiepileptic Drug Pregnancy Registry by calling 1-730.444.5744. This registry collects information about the safety of antiepileptic drug use during pregnancy.  NOTE:This sheet is a summary. It may not cover all possible information. If you have questions about this medicine, talk to your doctor, pharmacist, or health care provider. Copyright© 2017 [employer]

## 2020-06-17 ENCOUNTER — TELEPHONE (OUTPATIENT)
Dept: INTERNAL MEDICINE | Facility: CLINIC | Age: 60
End: 2020-06-17

## 2020-06-17 NOTE — TELEPHONE ENCOUNTER
----- Message from Kavin Miner sent at 6/17/2020  1:05 PM CDT -----  Regarding: Patient Advice  Contact: Debbie (spouse)  Name of Who is Calling: JONNY TODD [[id number]]      What is the request in detail: Would like to speak with staff in regards to obtaining a medication list for patient. Please advise, if patient is on contact list.       Can the clinic reply by [facility]: no      What Number to Call Back if not in [facility]: [phone]

## 2020-06-22 ENCOUNTER — TELEPHONE (OUTPATIENT)
Dept: INTERNAL MEDICINE | Facility: CLINIC | Age: 60
End: 2020-06-22

## 2020-06-22 NOTE — TELEPHONE ENCOUNTER
----- Message from Leslie Guardado sent at 6/22/2020  9:03 AM CDT -----      Can the clinic reply in [facility]: No       Please refill the medication(s) listed below. Please call the patient when the prescription(s) is ready for  at this phone number   [phone]      Medication #1 felodipine (PLENDIL) 10 MG 24 hr tablet    Medication #2 lisinopriL (PRINIVIL,ZESTRIL) 40 MG tablet    Medication #3 metFORMIN (GLUCOPHAGE) 500 MG tablet    Medication #4 metoprolol tartrate (LOPRESSOR) 100 MG tablet    Medication #5 rosuvastatin (CRESTOR) 20 MG tablet      Preferred Pharmacy: [facility]

## 2020-06-24 ENCOUNTER — PATIENT OUTREACH (OUTPATIENT)
Dept: ADMINISTRATIVE | Facility: HOSPITAL | Age: 60
End: 2020-06-24

## 2020-07-14 ENCOUNTER — PATIENT OUTREACH (OUTPATIENT)
Dept: ADMINISTRATIVE | Facility: OTHER | Age: 60
End: 2020-07-14

## 2020-07-15 NOTE — PROGRESS NOTES
Chart reviewed.   Immunizations: Triggered Imm Registry     Orders placed: n/a  Upcoming appts to satisfy SARHA topics: n/a

## 2020-07-31 ENCOUNTER — PATIENT OUTREACH (OUTPATIENT)
Dept: ADMINISTRATIVE | Facility: OTHER | Age: 60
End: 2020-07-31

## 2020-07-31 NOTE — PROGRESS NOTES
Chart reviewed.   Immunizations: Triggered Imm Registry     Orders placed: n/a  Upcoming appts to satisfy SARAH topics: n/a

## 2021-05-06 ENCOUNTER — OFFICE VISIT (OUTPATIENT)
Dept: UROLOGY | Facility: CLINIC | Age: 61
End: 2021-05-06
Payer: COMMERCIAL

## 2021-05-06 VITALS
BODY MASS INDEX: 37.2 KG/M2 | SYSTOLIC BLOOD PRESSURE: 198 MMHG | HEIGHT: 69 IN | DIASTOLIC BLOOD PRESSURE: 110 MMHG | HEART RATE: 80 BPM | TEMPERATURE: 99 F | WEIGHT: 251.13 LBS

## 2021-05-06 DIAGNOSIS — N40.1 BENIGN PROSTATIC HYPERPLASIA WITH WEAK URINARY STREAM: ICD-10-CM

## 2021-05-06 DIAGNOSIS — N52.9 ERECTILE DYSFUNCTION, UNSPECIFIED ERECTILE DYSFUNCTION TYPE: Primary | ICD-10-CM

## 2021-05-06 DIAGNOSIS — R03.0 ELEVATED BLOOD PRESSURE READING: ICD-10-CM

## 2021-05-06 DIAGNOSIS — R39.12 BENIGN PROSTATIC HYPERPLASIA WITH WEAK URINARY STREAM: ICD-10-CM

## 2021-05-06 PROCEDURE — 3008F BODY MASS INDEX DOCD: CPT | Mod: CPTII,S$GLB,, | Performed by: UROLOGY

## 2021-05-06 PROCEDURE — 3008F PR BODY MASS INDEX (BMI) DOCUMENTED: ICD-10-PCS | Mod: CPTII,S$GLB,, | Performed by: UROLOGY

## 2021-05-06 PROCEDURE — 1126F PR PAIN SEVERITY QUANTIFIED, NO PAIN PRESENT: ICD-10-PCS | Mod: S$GLB,,, | Performed by: UROLOGY

## 2021-05-06 PROCEDURE — 99999 PR PBB SHADOW E&M-EST. PATIENT-LVL IV: CPT | Mod: PBBFAC,,, | Performed by: UROLOGY

## 2021-05-06 PROCEDURE — 1126F AMNT PAIN NOTED NONE PRSNT: CPT | Mod: S$GLB,,, | Performed by: UROLOGY

## 2021-05-06 PROCEDURE — 99213 OFFICE O/P EST LOW 20 MIN: CPT | Mod: S$GLB,,, | Performed by: UROLOGY

## 2021-05-06 PROCEDURE — 99213 PR OFFICE/OUTPT VISIT, EST, LEVL III, 20-29 MIN: ICD-10-PCS | Mod: S$GLB,,, | Performed by: UROLOGY

## 2021-05-06 PROCEDURE — 99999 PR PBB SHADOW E&M-EST. PATIENT-LVL IV: ICD-10-PCS | Mod: PBBFAC,,, | Performed by: UROLOGY

## 2021-05-06 RX ORDER — TAMSULOSIN HYDROCHLORIDE 0.4 MG/1
0.4 CAPSULE ORAL DAILY
Qty: 30 CAPSULE | Refills: 11 | Status: SHIPPED | OUTPATIENT
Start: 2021-05-06 | End: 2021-11-15 | Stop reason: SDUPTHER

## 2021-05-06 RX ORDER — TADALAFIL 20 MG/1
20 TABLET ORAL DAILY
Qty: 30 TABLET | Refills: 5 | Status: SHIPPED | OUTPATIENT
Start: 2021-05-06 | End: 2022-05-06

## 2021-06-22 ENCOUNTER — TELEPHONE (OUTPATIENT)
Dept: UROLOGY | Facility: CLINIC | Age: 61
End: 2021-06-22

## 2021-07-16 ENCOUNTER — OFFICE VISIT (OUTPATIENT)
Dept: UROLOGY | Facility: CLINIC | Age: 61
End: 2021-07-16
Payer: COMMERCIAL

## 2021-07-16 VITALS
BODY MASS INDEX: 35.1 KG/M2 | DIASTOLIC BLOOD PRESSURE: 100 MMHG | WEIGHT: 237 LBS | SYSTOLIC BLOOD PRESSURE: 184 MMHG | HEIGHT: 69 IN

## 2021-07-16 DIAGNOSIS — R39.12 BENIGN PROSTATIC HYPERPLASIA WITH WEAK URINARY STREAM: Primary | ICD-10-CM

## 2021-07-16 DIAGNOSIS — N40.1 BENIGN PROSTATIC HYPERPLASIA WITH WEAK URINARY STREAM: Primary | ICD-10-CM

## 2021-07-16 PROCEDURE — 3008F BODY MASS INDEX DOCD: CPT | Mod: CPTII,S$GLB,, | Performed by: UROLOGY

## 2021-07-16 PROCEDURE — 99214 OFFICE O/P EST MOD 30 MIN: CPT | Mod: S$GLB,,, | Performed by: UROLOGY

## 2021-07-16 PROCEDURE — 3008F PR BODY MASS INDEX (BMI) DOCUMENTED: ICD-10-PCS | Mod: CPTII,S$GLB,, | Performed by: UROLOGY

## 2021-07-16 PROCEDURE — 99999 PR PBB SHADOW E&M-EST. PATIENT-LVL IV: ICD-10-PCS | Mod: PBBFAC,,, | Performed by: UROLOGY

## 2021-07-16 PROCEDURE — 99214 PR OFFICE/OUTPT VISIT, EST, LEVL IV, 30-39 MIN: ICD-10-PCS | Mod: S$GLB,,, | Performed by: UROLOGY

## 2021-07-16 PROCEDURE — 1126F AMNT PAIN NOTED NONE PRSNT: CPT | Mod: S$GLB,,, | Performed by: UROLOGY

## 2021-07-16 PROCEDURE — 99999 PR PBB SHADOW E&M-EST. PATIENT-LVL IV: CPT | Mod: PBBFAC,,, | Performed by: UROLOGY

## 2021-07-16 PROCEDURE — 1126F PR PAIN SEVERITY QUANTIFIED, NO PAIN PRESENT: ICD-10-PCS | Mod: S$GLB,,, | Performed by: UROLOGY

## 2021-08-02 ENCOUNTER — DOCUMENTATION ONLY (OUTPATIENT)
Dept: INTERNAL MEDICINE | Facility: CLINIC | Age: 61
End: 2021-08-02

## 2021-09-04 ENCOUNTER — HOSPITAL ENCOUNTER (EMERGENCY)
Facility: OTHER | Age: 61
Discharge: HOME | End: 2021-09-04
Attending: EMERGENCY MEDICINE
Payer: MEDICAID

## 2021-09-04 VITALS
WEIGHT: 233 LBS | SYSTOLIC BLOOD PRESSURE: 176 MMHG | OXYGEN SATURATION: 96 % | DIASTOLIC BLOOD PRESSURE: 107 MMHG | HEART RATE: 80 BPM | RESPIRATION RATE: 18 BRPM | HEIGHT: 69 IN | BODY MASS INDEX: 34.51 KG/M2 | TEMPERATURE: 98 F

## 2021-09-04 DIAGNOSIS — R90.89 ABNORMAL BRAIN CT: ICD-10-CM

## 2021-09-04 DIAGNOSIS — E87.6 HYPOKALEMIA: ICD-10-CM

## 2021-09-04 DIAGNOSIS — R70.0 ELEVATED ERYTHROCYTE SEDIMENTATION RATE: ICD-10-CM

## 2021-09-04 DIAGNOSIS — I10 HYPERTENSION, UNSPECIFIED TYPE: ICD-10-CM

## 2021-09-04 DIAGNOSIS — M62.81 MUSCLE LEFT ARM WEAKNESS: Primary | ICD-10-CM

## 2021-09-04 DIAGNOSIS — E04.1 THYROID NODULE: ICD-10-CM

## 2021-09-04 DIAGNOSIS — I10 ESSENTIAL HYPERTENSION: ICD-10-CM

## 2021-09-04 DIAGNOSIS — R79.89 ELEVATED SERUM CREATININE: ICD-10-CM

## 2021-09-04 LAB
ALBUMIN SERPL BCP-MCNC: 3.8 G/DL (ref 3.5–5.2)
ALP SERPL-CCNC: 86 U/L (ref 55–135)
ALT SERPL W/O P-5'-P-CCNC: 21 U/L (ref 10–44)
ANION GAP SERPL CALC-SCNC: 13 MMOL/L (ref 8–16)
AST SERPL-CCNC: 32 U/L (ref 10–40)
B-OH-BUTYR BLD STRIP-SCNC: 0.2 MMOL/L (ref 0–0.5)
BACTERIA #/AREA URNS HPF: NORMAL /HPF
BASOPHILS # BLD AUTO: 0.07 K/UL (ref 0–0.2)
BASOPHILS NFR BLD: 1 % (ref 0–1.9)
BILIRUB SERPL-MCNC: 1.1 MG/DL (ref 0.1–1)
BILIRUB UR QL STRIP: NEGATIVE
BUN SERPL-MCNC: 22 MG/DL (ref 8–23)
CALCIUM SERPL-MCNC: 9.2 MG/DL (ref 8.7–10.5)
CHLORIDE SERPL-SCNC: 104 MMOL/L (ref 95–110)
CLARITY UR: CLEAR
CO2 SERPL-SCNC: 26 MMOL/L (ref 23–29)
COLOR UR: YELLOW
CREAT SERPL-MCNC: 1.5 MG/DL (ref 0.5–1.4)
CRP SERPL-MCNC: 5.7 MG/L (ref 0–8.2)
DIFFERENTIAL METHOD: ABNORMAL
EOSINOPHIL # BLD AUTO: 0.4 K/UL (ref 0–0.5)
EOSINOPHIL NFR BLD: 5.7 % (ref 0–8)
ERYTHROCYTE [DISTWIDTH] IN BLOOD BY AUTOMATED COUNT: 15.2 % (ref 11.5–14.5)
ERYTHROCYTE [SEDIMENTATION RATE] IN BLOOD: 20 MM/HR (ref 0–10)
EST. GFR  (AFRICAN AMERICAN): 56 ML/MIN/1.73 M^2
EST. GFR  (NON AFRICAN AMERICAN): 49 ML/MIN/1.73 M^2
GLUCOSE SERPL-MCNC: 99 MG/DL (ref 70–110)
GLUCOSE UR QL STRIP: NEGATIVE
HCT VFR BLD AUTO: 43.9 % (ref 40–54)
HCV AB SERPL QL IA: NEGATIVE
HGB BLD-MCNC: 14.7 G/DL (ref 14–18)
HGB UR QL STRIP: ABNORMAL
HIV 1+2 AB+HIV1 P24 AG SERPL QL IA: NEGATIVE
HYALINE CASTS #/AREA URNS LPF: 0 /LPF
IMM GRANULOCYTES # BLD AUTO: 0.01 K/UL (ref 0–0.04)
IMM GRANULOCYTES NFR BLD AUTO: 0.1 % (ref 0–0.5)
KETONES UR QL STRIP: NEGATIVE
LEUKOCYTE ESTERASE UR QL STRIP: NEGATIVE
LYMPHOCYTES # BLD AUTO: 2.8 K/UL (ref 1–4.8)
LYMPHOCYTES NFR BLD: 38.4 % (ref 18–48)
MCH RBC QN AUTO: 27.3 PG (ref 27–31)
MCHC RBC AUTO-ENTMCNC: 33.5 G/DL (ref 32–36)
MCV RBC AUTO: 82 FL (ref 82–98)
MICROSCOPIC COMMENT: NORMAL
MONOCYTES # BLD AUTO: 0.8 K/UL (ref 0.3–1)
MONOCYTES NFR BLD: 10.4 % (ref 4–15)
NEUTROPHILS # BLD AUTO: 3.2 K/UL (ref 1.8–7.7)
NEUTROPHILS NFR BLD: 44.4 % (ref 38–73)
NITRITE UR QL STRIP: NEGATIVE
NRBC BLD-RTO: 0 /100 WBC
PH UR STRIP: 6 [PH] (ref 5–8)
PLATELET # BLD AUTO: 242 K/UL (ref 150–450)
PMV BLD AUTO: 11.4 FL (ref 9.2–12.9)
POTASSIUM SERPL-SCNC: 3.2 MMOL/L (ref 3.5–5.1)
PROT SERPL-MCNC: 7.9 G/DL (ref 6–8.4)
PROT UR QL STRIP: ABNORMAL
RBC # BLD AUTO: 5.38 M/UL (ref 4.6–6.2)
RBC #/AREA URNS HPF: 2 /HPF (ref 0–4)
SODIUM SERPL-SCNC: 143 MMOL/L (ref 136–145)
SP GR UR STRIP: 1.02 (ref 1–1.03)
SQUAMOUS #/AREA URNS HPF: 3 /HPF
URN SPEC COLLECT METH UR: ABNORMAL
UROBILINOGEN UR STRIP-ACNC: NEGATIVE EU/DL
WBC # BLD AUTO: 7.19 K/UL (ref 3.9–12.7)
WBC #/AREA URNS HPF: 2 /HPF (ref 0–5)

## 2021-09-04 PROCEDURE — 86140 C-REACTIVE PROTEIN: CPT | Performed by: PHYSICIAN ASSISTANT

## 2021-09-04 PROCEDURE — 81000 URINALYSIS NONAUTO W/SCOPE: CPT | Performed by: PHYSICIAN ASSISTANT

## 2021-09-04 PROCEDURE — 25000003 PHARM REV CODE 250: Performed by: PHYSICIAN ASSISTANT

## 2021-09-04 PROCEDURE — 85651 RBC SED RATE NONAUTOMATED: CPT | Performed by: PHYSICIAN ASSISTANT

## 2021-09-04 PROCEDURE — 80053 COMPREHEN METABOLIC PANEL: CPT | Performed by: PHYSICIAN ASSISTANT

## 2021-09-04 PROCEDURE — 85025 COMPLETE CBC W/AUTO DIFF WBC: CPT | Performed by: PHYSICIAN ASSISTANT

## 2021-09-04 PROCEDURE — 86803 HEPATITIS C AB TEST: CPT | Performed by: EMERGENCY MEDICINE

## 2021-09-04 PROCEDURE — 87389 HIV-1 AG W/HIV-1&-2 AB AG IA: CPT | Performed by: EMERGENCY MEDICINE

## 2021-09-04 PROCEDURE — 96360 HYDRATION IV INFUSION INIT: CPT

## 2021-09-04 PROCEDURE — 99284 EMERGENCY DEPT VISIT MOD MDM: CPT | Mod: 25

## 2021-09-04 PROCEDURE — 82010 KETONE BODYS QUAN: CPT | Performed by: PHYSICIAN ASSISTANT

## 2021-09-04 RX ORDER — SODIUM CHLORIDE 9 MG/ML
INJECTION, SOLUTION INTRAVENOUS
Status: COMPLETED | OUTPATIENT
Start: 2021-09-04 | End: 2021-09-04

## 2021-09-04 RX ORDER — POTASSIUM CHLORIDE 20 MEQ/1
40 TABLET, EXTENDED RELEASE ORAL
Status: COMPLETED | OUTPATIENT
Start: 2021-09-04 | End: 2021-09-04

## 2021-09-04 RX ORDER — LANOLIN ALCOHOL/MO/W.PET/CERES
400 CREAM (GRAM) TOPICAL DAILY
Qty: 5 TABLET | Refills: 0 | Status: SHIPPED | OUTPATIENT
Start: 2021-09-04 | End: 2021-09-09

## 2021-09-04 RX ORDER — POTASSIUM CHLORIDE 20 MEQ/1
20 TABLET, EXTENDED RELEASE ORAL DAILY
Qty: 3 TABLET | Refills: 0 | Status: SHIPPED | OUTPATIENT
Start: 2021-09-04 | End: 2021-09-07

## 2021-09-04 RX ORDER — FELODIPINE 10 MG/1
10 TABLET, EXTENDED RELEASE ORAL DAILY
Qty: 90 TABLET | Refills: 1 | Status: SHIPPED | OUTPATIENT
Start: 2021-09-04 | End: 2022-04-26 | Stop reason: SDUPTHER

## 2021-09-04 RX ORDER — LISINOPRIL 40 MG/1
40 TABLET ORAL DAILY
Qty: 10 TABLET | Refills: 0 | Status: SHIPPED | OUTPATIENT
Start: 2021-09-04 | End: 2021-09-17

## 2021-09-04 RX ADMIN — POTASSIUM CHLORIDE 40 MEQ: 1500 TABLET, EXTENDED RELEASE ORAL at 02:09

## 2021-09-04 RX ADMIN — SODIUM CHLORIDE: 0.9 INJECTION, SOLUTION INTRAVENOUS at 02:09

## 2021-09-06 ENCOUNTER — HOSPITAL ENCOUNTER (EMERGENCY)
Facility: OTHER | Age: 61
Discharge: HOME | End: 2021-09-06
Attending: EMERGENCY MEDICINE
Payer: MEDICAID

## 2021-09-06 VITALS
TEMPERATURE: 98 F | DIASTOLIC BLOOD PRESSURE: 84 MMHG | BODY MASS INDEX: 34.51 KG/M2 | WEIGHT: 233 LBS | OXYGEN SATURATION: 95 % | SYSTOLIC BLOOD PRESSURE: 145 MMHG | HEART RATE: 76 BPM | RESPIRATION RATE: 18 BRPM | HEIGHT: 69 IN

## 2021-09-06 DIAGNOSIS — M79.603 ARM PAIN: ICD-10-CM

## 2021-09-06 DIAGNOSIS — M54.12 CERVICAL RADICULOPATHY: Primary | ICD-10-CM

## 2021-09-06 PROCEDURE — 93010 ELECTROCARDIOGRAM REPORT: CPT | Mod: ,,, | Performed by: INTERNAL MEDICINE

## 2021-09-06 PROCEDURE — 93010 EKG 12-LEAD: ICD-10-PCS | Mod: ,,, | Performed by: INTERNAL MEDICINE

## 2021-09-06 PROCEDURE — 25000003 PHARM REV CODE 250: Performed by: EMERGENCY MEDICINE

## 2021-09-06 PROCEDURE — 96372 THER/PROPH/DIAG INJ SC/IM: CPT

## 2021-09-06 PROCEDURE — 63600175 PHARM REV CODE 636 W HCPCS: Performed by: EMERGENCY MEDICINE

## 2021-09-06 PROCEDURE — 99285 EMERGENCY DEPT VISIT HI MDM: CPT | Mod: 25

## 2021-09-06 PROCEDURE — 93005 ELECTROCARDIOGRAM TRACING: CPT

## 2021-09-06 RX ORDER — METOPROLOL TARTRATE 50 MG/1
100 TABLET ORAL
Status: COMPLETED | OUTPATIENT
Start: 2021-09-06 | End: 2021-09-06

## 2021-09-06 RX ORDER — HYDROCODONE BITARTRATE AND ACETAMINOPHEN 5; 325 MG/1; MG/1
1 TABLET ORAL
Status: COMPLETED | OUTPATIENT
Start: 2021-09-06 | End: 2021-09-06

## 2021-09-06 RX ORDER — CYCLOBENZAPRINE HCL 10 MG
10 TABLET ORAL 3 TIMES DAILY PRN
Qty: 15 TABLET | Refills: 0 | Status: SHIPPED | OUTPATIENT
Start: 2021-09-06 | End: 2021-09-12

## 2021-09-06 RX ORDER — LISINOPRIL 10 MG/1
40 TABLET ORAL
Status: COMPLETED | OUTPATIENT
Start: 2021-09-06 | End: 2021-09-06

## 2021-09-06 RX ORDER — LISINOPRIL 40 MG/1
40 TABLET ORAL DAILY
Qty: 30 TABLET | Refills: 0 | Status: SHIPPED | OUTPATIENT
Start: 2021-09-06 | End: 2021-11-15 | Stop reason: SDUPTHER

## 2021-09-06 RX ORDER — ORPHENADRINE CITRATE 30 MG/ML
60 INJECTION INTRAMUSCULAR; INTRAVENOUS
Status: COMPLETED | OUTPATIENT
Start: 2021-09-06 | End: 2021-09-06

## 2021-09-06 RX ORDER — KETOROLAC TROMETHAMINE 30 MG/ML
10 INJECTION, SOLUTION INTRAMUSCULAR; INTRAVENOUS
Status: COMPLETED | OUTPATIENT
Start: 2021-09-06 | End: 2021-09-06

## 2021-09-06 RX ORDER — HYDROCODONE BITARTRATE AND ACETAMINOPHEN 5; 325 MG/1; MG/1
1 TABLET ORAL EVERY 4 HOURS PRN
Qty: 12 TABLET | Refills: 0 | Status: SHIPPED | OUTPATIENT
Start: 2021-09-06

## 2021-09-06 RX ORDER — METOPROLOL TARTRATE 100 MG/1
100 TABLET ORAL 2 TIMES DAILY
Qty: 60 TABLET | Refills: 0 | Status: SHIPPED | OUTPATIENT
Start: 2021-09-06 | End: 2021-10-06

## 2021-09-06 RX ADMIN — ORPHENADRINE CITRATE 60 MG: 60 INJECTION INTRAMUSCULAR; INTRAVENOUS at 10:09

## 2021-09-06 RX ADMIN — KETOROLAC TROMETHAMINE 10 MG: 30 INJECTION, SOLUTION INTRAMUSCULAR; INTRAVENOUS at 10:09

## 2021-09-06 RX ADMIN — LISINOPRIL 40 MG: 10 TABLET ORAL at 12:09

## 2021-09-06 RX ADMIN — HYDROCODONE BITARTRATE AND ACETAMINOPHEN 1 TABLET: 5; 325 TABLET ORAL at 10:09

## 2021-09-06 RX ADMIN — METOPROLOL TARTRATE 100 MG: 50 TABLET, FILM COATED ORAL at 12:09

## 2021-09-07 ENCOUNTER — TELEPHONE (OUTPATIENT)
Dept: ADMINISTRATIVE | Facility: OTHER | Age: 61
End: 2021-09-07

## 2021-09-15 ENCOUNTER — OUTPATIENT CASE MANAGEMENT (OUTPATIENT)
Dept: ADMINISTRATIVE | Facility: OTHER | Age: 61
End: 2021-09-15

## 2021-09-21 ENCOUNTER — OUTPATIENT CASE MANAGEMENT (OUTPATIENT)
Dept: ADMINISTRATIVE | Facility: OTHER | Age: 61
End: 2021-09-21

## 2021-09-22 ENCOUNTER — OUTPATIENT CASE MANAGEMENT (OUTPATIENT)
Dept: ADMINISTRATIVE | Facility: OTHER | Age: 61
End: 2021-09-22

## 2021-09-23 ENCOUNTER — OUTPATIENT CASE MANAGEMENT (OUTPATIENT)
Dept: ADMINISTRATIVE | Facility: OTHER | Age: 61
End: 2021-09-23

## 2021-10-11 ENCOUNTER — OUTPATIENT CASE MANAGEMENT (OUTPATIENT)
Dept: ADMINISTRATIVE | Facility: OTHER | Age: 61
End: 2021-10-11

## 2021-10-14 ENCOUNTER — OUTPATIENT CASE MANAGEMENT (OUTPATIENT)
Dept: ADMINISTRATIVE | Facility: OTHER | Age: 61
End: 2021-10-14

## 2021-10-18 ENCOUNTER — OUTPATIENT CASE MANAGEMENT (OUTPATIENT)
Dept: ADMINISTRATIVE | Facility: OTHER | Age: 61
End: 2021-10-18

## 2021-11-04 ENCOUNTER — PATIENT OUTREACH (OUTPATIENT)
Dept: ADMINISTRATIVE | Facility: HOSPITAL | Age: 61
End: 2021-11-04
Payer: MEDICAID

## 2021-11-15 ENCOUNTER — LAB VISIT (OUTPATIENT)
Dept: LAB | Facility: HOSPITAL | Age: 61
End: 2021-11-15
Attending: NURSE PRACTITIONER
Payer: MEDICAID

## 2021-11-15 ENCOUNTER — OFFICE VISIT (OUTPATIENT)
Dept: PRIMARY CARE CLINIC | Facility: CLINIC | Age: 61
End: 2021-11-15
Payer: MEDICAID

## 2021-11-15 VITALS
OXYGEN SATURATION: 99 % | WEIGHT: 228.81 LBS | BODY MASS INDEX: 33.89 KG/M2 | HEIGHT: 69 IN | HEART RATE: 67 BPM | TEMPERATURE: 97 F

## 2021-11-15 DIAGNOSIS — Z11.59 ENCOUNTER FOR HEPATITIS C SCREENING TEST FOR LOW RISK PATIENT: ICD-10-CM

## 2021-11-15 DIAGNOSIS — Z12.5 ENCOUNTER FOR PROSTATE CANCER SCREENING: ICD-10-CM

## 2021-11-15 DIAGNOSIS — E11.42 TYPE 2 DIABETES MELLITUS WITH DIABETIC POLYNEUROPATHY, WITH LONG-TERM CURRENT USE OF INSULIN: ICD-10-CM

## 2021-11-15 DIAGNOSIS — I63.9 CEREBROVASCULAR ACCIDENT (CVA), UNSPECIFIED MECHANISM: ICD-10-CM

## 2021-11-15 DIAGNOSIS — I10 ESSENTIAL HYPERTENSION: Primary | ICD-10-CM

## 2021-11-15 DIAGNOSIS — R26.9 GAIT DISORDER: ICD-10-CM

## 2021-11-15 DIAGNOSIS — Z12.12 SCREENING FOR COLORECTAL CANCER: ICD-10-CM

## 2021-11-15 DIAGNOSIS — Z79.4 TYPE 2 DIABETES MELLITUS WITH DIABETIC POLYNEUROPATHY, WITH LONG-TERM CURRENT USE OF INSULIN: ICD-10-CM

## 2021-11-15 DIAGNOSIS — Z12.11 SCREENING FOR COLORECTAL CANCER: ICD-10-CM

## 2021-11-15 DIAGNOSIS — Z11.4 SCREENING FOR HIV (HUMAN IMMUNODEFICIENCY VIRUS): ICD-10-CM

## 2021-11-15 DIAGNOSIS — Z86.39 HISTORY OF METABOLIC AND NUTRITIONAL DISORDER: ICD-10-CM

## 2021-11-15 DIAGNOSIS — E78.5 HYPERLIPIDEMIA, UNSPECIFIED HYPERLIPIDEMIA TYPE: ICD-10-CM

## 2021-11-15 DIAGNOSIS — R53.1 ASTHENIA: ICD-10-CM

## 2021-11-15 DIAGNOSIS — N40.0 BENIGN PROSTATIC HYPERPLASIA, UNSPECIFIED WHETHER LOWER URINARY TRACT SYMPTOMS PRESENT: ICD-10-CM

## 2021-11-15 DIAGNOSIS — I10 ESSENTIAL HYPERTENSION: ICD-10-CM

## 2021-11-15 LAB
ALBUMIN SERPL BCP-MCNC: 3.7 G/DL (ref 3.5–5.2)
ALP SERPL-CCNC: 78 U/L (ref 55–135)
ALT SERPL W/O P-5'-P-CCNC: 13 U/L (ref 10–44)
ANION GAP SERPL CALC-SCNC: 8 MMOL/L (ref 8–16)
AST SERPL-CCNC: 17 U/L (ref 10–40)
BASOPHILS # BLD AUTO: 0.08 K/UL (ref 0–0.2)
BASOPHILS NFR BLD: 1.5 % (ref 0–1.9)
BILIRUB SERPL-MCNC: 0.5 MG/DL (ref 0.1–1)
BUN SERPL-MCNC: 18 MG/DL (ref 8–23)
CALCIUM SERPL-MCNC: 9.5 MG/DL (ref 8.7–10.5)
CHLORIDE SERPL-SCNC: 105 MMOL/L (ref 95–110)
CHOLEST SERPL-MCNC: 223 MG/DL (ref 120–199)
CHOLEST/HDLC SERPL: 6 {RATIO} (ref 2–5)
CO2 SERPL-SCNC: 30 MMOL/L (ref 23–29)
COMPLEXED PSA SERPL-MCNC: 5.7 NG/ML (ref 0–4)
CREAT SERPL-MCNC: 1.2 MG/DL (ref 0.5–1.4)
DIFFERENTIAL METHOD: ABNORMAL
EOSINOPHIL # BLD AUTO: 0.3 K/UL (ref 0–0.5)
EOSINOPHIL NFR BLD: 5.9 % (ref 0–8)
ERYTHROCYTE [DISTWIDTH] IN BLOOD BY AUTOMATED COUNT: 15.6 % (ref 11.5–14.5)
EST. GFR  (AFRICAN AMERICAN): >60 ML/MIN/1.73 M^2
EST. GFR  (NON AFRICAN AMERICAN): >60 ML/MIN/1.73 M^2
ESTIMATED AVG GLUCOSE: 123 MG/DL (ref 68–131)
GLUCOSE SERPL-MCNC: 96 MG/DL (ref 70–110)
HBA1C MFR BLD: 5.9 % (ref 4–5.6)
HCT VFR BLD AUTO: 44.1 % (ref 40–54)
HDLC SERPL-MCNC: 37 MG/DL (ref 40–75)
HDLC SERPL: 16.6 % (ref 20–50)
HGB BLD-MCNC: 14.1 G/DL (ref 14–18)
IMM GRANULOCYTES # BLD AUTO: 0 K/UL (ref 0–0.04)
IMM GRANULOCYTES NFR BLD AUTO: 0 % (ref 0–0.5)
LDLC SERPL CALC-MCNC: 160.8 MG/DL (ref 63–159)
LYMPHOCYTES # BLD AUTO: 1.7 K/UL (ref 1–4.8)
LYMPHOCYTES NFR BLD: 33 % (ref 18–48)
MCH RBC QN AUTO: 27.4 PG (ref 27–31)
MCHC RBC AUTO-ENTMCNC: 32 G/DL (ref 32–36)
MCV RBC AUTO: 86 FL (ref 82–98)
MONOCYTES # BLD AUTO: 0.5 K/UL (ref 0.3–1)
MONOCYTES NFR BLD: 10.1 % (ref 4–15)
NEUTROPHILS # BLD AUTO: 2.6 K/UL (ref 1.8–7.7)
NEUTROPHILS NFR BLD: 49.5 % (ref 38–73)
NONHDLC SERPL-MCNC: 186 MG/DL
NRBC BLD-RTO: 0 /100 WBC
PLATELET # BLD AUTO: 231 K/UL (ref 150–450)
PMV BLD AUTO: 11.8 FL (ref 9.2–12.9)
POTASSIUM SERPL-SCNC: 3.4 MMOL/L (ref 3.5–5.1)
PROT SERPL-MCNC: 7.7 G/DL (ref 6–8.4)
RBC # BLD AUTO: 5.14 M/UL (ref 4.6–6.2)
SODIUM SERPL-SCNC: 143 MMOL/L (ref 136–145)
T4 FREE SERPL-MCNC: 0.86 NG/DL (ref 0.71–1.51)
TRIGL SERPL-MCNC: 126 MG/DL (ref 30–150)
TSH SERPL DL<=0.005 MIU/L-ACNC: 0.85 UIU/ML (ref 0.4–4)
WBC # BLD AUTO: 5.25 K/UL (ref 3.9–12.7)

## 2021-11-15 PROCEDURE — 36415 COLL VENOUS BLD VENIPUNCTURE: CPT | Mod: PN | Performed by: NURSE PRACTITIONER

## 2021-11-15 PROCEDURE — 84439 ASSAY OF FREE THYROXINE: CPT | Performed by: NURSE PRACTITIONER

## 2021-11-15 PROCEDURE — 86803 HEPATITIS C AB TEST: CPT | Performed by: NURSE PRACTITIONER

## 2021-11-15 PROCEDURE — 99214 PR OFFICE/OUTPT VISIT, EST, LEVL IV, 30-39 MIN: ICD-10-PCS | Mod: S$PBB,,, | Performed by: NURSE PRACTITIONER

## 2021-11-15 PROCEDURE — 83036 HEMOGLOBIN GLYCOSYLATED A1C: CPT | Performed by: NURSE PRACTITIONER

## 2021-11-15 PROCEDURE — 84153 ASSAY OF PSA TOTAL: CPT | Performed by: NURSE PRACTITIONER

## 2021-11-15 PROCEDURE — 84443 ASSAY THYROID STIM HORMONE: CPT | Performed by: NURSE PRACTITIONER

## 2021-11-15 PROCEDURE — 99999 PR PBB SHADOW E&M-EST. PATIENT-LVL IV: ICD-10-PCS | Mod: PBBFAC,,, | Performed by: NURSE PRACTITIONER

## 2021-11-15 PROCEDURE — 80053 COMPREHEN METABOLIC PANEL: CPT | Performed by: NURSE PRACTITIONER

## 2021-11-15 PROCEDURE — 87389 HIV-1 AG W/HIV-1&-2 AB AG IA: CPT | Performed by: NURSE PRACTITIONER

## 2021-11-15 PROCEDURE — 85025 COMPLETE CBC W/AUTO DIFF WBC: CPT | Performed by: NURSE PRACTITIONER

## 2021-11-15 PROCEDURE — 99214 OFFICE O/P EST MOD 30 MIN: CPT | Mod: PBBFAC,PN | Performed by: NURSE PRACTITIONER

## 2021-11-15 PROCEDURE — 99999 PR PBB SHADOW E&M-EST. PATIENT-LVL IV: CPT | Mod: PBBFAC,,, | Performed by: NURSE PRACTITIONER

## 2021-11-15 PROCEDURE — 80061 LIPID PANEL: CPT | Performed by: NURSE PRACTITIONER

## 2021-11-15 PROCEDURE — 99214 OFFICE O/P EST MOD 30 MIN: CPT | Mod: S$PBB,,, | Performed by: NURSE PRACTITIONER

## 2021-11-15 RX ORDER — LISINOPRIL 40 MG/1
40 TABLET ORAL DAILY
Qty: 30 TABLET | Refills: 0 | Status: SHIPPED | OUTPATIENT
Start: 2021-11-15 | End: 2021-11-15 | Stop reason: SDUPTHER

## 2021-11-15 RX ORDER — METOPROLOL TARTRATE 100 MG/1
100 TABLET ORAL 2 TIMES DAILY
Qty: 180 TABLET | Refills: 1 | Status: SHIPPED | OUTPATIENT
Start: 2021-11-15

## 2021-11-15 RX ORDER — ROSUVASTATIN CALCIUM 20 MG/1
20 TABLET, COATED ORAL DAILY
Qty: 90 TABLET | Refills: 3 | Status: SHIPPED | OUTPATIENT
Start: 2021-11-15

## 2021-11-15 RX ORDER — TAMSULOSIN HYDROCHLORIDE 0.4 MG/1
0.4 CAPSULE ORAL DAILY
Qty: 30 CAPSULE | Refills: 11 | Status: SHIPPED | OUTPATIENT
Start: 2021-11-15 | End: 2022-03-09

## 2021-11-15 RX ORDER — LISINOPRIL 40 MG/1
40 TABLET ORAL DAILY
Qty: 30 TABLET | Refills: 3 | Status: SHIPPED | OUTPATIENT
Start: 2021-11-15 | End: 2021-12-15

## 2021-11-16 ENCOUNTER — TELEPHONE (OUTPATIENT)
Dept: PRIMARY CARE CLINIC | Facility: CLINIC | Age: 61
End: 2021-11-16
Payer: MEDICAID

## 2021-11-16 LAB
HCV AB SERPL QL IA: NEGATIVE
HIV 1+2 AB+HIV1 P24 AG SERPL QL IA: NEGATIVE

## 2021-11-17 DIAGNOSIS — R97.20 PSA ELEVATION: Primary | ICD-10-CM

## 2021-11-18 ENCOUNTER — OFFICE VISIT (OUTPATIENT)
Dept: PRIMARY CARE CLINIC | Facility: CLINIC | Age: 61
End: 2021-11-18
Payer: MEDICAID

## 2021-11-18 VITALS
OXYGEN SATURATION: 96 % | SYSTOLIC BLOOD PRESSURE: 138 MMHG | RESPIRATION RATE: 20 BRPM | DIASTOLIC BLOOD PRESSURE: 82 MMHG | HEART RATE: 78 BPM | WEIGHT: 238.19 LBS | HEIGHT: 69 IN | BODY MASS INDEX: 35.28 KG/M2 | TEMPERATURE: 98 F

## 2021-11-18 DIAGNOSIS — H61.21 IMPACTED CERUMEN OF RIGHT EAR: ICD-10-CM

## 2021-11-18 DIAGNOSIS — H66.91 RIGHT OTITIS MEDIA, UNSPECIFIED OTITIS MEDIA TYPE: Primary | ICD-10-CM

## 2021-11-18 PROCEDURE — 99213 PR OFFICE/OUTPT VISIT, EST, LEVL III, 20-29 MIN: ICD-10-PCS | Mod: S$PBB,,, | Performed by: NURSE PRACTITIONER

## 2021-11-18 PROCEDURE — 99999 PR PBB SHADOW E&M-EST. PATIENT-LVL V: ICD-10-PCS | Mod: PBBFAC,,, | Performed by: NURSE PRACTITIONER

## 2021-11-18 PROCEDURE — 99215 OFFICE O/P EST HI 40 MIN: CPT | Mod: PBBFAC,PN | Performed by: NURSE PRACTITIONER

## 2021-11-18 PROCEDURE — 99213 OFFICE O/P EST LOW 20 MIN: CPT | Mod: S$PBB,,, | Performed by: NURSE PRACTITIONER

## 2021-11-18 PROCEDURE — 99999 PR PBB SHADOW E&M-EST. PATIENT-LVL V: CPT | Mod: PBBFAC,,, | Performed by: NURSE PRACTITIONER

## 2021-11-18 RX ORDER — CIPROFLOXACIN AND DEXAMETHASONE 3; 1 MG/ML; MG/ML
4 SUSPENSION/ DROPS AURICULAR (OTIC) 2 TIMES DAILY
Qty: 7.5 ML | Refills: 0 | Status: SHIPPED | OUTPATIENT
Start: 2021-11-18 | End: 2021-11-25

## 2021-12-01 ENCOUNTER — CLINICAL SUPPORT (OUTPATIENT)
Dept: REHABILITATION | Facility: HOSPITAL | Age: 61
End: 2021-12-01
Payer: MEDICAID

## 2021-12-01 DIAGNOSIS — I63.9 CEREBROVASCULAR ACCIDENT (CVA), UNSPECIFIED MECHANISM: ICD-10-CM

## 2021-12-01 DIAGNOSIS — Z78.9 SELF-CARE DEFICIT: ICD-10-CM

## 2021-12-01 DIAGNOSIS — M25.60 DECREASED RANGE OF MOTION: ICD-10-CM

## 2021-12-01 DIAGNOSIS — M62.81 DECREASED MUSCLE STRENGTH: ICD-10-CM

## 2021-12-01 DIAGNOSIS — R52 PAIN: ICD-10-CM

## 2021-12-01 PROCEDURE — 97110 THERAPEUTIC EXERCISES: CPT

## 2021-12-01 PROCEDURE — 97166 OT EVAL MOD COMPLEX 45 MIN: CPT | Performed by: OCCUPATIONAL THERAPIST

## 2021-12-01 PROCEDURE — 97161 PT EVAL LOW COMPLEX 20 MIN: CPT

## 2021-12-02 ENCOUNTER — TELEPHONE (OUTPATIENT)
Dept: PRIMARY CARE CLINIC | Facility: CLINIC | Age: 61
End: 2021-12-02
Payer: MEDICAID

## 2021-12-06 ENCOUNTER — HOSPITAL ENCOUNTER (OUTPATIENT)
Dept: RADIOLOGY | Facility: HOSPITAL | Age: 61
Discharge: HOME | End: 2021-12-06
Attending: NURSE PRACTITIONER
Payer: MEDICAID

## 2021-12-06 ENCOUNTER — OFFICE VISIT (OUTPATIENT)
Dept: PRIMARY CARE CLINIC | Facility: CLINIC | Age: 61
End: 2021-12-06
Payer: MEDICAID

## 2021-12-06 VITALS
SYSTOLIC BLOOD PRESSURE: 118 MMHG | RESPIRATION RATE: 18 BRPM | WEIGHT: 237 LBS | DIASTOLIC BLOOD PRESSURE: 57 MMHG | HEIGHT: 69 IN | HEART RATE: 68 BPM | TEMPERATURE: 98 F | BODY MASS INDEX: 35.1 KG/M2 | OXYGEN SATURATION: 96 %

## 2021-12-06 DIAGNOSIS — M25.512 ACUTE PAIN OF LEFT SHOULDER: Primary | ICD-10-CM

## 2021-12-06 DIAGNOSIS — M25.512 ACUTE PAIN OF LEFT SHOULDER: ICD-10-CM

## 2021-12-06 DIAGNOSIS — M25.519 SHOULDER PAIN, UNSPECIFIED CHRONICITY, UNSPECIFIED LATERALITY: ICD-10-CM

## 2021-12-06 PROCEDURE — 4010F PR ACE/ARB THEARPY RXD/TAKEN: ICD-10-PCS | Mod: CPTII,,, | Performed by: NURSE PRACTITIONER

## 2021-12-06 PROCEDURE — 99999 PR PBB SHADOW E&M-EST. PATIENT-LVL V: CPT | Mod: PBBFAC,,, | Performed by: NURSE PRACTITIONER

## 2021-12-06 PROCEDURE — 3008F BODY MASS INDEX DOCD: CPT | Mod: CPTII,,, | Performed by: NURSE PRACTITIONER

## 2021-12-06 PROCEDURE — 99214 PR OFFICE/OUTPT VISIT, EST, LEVL IV, 30-39 MIN: ICD-10-PCS | Mod: S$PBB,,, | Performed by: NURSE PRACTITIONER

## 2021-12-06 PROCEDURE — 4010F ACE/ARB THERAPY RXD/TAKEN: CPT | Mod: CPTII,,, | Performed by: NURSE PRACTITIONER

## 2021-12-06 PROCEDURE — 3008F PR BODY MASS INDEX (BMI) DOCUMENTED: ICD-10-PCS | Mod: CPTII,,, | Performed by: NURSE PRACTITIONER

## 2021-12-06 PROCEDURE — 73030 X-RAY EXAM OF SHOULDER: CPT | Mod: TC,PN,LT

## 2021-12-06 PROCEDURE — 73030 X-RAY EXAM OF SHOULDER: CPT | Mod: 26,LT,, | Performed by: RADIOLOGY

## 2021-12-06 PROCEDURE — 3078F DIAST BP <80 MM HG: CPT | Mod: CPTII,,, | Performed by: NURSE PRACTITIONER

## 2021-12-06 PROCEDURE — 3074F SYST BP LT 130 MM HG: CPT | Mod: CPTII,,, | Performed by: NURSE PRACTITIONER

## 2021-12-06 PROCEDURE — 3074F PR MOST RECENT SYSTOLIC BLOOD PRESSURE < 130 MM HG: ICD-10-PCS | Mod: CPTII,,, | Performed by: NURSE PRACTITIONER

## 2021-12-06 PROCEDURE — 1159F PR MEDICATION LIST DOCUMENTED IN MEDICAL RECORD: ICD-10-PCS | Mod: CPTII,,, | Performed by: NURSE PRACTITIONER

## 2021-12-06 PROCEDURE — 1159F MED LIST DOCD IN RCRD: CPT | Mod: CPTII,,, | Performed by: NURSE PRACTITIONER

## 2021-12-06 PROCEDURE — 99214 OFFICE O/P EST MOD 30 MIN: CPT | Mod: S$PBB,,, | Performed by: NURSE PRACTITIONER

## 2021-12-06 PROCEDURE — 3078F PR MOST RECENT DIASTOLIC BLOOD PRESSURE < 80 MM HG: ICD-10-PCS | Mod: CPTII,,, | Performed by: NURSE PRACTITIONER

## 2021-12-06 PROCEDURE — 3044F PR MOST RECENT HEMOGLOBIN A1C LEVEL <7.0%: ICD-10-PCS | Mod: CPTII,,, | Performed by: NURSE PRACTITIONER

## 2021-12-06 PROCEDURE — 73030 XR SHOULDER COMPLETE 2 OR MORE VIEWS LEFT: ICD-10-PCS | Mod: 26,LT,, | Performed by: RADIOLOGY

## 2021-12-06 PROCEDURE — 99999 PR PBB SHADOW E&M-EST. PATIENT-LVL V: ICD-10-PCS | Mod: PBBFAC,,, | Performed by: NURSE PRACTITIONER

## 2021-12-06 PROCEDURE — 99215 OFFICE O/P EST HI 40 MIN: CPT | Mod: PBBFAC,PN | Performed by: NURSE PRACTITIONER

## 2021-12-06 PROCEDURE — 3044F HG A1C LEVEL LT 7.0%: CPT | Mod: CPTII,,, | Performed by: NURSE PRACTITIONER

## 2021-12-06 RX ORDER — DICLOFENAC SODIUM 75 MG/1
75 TABLET, DELAYED RELEASE ORAL 2 TIMES DAILY PRN
Qty: 30 TABLET | Refills: 1 | Status: SHIPPED | OUTPATIENT
Start: 2021-12-06

## 2021-12-06 RX ORDER — TIZANIDINE HYDROCHLORIDE 6 MG/1
6 CAPSULE, GELATIN COATED ORAL 3 TIMES DAILY
Qty: 30 CAPSULE | Refills: 0 | Status: SHIPPED | OUTPATIENT
Start: 2021-12-06 | End: 2021-12-16

## 2021-12-06 RX ORDER — LIDOCAINE 50 MG/G
1 PATCH TOPICAL
COMMUNITY

## 2021-12-06 RX ORDER — METHOCARBAMOL 500 MG/1
500 TABLET, FILM COATED ORAL 4 TIMES DAILY
COMMUNITY
End: 2021-12-06

## 2022-01-16 ENCOUNTER — PATIENT OUTREACH (OUTPATIENT)
Dept: ADMINISTRATIVE | Facility: OTHER | Age: 62
End: 2022-01-16
Payer: MEDICAID

## 2022-02-22 ENCOUNTER — PATIENT OUTREACH (OUTPATIENT)
Dept: ADMINISTRATIVE | Facility: OTHER | Age: 62
End: 2022-02-22
Payer: MEDICAID

## 2022-02-22 DIAGNOSIS — E11.9 TYPE 2 DIABETES MELLITUS WITHOUT COMPLICATION, UNSPECIFIED WHETHER LONG TERM INSULIN USE: Primary | ICD-10-CM

## 2022-02-22 NOTE — PROGRESS NOTES
Care Everywhere: updated  Immunization: updated  Health Maintenance: updated  Media Review: review for outside eye exam report   Legacy Review:   DIS:  Order placed: diabetic eye screening photo, hemoglobin   Upcoming appts:  EFAX:  Task Tickets:  Referrals:

## 2022-02-24 ENCOUNTER — TELEPHONE (OUTPATIENT)
Dept: PRIMARY CARE CLINIC | Facility: CLINIC | Age: 62
End: 2022-02-24
Payer: MEDICAID

## 2022-02-24 NOTE — TELEPHONE ENCOUNTER
Pt has existing referral advised to call urology to est an office appt ----- Message from Trudy Tidwell sent at 2/24/2022 11:00 AM CST -----  .Type:  Needs Medical Advice    Who Called: JONNY TODD [[id number]]  Symptoms (please be specific):  How long has patient had these symptoms:    Pharmacy name and phone #:    Would the patient rather a call back or a response via [facility]?  Best Call Back Number:  [phone]  Additional Information: pt is requesting a referral to urology. Please call

## 2022-02-25 ENCOUNTER — TELEPHONE (OUTPATIENT)
Dept: UROLOGY | Facility: CLINIC | Age: 62
End: 2022-02-25
Payer: MEDICAID

## 2022-02-25 NOTE — TELEPHONE ENCOUNTER
Spoke to Mr. Shields and asked if he was needing to r/s the appt that he missed with Dr. Ray, he agreed. I offered Wednesday 3/9/22 at 11:15 am at [facility], he agreed.

## 2022-02-25 NOTE — TELEPHONE ENCOUNTER
----- Message from Cookie Lott sent at 2/25/2022  3:09 PM CST -----  Contact: Steven Harris called regarding a missed call, please give him a call back at [phone]      Thanks  Kb

## 2022-03-09 ENCOUNTER — LAB VISIT (OUTPATIENT)
Dept: LAB | Facility: HOSPITAL | Age: 62
End: 2022-03-09
Attending: UROLOGY
Payer: MEDICAID

## 2022-03-09 ENCOUNTER — OFFICE VISIT (OUTPATIENT)
Dept: UROLOGY | Facility: CLINIC | Age: 62
End: 2022-03-09
Payer: MEDICAID

## 2022-03-09 VITALS
BODY MASS INDEX: 34.22 KG/M2 | HEIGHT: 69 IN | DIASTOLIC BLOOD PRESSURE: 86 MMHG | WEIGHT: 231.06 LBS | SYSTOLIC BLOOD PRESSURE: 124 MMHG

## 2022-03-09 DIAGNOSIS — R97.20 ELEVATED PSA: ICD-10-CM

## 2022-03-09 DIAGNOSIS — R97.20 ELEVATED PSA: Primary | ICD-10-CM

## 2022-03-09 DIAGNOSIS — R39.12 BENIGN PROSTATIC HYPERPLASIA WITH WEAK URINARY STREAM: ICD-10-CM

## 2022-03-09 DIAGNOSIS — N40.1 BENIGN PROSTATIC HYPERPLASIA WITH WEAK URINARY STREAM: ICD-10-CM

## 2022-03-09 LAB — COMPLEXED PSA SERPL-MCNC: 5.9 NG/ML (ref 0–4)

## 2022-03-09 PROCEDURE — 3079F PR MOST RECENT DIASTOLIC BLOOD PRESSURE 80-89 MM HG: ICD-10-PCS | Mod: CPTII,,, | Performed by: UROLOGY

## 2022-03-09 PROCEDURE — 99214 PR OFFICE/OUTPT VISIT, EST, LEVL IV, 30-39 MIN: ICD-10-PCS | Mod: S$PBB,,, | Performed by: UROLOGY

## 2022-03-09 PROCEDURE — 1159F PR MEDICATION LIST DOCUMENTED IN MEDICAL RECORD: ICD-10-PCS | Mod: CPTII,,, | Performed by: UROLOGY

## 2022-03-09 PROCEDURE — 99999 PR PBB SHADOW E&M-EST. PATIENT-LVL IV: CPT | Mod: PBBFAC,,, | Performed by: UROLOGY

## 2022-03-09 PROCEDURE — 1160F PR REVIEW ALL MEDS BY PRESCRIBER/CLIN PHARMACIST DOCUMENTED: ICD-10-PCS | Mod: CPTII,,, | Performed by: UROLOGY

## 2022-03-09 PROCEDURE — 3079F DIAST BP 80-89 MM HG: CPT | Mod: CPTII,,, | Performed by: UROLOGY

## 2022-03-09 PROCEDURE — 4010F PR ACE/ARB THEARPY RXD/TAKEN: ICD-10-PCS | Mod: CPTII,,, | Performed by: UROLOGY

## 2022-03-09 PROCEDURE — 3074F SYST BP LT 130 MM HG: CPT | Mod: CPTII,,, | Performed by: UROLOGY

## 2022-03-09 PROCEDURE — 1159F MED LIST DOCD IN RCRD: CPT | Mod: CPTII,,, | Performed by: UROLOGY

## 2022-03-09 PROCEDURE — 99999 PR PBB SHADOW E&M-EST. PATIENT-LVL IV: ICD-10-PCS | Mod: PBBFAC,,, | Performed by: UROLOGY

## 2022-03-09 PROCEDURE — 4010F ACE/ARB THERAPY RXD/TAKEN: CPT | Mod: CPTII,,, | Performed by: UROLOGY

## 2022-03-09 PROCEDURE — 84153 ASSAY OF PSA TOTAL: CPT | Performed by: UROLOGY

## 2022-03-09 PROCEDURE — 3008F BODY MASS INDEX DOCD: CPT | Mod: CPTII,,, | Performed by: UROLOGY

## 2022-03-09 PROCEDURE — 3074F PR MOST RECENT SYSTOLIC BLOOD PRESSURE < 130 MM HG: ICD-10-PCS | Mod: CPTII,,, | Performed by: UROLOGY

## 2022-03-09 PROCEDURE — 99214 OFFICE O/P EST MOD 30 MIN: CPT | Mod: PBBFAC | Performed by: UROLOGY

## 2022-03-09 PROCEDURE — 99214 OFFICE O/P EST MOD 30 MIN: CPT | Mod: S$PBB,,, | Performed by: UROLOGY

## 2022-03-09 PROCEDURE — 1160F RVW MEDS BY RX/DR IN RCRD: CPT | Mod: CPTII,,, | Performed by: UROLOGY

## 2022-03-09 PROCEDURE — 3008F PR BODY MASS INDEX (BMI) DOCUMENTED: ICD-10-PCS | Mod: CPTII,,, | Performed by: UROLOGY

## 2022-03-09 PROCEDURE — 36415 COLL VENOUS BLD VENIPUNCTURE: CPT | Performed by: UROLOGY

## 2022-03-09 RX ORDER — ALFUZOSIN HYDROCHLORIDE 10 MG/1
10 TABLET, EXTENDED RELEASE ORAL
Qty: 30 TABLET | Refills: 11 | Status: SHIPPED | OUTPATIENT
Start: 2022-03-09 | End: 2023-03-09

## 2022-03-09 NOTE — PROGRESS NOTES
Chief Complaint: f/u ED    HPI:   2022 -  Patient returns today for follow-up, notes that he had some dizziness when taking the Flomax but that his stream was better on it, he has since stopped the medication, Cialis works well for him but he is not currently sexually active, notes some right-sided testicular discomfort intermittently, denies any gross hematuria or UTIs, PSA in Nov up to 5.7, needs repeat    2021 - patient presents today for follow-up, states that the Cialis has been working well for him, also notes urgency but denies any urge incontinence, stream has improved since starting the Flomax, denies side effects of either medication, did not schedule an appointment with primary care doctor and his BP is very elevated today    2021 - 62 y.o. male that presents for continued evaluation of ED.  This has been going on for year.  Patient has issues maintaining erections, has tried Viagra in the past but has never tried Cialis.  Also having issues emptying his bladder completely, would like to try medication.  Denies family history of  cancers, denies gross hematuria or UTIs.  ADDY - /2 = 6(severe ED)      PMH:  Past Medical History:   Diagnosis Date    Diabetes mellitus type II     Hyperlipidemia     Hypertension     Stroke     2011, denies deficits        PSH:  Past Surgical History:   Procedure Laterality Date    ANKLE FRACTURE SURGERY      HAND SURGERY Left     NERVE GRAFT         Family History:  Family History   Problem Relation Age of Onset    Heart disease Mother     Heart disease Father        Social History:  Social History     Tobacco Use    Smoking status: Former Smoker     Packs/day: 1.00     Years: 15.00     Pack years: 15.00     Quit date: 1990     Years since quittin.2    Smokeless tobacco: Never Used   Substance Use Topics    Alcohol use: No    Drug use: No        Review of Systems:  General: No fever, chills  Skin: No rashes  Chest:   "Denies cough and sputum production  Heart: Denies chest pain  Resp: Denies dyspnea  Abdomen: Denies diarrhea, abdominal pain, hematemesis, or blood in stool.  Musculoskeletal: No joint stiffness or swelling. Denies back pain.  : see HPI  Neuro: no dizziness or weakness    Allergies:  Pneumococcal 23-timothy ps vaccine    Medications:    Current Outpatient Medications:     aspirin (ECOTRIN) 81 MG EC tablet, Take 1 tablet (81 mg total) by mouth once daily., Disp: 60 tablet, Rfl: 1    blood sugar diagnostic Strp, Use as directed to check blood sugars twice daily, Disp: 100 each, Rfl: 1    blood-glucose meter Misc, use as directed (Patient taking differently: use as directed), Disp: 1 each, Rfl: 0    diclofenac (VOLTAREN) 75 MG EC tablet, Take 1 tablet (75 mg total) by mouth 2 (two) times daily as needed (pain shoulder)., Disp: 30 tablet, Rfl: 1    felodipine (PLENDIL) 10 MG 24 hr tablet, Take 1 tablet (10 mg total) by mouth once daily. for 10 days, Disp: 90 tablet, Rfl: 1    lancets Misc, use as directed to check blood sugar twice daily, Disp: 102 each, Rfl: 1    LIDOcaine (LIDODERM) 5 %, Place 1 patch onto the skin every 24 hours. Remove & Discard patch within 12 hours or as directed by MD, Disp: , Rfl:     metoprolol tartrate (LOPRESSOR) 100 MG tablet, Take 1 tablet (100 mg total) by mouth 2 (two) times daily., Disp: 180 tablet, Rfl: 1    pen needle, diabetic (BD ULTRA-FINE ANDREW PEN NEEDLE) 32 gauge x 5/32" Ndle, use as directed to inject insulin daily, Disp: 100 each, Rfl: 1    fluticasone propionate (FLONASE) 50 mcg/actuation nasal spray, as needed. , Disp: , Rfl:     HYDROcodone-acetaminophen (NORCO) 5-325 mg per tablet, Take 1 tablet by mouth every 4 (four) hours as needed for Pain. (Patient not taking: Reported on 3/9/2022), Disp: 12 tablet, Rfl: 0    lisinopriL (PRINIVIL,ZESTRIL) 40 MG tablet, Take 1 tablet (40 mg total) by mouth once daily., Disp: 30 tablet, Rfl: 3    rosuvastatin (CRESTOR) 20 MG " tablet, Take 1 tablet (20 mg total) by mouth once daily. (Patient not taking: Reported on 3/9/2022), Disp: 90 tablet, Rfl: 3    tadalafiL (CIALIS) 20 MG Tab, Take 1 tablet (20 mg total) by mouth once daily. (Patient not taking: Reported on 3/9/2022), Disp: 30 tablet, Rfl: 5    tamsulosin (FLOMAX) 0.4 mg Cap, Take 1 capsule (0.4 mg total) by mouth once daily. (Patient not taking: Reported on 3/9/2022), Disp: 30 capsule, Rfl: 11    Physical Exam:  Vitals:    03/09/22 1049   BP: 124/86     Body mass index is 34.12 kg/m².  General: awake, alert, cooperative  Head: NC/AT  Ears: external ears normal  Eyes: sclera normal  Lungs: normal inspiration, NAD  Heart: well-perfused  Abdomen: Soft, NT, ND  : Normal circ'd phallus, meatus normal in size and position, BL testicles palpable, no masses, nontender, no abnormalities of epididymi  KULDEEP: Normal rectal tone, no hemorrhoids. Prostate difficult to palpate due to body habitus but apex smooth and normal, no nodules, SV not palpable. Perineum and anus normal.  Lymphatic: groin nodes negative  Skin: The skin is warm and dry  Ext: No c/c/e.  Neuro: grossly intact, AOx3      RADIOLOGY:  No recent relevant imaging available for review.    LABS:  I personally reviewed the following lab values:  Lab Results   Component Value Date    WBC 5.25 11/15/2021    HGB 14.1 11/15/2021    HCT 44.1 11/15/2021     11/15/2021     11/15/2021    K 3.4 (L) 11/15/2021     11/15/2021    CREATININE 1.2 11/15/2021    BUN 18 11/15/2021    CO2 30 (H) 11/15/2021    TSH 0.848 11/15/2021    PSA 5.7 (H) 11/15/2021    INR 1.1 09/06/2019    HGBA1C 5.9 (H) 11/15/2021    CHOL 223 (H) 11/15/2021    TRIG 126 11/15/2021    HDL 37 (L) 11/15/2021    ALT 13 11/15/2021    AST 17 11/15/2021       Assessment/Plan:   Steven Shields is a 62 y.o. male with:    ED - continue Cialis PRN    BPH - switch to alfuzosin, SEs reviewed    Elevated PSA - repeat today, call with result    - f/u 6 months    Thank you  for allowing me the opportunity to participate in this patient's care.     Chris Ray MD  Urology

## 2022-03-10 ENCOUNTER — TELEPHONE (OUTPATIENT)
Dept: UROLOGY | Facility: CLINIC | Age: 62
End: 2022-03-10
Payer: MEDICAID

## 2022-03-10 NOTE — TELEPHONE ENCOUNTER
Successfully contacted patient and informed him that his PSA remains elevated and that he needed to come in to have a discussion with Dr. Ray. Patient agreed to come in on 3/22 at 4:15PM; the [place] location to follow up with Dr. Ray.      ----- Message from Chris Ray MD sent at 3/10/2022  6:45 AM CST -----  Please let patient know his PSA remains elevated, needs to f/u with me for discussion, thanks

## 2022-03-14 ENCOUNTER — TELEPHONE (OUTPATIENT)
Dept: PRIMARY CARE CLINIC | Facility: CLINIC | Age: 62
End: 2022-03-14
Payer: MEDICAID

## 2022-03-14 NOTE — TELEPHONE ENCOUNTER
Called patient in regards to message, patient states he received letter with contact number [phone] to call and scheduled order. Informed patient that nurse confirm that there are no orders from the clinic, or letters sent out to him. Advised to call number for more information. He voiced understanding.        ----- Message from Nora Arnold sent at 3/14/2022  1:14 PM CDT -----  Contact: Patient  Patient called to consult with nurse or staff regarding a missed call. He would like a call back and can be reached at [phone]. Thanks/MR

## 2022-03-18 ENCOUNTER — TELEPHONE (OUTPATIENT)
Dept: UROLOGY | Facility: CLINIC | Age: 62
End: 2022-03-18
Payer: MEDICAID

## 2022-03-18 NOTE — TELEPHONE ENCOUNTER
Tried calling pt twice there was no answer. Left VM letting patient know his appt is moved up to 12;15 instead of 4:15. Explained that Dr Ray will be leaving early that day and he will need to come in early to be seen on 3/22/22.

## 2022-03-22 ENCOUNTER — OFFICE VISIT (OUTPATIENT)
Dept: UROLOGY | Facility: CLINIC | Age: 62
End: 2022-03-22
Payer: MEDICAID

## 2022-03-22 VITALS
HEIGHT: 69 IN | BODY MASS INDEX: 34.58 KG/M2 | DIASTOLIC BLOOD PRESSURE: 87 MMHG | TEMPERATURE: 98 F | SYSTOLIC BLOOD PRESSURE: 147 MMHG | WEIGHT: 233.44 LBS | HEART RATE: 67 BPM

## 2022-03-22 DIAGNOSIS — R97.20 ELEVATED PSA: Primary | ICD-10-CM

## 2022-03-22 PROCEDURE — 3077F PR MOST RECENT SYSTOLIC BLOOD PRESSURE >= 140 MM HG: ICD-10-PCS | Mod: CPTII,,, | Performed by: UROLOGY

## 2022-03-22 PROCEDURE — 4010F PR ACE/ARB THEARPY RXD/TAKEN: ICD-10-PCS | Mod: CPTII,,, | Performed by: UROLOGY

## 2022-03-22 PROCEDURE — 3008F BODY MASS INDEX DOCD: CPT | Mod: CPTII,,, | Performed by: UROLOGY

## 2022-03-22 PROCEDURE — 99214 PR OFFICE/OUTPT VISIT, EST, LEVL IV, 30-39 MIN: ICD-10-PCS | Mod: S$PBB,,, | Performed by: UROLOGY

## 2022-03-22 PROCEDURE — 99214 OFFICE O/P EST MOD 30 MIN: CPT | Mod: S$PBB,,, | Performed by: UROLOGY

## 2022-03-22 PROCEDURE — 99999 PR PBB SHADOW E&M-EST. PATIENT-LVL IV: ICD-10-PCS | Mod: PBBFAC,,, | Performed by: UROLOGY

## 2022-03-22 PROCEDURE — 99999 PR PBB SHADOW E&M-EST. PATIENT-LVL IV: CPT | Mod: PBBFAC,,, | Performed by: UROLOGY

## 2022-03-22 PROCEDURE — 1160F PR REVIEW ALL MEDS BY PRESCRIBER/CLIN PHARMACIST DOCUMENTED: ICD-10-PCS | Mod: CPTII,,, | Performed by: UROLOGY

## 2022-03-22 PROCEDURE — 3077F SYST BP >= 140 MM HG: CPT | Mod: CPTII,,, | Performed by: UROLOGY

## 2022-03-22 PROCEDURE — 1159F MED LIST DOCD IN RCRD: CPT | Mod: CPTII,,, | Performed by: UROLOGY

## 2022-03-22 PROCEDURE — 4010F ACE/ARB THERAPY RXD/TAKEN: CPT | Mod: CPTII,,, | Performed by: UROLOGY

## 2022-03-22 PROCEDURE — 3079F PR MOST RECENT DIASTOLIC BLOOD PRESSURE 80-89 MM HG: ICD-10-PCS | Mod: CPTII,,, | Performed by: UROLOGY

## 2022-03-22 PROCEDURE — 99214 OFFICE O/P EST MOD 30 MIN: CPT | Mod: PBBFAC | Performed by: UROLOGY

## 2022-03-22 PROCEDURE — 1159F PR MEDICATION LIST DOCUMENTED IN MEDICAL RECORD: ICD-10-PCS | Mod: CPTII,,, | Performed by: UROLOGY

## 2022-03-22 PROCEDURE — 3079F DIAST BP 80-89 MM HG: CPT | Mod: CPTII,,, | Performed by: UROLOGY

## 2022-03-22 PROCEDURE — 1160F RVW MEDS BY RX/DR IN RCRD: CPT | Mod: CPTII,,, | Performed by: UROLOGY

## 2022-03-22 PROCEDURE — 3008F PR BODY MASS INDEX (BMI) DOCUMENTED: ICD-10-PCS | Mod: CPTII,,, | Performed by: UROLOGY

## 2022-03-23 NOTE — PROGRESS NOTES
Chief Complaint: elevated PSA    HPI:   2022 - returns today for discussion of elevated PSA, PSA was confirmed elevated at 5.9, not having any symptoms, voiding well    2022 -  Patient returns today for follow-up, notes that he had some dizziness when taking the Flomax but that his stream was better on it, he has since stopped the medication, Cialis works well for him but he is not currently sexually active, notes some right-sided testicular discomfort intermittently, denies any gross hematuria or UTIs, PSA in Nov up to 5.7, needs repeat    2021 - patient presents today for follow-up, states that the Cialis has been working well for him, also notes urgency but denies any urge incontinence, stream has improved since starting the Flomax, denies side effects of either medication, did not schedule an appointment with primary care doctor and his BP is very elevated today    2021 - 62 y.o. male that presents for continued evaluation of ED.  This has been going on for year.  Patient has issues maintaining erections, has tried Viagra in the past but has never tried Cialis.  Also having issues emptying his bladder completely, would like to try medication.  Denies family history of  cancers, denies gross hematuria or UTIs.  ADDY - /2 = 6(severe ED)      PMH:  Past Medical History:   Diagnosis Date    Diabetes mellitus type II     Hyperlipidemia     Hypertension     Stroke     2011, denies deficits        PSH:  Past Surgical History:   Procedure Laterality Date    ANKLE FRACTURE SURGERY      HAND SURGERY Left     NERVE GRAFT         Family History:  Family History   Problem Relation Age of Onset    Heart disease Mother     Heart disease Father        Social History:  Social History     Tobacco Use    Smoking status: Former Smoker     Packs/day: 1.00     Years: 15.00     Pack years: 15.00     Quit date: 1990     Years since quittin.2    Smokeless tobacco: Never Used  "  Substance Use Topics    Alcohol use: No    Drug use: No        Review of Systems:  General: No fever, chills  Skin: No rashes  Chest:  Denies cough and sputum production  Heart: Denies chest pain  Resp: Denies dyspnea  Abdomen: Denies diarrhea, abdominal pain, hematemesis, or blood in stool.  Musculoskeletal: No joint stiffness or swelling. Denies back pain.  : see HPI  Neuro: no dizziness or weakness    Allergies:  Pneumococcal 23-timothy ps vaccine    Medications:    Current Outpatient Medications:     alfuzosin (UROXATRAL) 10 mg Tb24, Take 1 tablet (10 mg total) by mouth daily with breakfast., Disp: 30 tablet, Rfl: 11    aspirin (ECOTRIN) 81 MG EC tablet, Take 1 tablet (81 mg total) by mouth once daily., Disp: 60 tablet, Rfl: 1    blood sugar diagnostic Strp, Use as directed to check blood sugars twice daily, Disp: 100 each, Rfl: 1    blood-glucose meter Misc, use as directed (Patient taking differently: use as directed), Disp: 1 each, Rfl: 0    diclofenac (VOLTAREN) 75 MG EC tablet, Take 1 tablet (75 mg total) by mouth 2 (two) times daily as needed (pain shoulder)., Disp: 30 tablet, Rfl: 1    fluticasone propionate (FLONASE) 50 mcg/actuation nasal spray, as needed. , Disp: , Rfl:     HYDROcodone-acetaminophen (NORCO) 5-325 mg per tablet, Take 1 tablet by mouth every 4 (four) hours as needed for Pain., Disp: 12 tablet, Rfl: 0    lancets Misc, use as directed to check blood sugar twice daily, Disp: 102 each, Rfl: 1    LIDOcaine (LIDODERM) 5 %, Place 1 patch onto the skin every 24 hours. Remove & Discard patch within 12 hours or as directed by MD, Disp: , Rfl:     metoprolol tartrate (LOPRESSOR) 100 MG tablet, Take 1 tablet (100 mg total) by mouth 2 (two) times daily., Disp: 180 tablet, Rfl: 1    pen needle, diabetic (BD ULTRA-FINE ANDREW PEN NEEDLE) 32 gauge x 5/32" Ndle, use as directed to inject insulin daily, Disp: 100 each, Rfl: 1    rosuvastatin (CRESTOR) 20 MG tablet, Take 1 tablet (20 mg total) " by mouth once daily., Disp: 90 tablet, Rfl: 3    tadalafiL (CIALIS) 20 MG Tab, Take 1 tablet (20 mg total) by mouth once daily., Disp: 30 tablet, Rfl: 5    felodipine (PLENDIL) 10 MG 24 hr tablet, Take 1 tablet (10 mg total) by mouth once daily. for 10 days, Disp: 90 tablet, Rfl: 1    lisinopriL (PRINIVIL,ZESTRIL) 40 MG tablet, Take 1 tablet (40 mg total) by mouth once daily., Disp: 30 tablet, Rfl: 3    Physical Exam:  Vitals:    03/22/22 1143   BP: (!) 147/87   Pulse: 67   Temp: 97.6 °F (36.4 °C)     Body mass index is 34.48 kg/m².  General: awake, alert, cooperative  Head: NC/AT  Ears: external ears normal  Eyes: sclera normal  Lungs: normal inspiration, NAD  Heart: well-perfused  Abdomen: Soft, NT, ND   3/22: Normal circ'd phallus, meatus normal in size and position, BL testicles palpable, no masses, nontender, no abnormalities of epididymi  KULDEEP 3/22:  Normal rectal tone, no hemorrhoids. Prostate difficult to palpate due to body habitus but apex smooth and normal, no nodules, SV not palpable. Perineum and anus normal.  Lymphatic: groin nodes negative  Skin: The skin is warm and dry  Ext: No c/c/e.  Neuro: grossly intact, AOx3      RADIOLOGY:  No recent relevant imaging available for review.    LABS:  I personally reviewed the following lab values:  Lab Results   Component Value Date    WBC 5.25 11/15/2021    HGB 14.1 11/15/2021    HCT 44.1 11/15/2021     11/15/2021     11/15/2021    K 3.4 (L) 11/15/2021     11/15/2021    CREATININE 1.2 11/15/2021    BUN 18 11/15/2021    CO2 30 (H) 11/15/2021    TSH 0.848 11/15/2021    PSA 5.7 (H) 11/15/2021    INR 1.1 09/06/2019    HGBA1C 5.9 (H) 11/15/2021    CHOL 223 (H) 11/15/2021    TRIG 126 11/15/2021    HDL 37 (L) 11/15/2021    ALT 13 11/15/2021    AST 17 11/15/2021       Assessment/Plan:   Steven Shields is a 62 y.o. male with:    Elevated PSA - move forward with MRI and bx    ED - continue Cialis PRN    BPH - switch to alfuzosin, SEs  reviewed    Thank you for allowing me the opportunity to participate in this patient's care.     Chris Ray MD  Urology

## 2022-04-19 DIAGNOSIS — Z12.11 COLON CANCER SCREENING: Primary | ICD-10-CM

## 2022-04-26 DIAGNOSIS — I10 ESSENTIAL HYPERTENSION: ICD-10-CM

## 2022-04-26 RX ORDER — FELODIPINE 10 MG/1
10 TABLET, EXTENDED RELEASE ORAL DAILY
Qty: 30 TABLET | Refills: 0 | Status: SHIPPED | OUTPATIENT
Start: 2022-04-26 | End: 2022-05-23

## 2022-04-27 ENCOUNTER — TELEPHONE (OUTPATIENT)
Dept: UROLOGY | Facility: CLINIC | Age: 62
End: 2022-04-27
Payer: MEDICAID

## 2022-04-27 DIAGNOSIS — R97.20 ELEVATED PSA: Primary | ICD-10-CM

## 2022-04-27 NOTE — TELEPHONE ENCOUNTER
Attempted to contact pt regarding lab needed before MRI. No answer. LVM    ----- Message from RT Jose Luis sent at 4/27/2022  8:31 AM CDT -----  Regarding: Creatinine    Hello,  Pt: [id number] needs creatinine value for the up coming MRI scan on 5/4/22, please order and schedule lab work for this pt.  Thank you.

## 2022-05-18 ENCOUNTER — TELEPHONE (OUTPATIENT)
Dept: PRIMARY CARE CLINIC | Facility: CLINIC | Age: 62
End: 2022-05-18
Payer: MEDICAID

## 2022-08-16 ENCOUNTER — TELEPHONE (OUTPATIENT)
Dept: UROLOGY | Facility: CLINIC | Age: 62
End: 2022-08-16
Payer: MEDICAID

## 2022-08-16 NOTE — TELEPHONE ENCOUNTER
----- Message from Lillie Castro RN sent at 8/15/2022  5:09 PM CDT -----  Contact: @[phone]    ----- Message -----  From: Fannie Sanders  Sent: 8/15/2022  11:05 AM CDT  To: [facility] Clinical Staff    Pt is calling in to schedule his follow up appt, pt states that he is in [place] and would like to be seen at [facility] and not [facility]. Pt states that he needs to be seen 8/24- 8/26, because he will be leaving to go out of town for a couple of months. Please call to discuss further.

## 2022-08-22 ENCOUNTER — TELEPHONE (OUTPATIENT)
Dept: UROLOGY | Facility: CLINIC | Age: 62
End: 2022-08-22
Payer: MEDICAID

## 2022-08-22 NOTE — TELEPHONE ENCOUNTER
No answer. Voicemail box not set up. Unable to leave message. Will attempt to call again at a later time.     Appt will need to be rescheduled due to Stefanie Salguero NP being out of clinic.

## 2022-08-23 ENCOUNTER — TELEPHONE (OUTPATIENT)
Dept: UROLOGY | Facility: CLINIC | Age: 62
End: 2022-08-23
Payer: MEDICAID

## 2022-08-24 ENCOUNTER — TELEPHONE (OUTPATIENT)
Dept: UROLOGY | Facility: CLINIC | Age: 62
End: 2022-08-24
Payer: MEDICAID

## 2022-09-25 NOTE — TELEPHONE ENCOUNTER
Attempted to contact patient regarding the message below. LVM with callback number for patient to return call.   ----- Message from Salma Dawkins sent at 2/24/2022 11:55 AM CST -----  Contact: Steven Alvarez is requesting a call to reschedule his appointment that was missed. Please call him back at [phone].          Thanks  DD       02-Mar-2018 23:29 Low 4

## 2022-10-26 ENCOUNTER — PATIENT OUTREACH (OUTPATIENT)
Dept: ADMINISTRATIVE | Facility: HOSPITAL | Age: 62
End: 2022-10-26
Payer: MEDICAID

## 2023-02-01 ENCOUNTER — TELEPHONE (OUTPATIENT)
Dept: PRIMARY CARE CLINIC | Facility: CLINIC | Age: 63
End: 2023-02-01
Payer: MEDICAID

## 2023-02-01 NOTE — TELEPHONE ENCOUNTER
Spoke with patient in regards to BP. Patient confirm that he no longer lives in [facility] and Dr. Rogers is no longer his PCP. Provider notified.

## 2023-02-09 ENCOUNTER — TELEPHONE (OUTPATIENT)
Dept: PRIMARY CARE CLINIC | Facility: CLINIC | Age: 63
End: 2023-02-09
Payer: MEDICAID